# Patient Record
Sex: FEMALE | Race: BLACK OR AFRICAN AMERICAN | NOT HISPANIC OR LATINO
[De-identification: names, ages, dates, MRNs, and addresses within clinical notes are randomized per-mention and may not be internally consistent; named-entity substitution may affect disease eponyms.]

---

## 2018-11-08 PROBLEM — Z00.00 ENCOUNTER FOR PREVENTIVE HEALTH EXAMINATION: Status: ACTIVE | Noted: 2018-11-08

## 2018-11-13 ENCOUNTER — APPOINTMENT (OUTPATIENT)
Dept: VASCULAR SURGERY | Facility: CLINIC | Age: 68
End: 2018-11-13
Payer: COMMERCIAL

## 2018-11-13 ENCOUNTER — INPATIENT (INPATIENT)
Facility: HOSPITAL | Age: 68
LOS: 30 days | Discharge: NURSING, REHAB OR LONG-TERM CARE | End: 2018-12-14
Attending: INTERNAL MEDICINE | Admitting: INTERNAL MEDICINE
Payer: MEDICARE

## 2018-11-13 VITALS
DIASTOLIC BLOOD PRESSURE: 73 MMHG | HEART RATE: 127 BPM | SYSTOLIC BLOOD PRESSURE: 137 MMHG | RESPIRATION RATE: 16 BRPM | TEMPERATURE: 98 F | OXYGEN SATURATION: 100 %

## 2018-11-13 VITALS
WEIGHT: 170 LBS | BODY MASS INDEX: 26.68 KG/M2 | HEIGHT: 67 IN | HEART RATE: 114 BPM | SYSTOLIC BLOOD PRESSURE: 132 MMHG | DIASTOLIC BLOOD PRESSURE: 83 MMHG

## 2018-11-13 LAB
ALBUMIN SERPL ELPH-MCNC: 3.7 G/DL — SIGNIFICANT CHANGE UP (ref 3.3–5)
ALP SERPL-CCNC: 85 U/L — SIGNIFICANT CHANGE UP (ref 40–120)
ALT FLD-CCNC: 12 U/L — SIGNIFICANT CHANGE UP (ref 4–33)
APTT BLD: 32 SEC — SIGNIFICANT CHANGE UP (ref 27.5–36.3)
AST SERPL-CCNC: 13 U/L — SIGNIFICANT CHANGE UP (ref 4–32)
BASOPHILS # BLD AUTO: 0.09 K/UL — SIGNIFICANT CHANGE UP (ref 0–0.2)
BASOPHILS NFR BLD AUTO: 0.4 % — SIGNIFICANT CHANGE UP (ref 0–2)
BILIRUB SERPL-MCNC: 0.2 MG/DL — SIGNIFICANT CHANGE UP (ref 0.2–1.2)
BUN SERPL-MCNC: 23 MG/DL — SIGNIFICANT CHANGE UP (ref 7–23)
CALCIUM SERPL-MCNC: 10.9 MG/DL — HIGH (ref 8.4–10.5)
CHLORIDE SERPL-SCNC: 100 MMOL/L — SIGNIFICANT CHANGE UP (ref 98–107)
CO2 SERPL-SCNC: 28 MMOL/L — SIGNIFICANT CHANGE UP (ref 22–31)
CREAT SERPL-MCNC: 0.83 MG/DL — SIGNIFICANT CHANGE UP (ref 0.5–1.3)
EOSINOPHIL # BLD AUTO: 0.04 K/UL — SIGNIFICANT CHANGE UP (ref 0–0.5)
EOSINOPHIL NFR BLD AUTO: 0.2 % — SIGNIFICANT CHANGE UP (ref 0–6)
GLUCOSE SERPL-MCNC: 150 MG/DL — HIGH (ref 70–99)
HCT VFR BLD CALC: 33.9 % — LOW (ref 34.5–45)
HGB BLD-MCNC: 10.6 G/DL — LOW (ref 11.5–15.5)
IMM GRANULOCYTES # BLD AUTO: 0.15 # — SIGNIFICANT CHANGE UP
IMM GRANULOCYTES NFR BLD AUTO: 0.7 % — SIGNIFICANT CHANGE UP (ref 0–1.5)
INR BLD: 1.13 — SIGNIFICANT CHANGE UP (ref 0.88–1.17)
LYMPHOCYTES # BLD AUTO: 10.5 % — LOW (ref 13–44)
LYMPHOCYTES # BLD AUTO: 2.24 K/UL — SIGNIFICANT CHANGE UP (ref 1–3.3)
MCHC RBC-ENTMCNC: 24.2 PG — LOW (ref 27–34)
MCHC RBC-ENTMCNC: 31.3 % — LOW (ref 32–36)
MCV RBC AUTO: 77.4 FL — LOW (ref 80–100)
MONOCYTES # BLD AUTO: 1.96 K/UL — HIGH (ref 0–0.9)
MONOCYTES NFR BLD AUTO: 9.2 % — SIGNIFICANT CHANGE UP (ref 2–14)
NEUTROPHILS # BLD AUTO: 16.94 K/UL — HIGH (ref 1.8–7.4)
NEUTROPHILS NFR BLD AUTO: 79 % — HIGH (ref 43–77)
NRBC # FLD: 0 — SIGNIFICANT CHANGE UP
PLATELET # BLD AUTO: 454 K/UL — HIGH (ref 150–400)
PMV BLD: 10 FL — SIGNIFICANT CHANGE UP (ref 7–13)
POTASSIUM SERPL-MCNC: 3.8 MMOL/L — SIGNIFICANT CHANGE UP (ref 3.5–5.3)
POTASSIUM SERPL-SCNC: 3.8 MMOL/L — SIGNIFICANT CHANGE UP (ref 3.5–5.3)
PROT SERPL-MCNC: 7.9 G/DL — SIGNIFICANT CHANGE UP (ref 6–8.3)
PROTHROM AB SERPL-ACNC: 12.9 SEC — SIGNIFICANT CHANGE UP (ref 9.8–13.1)
RBC # BLD: 4.38 M/UL — SIGNIFICANT CHANGE UP (ref 3.8–5.2)
RBC # FLD: 17.6 % — HIGH (ref 10.3–14.5)
SODIUM SERPL-SCNC: 140 MMOL/L — SIGNIFICANT CHANGE UP (ref 135–145)
WBC # BLD: 21.42 K/UL — HIGH (ref 3.8–10.5)
WBC # FLD AUTO: 21.42 K/UL — HIGH (ref 3.8–10.5)

## 2018-11-13 PROCEDURE — 93923 UPR/LXTR ART STDY 3+ LVLS: CPT

## 2018-11-13 PROCEDURE — 99203 OFFICE O/P NEW LOW 30 MIN: CPT

## 2018-11-13 PROCEDURE — 93925 LOWER EXTREMITY STUDY: CPT

## 2018-11-13 RX ORDER — CEFAZOLIN SODIUM 1 G
1000 VIAL (EA) INJECTION ONCE
Qty: 0 | Refills: 0 | Status: COMPLETED | OUTPATIENT
Start: 2018-11-13 | End: 2018-11-13

## 2018-11-13 RX ORDER — SODIUM CHLORIDE 9 MG/ML
1000 INJECTION INTRAMUSCULAR; INTRAVENOUS; SUBCUTANEOUS ONCE
Qty: 0 | Refills: 0 | Status: COMPLETED | OUTPATIENT
Start: 2018-11-13 | End: 2018-11-13

## 2018-11-13 RX ORDER — ACETAMINOPHEN 500 MG
650 TABLET ORAL ONCE
Qty: 0 | Refills: 0 | Status: COMPLETED | OUTPATIENT
Start: 2018-11-13 | End: 2018-11-13

## 2018-11-13 RX ADMIN — Medication 650 MILLIGRAM(S): at 22:43

## 2018-11-13 RX ADMIN — Medication 100 MILLIGRAM(S): at 22:19

## 2018-11-13 RX ADMIN — SODIUM CHLORIDE 1000 MILLILITER(S): 9 INJECTION INTRAMUSCULAR; INTRAVENOUS; SUBCUTANEOUS at 22:43

## 2018-11-13 NOTE — ED PROVIDER NOTE - MEDICAL DECISION MAKING DETAILS
68F DM HTN sent in by Dr Hamilton for eval of left foot with swelling, erythema. Concern for cellulitis vs worsening arterial occlusion. Surgery consulted. Labs. Xrays. Rectal temp. Abx.

## 2018-11-13 NOTE — ED PROVIDER NOTE - OBJECTIVE STATEMENT
67yo female pmh HTN, DM, breast CA s/p chemo, lumpectomy p/w left leg pain sent in by Dr Hamilton. LLE pain x 1 month, worsening. Bx nodule right calf by dermatologist, told it was "vascular" and was sent to vascular surgeon. 67yo female pmh HTN, DM, breast CA s/p chemo, lumpectomy p/w left leg pain sent in by Dr Hamilton. LLE pain x 1 month, worsening. Bx nodule right calf by dermatologist, told it was "vascular" and was sent to vascular surgeon. Pt c/o pain in left foot worse with palpation, no improvement with Tylenol. Denies fevers, vomiting, diarrhea, abdominal pain, recent travel, urinary symptoms, chest pain, dyspnea.

## 2018-11-13 NOTE — ED ADULT NURSE NOTE - OBJECTIVE STATEMENT
Pt received A&Ox4 to ED Rm 1 with c/o LLE swelling x 1 mo. Seen by PMD & told to report to ED for further work up. LLE noted to be cold with +2 pitting edema & slight redness, no pedal pulse felt; MD at bedside with doppler. Affected extremity//painful to touch. States, "When I walk sometimes my leg feels sluggish." Pt denies SOB, CP, palp, or any other adverse symptom at this time. States hx of breast ca with R sided lumpectomy; precautions noted to RUE. RR equal & unlabored. Awaiting MD orders. Report to primary RN.

## 2018-11-13 NOTE — ED PROVIDER NOTE - ATTENDING CONTRIBUTION TO CARE
DR. BLOCH, ATTENDING MD-  I performed a face to face bedside interview with patient regarding history of present illness, review of symptoms and past medical history. I completed an independent physical exam.  I have discussed patient's plan of care with the resident.   Patient well appearing, febrile, tachy to 120, HEENT nml lungs clear, heart sounds nml abd soft extrem mild edema no palp pulses bilaterally but warm extrem 3 sec cap refill and dopplerable pt but not DP bilaterally. sensation intact. tender dorsum left foot with red streaks upmedial lower leg

## 2018-11-13 NOTE — ED PROVIDER NOTE - PHYSICAL EXAMINATION
Pulses: Unable to palpate. Doppler PT pulses bilateral ankles, left diminished compared to right. Unable to doppler bilateral DP pulses

## 2018-11-13 NOTE — ED PROVIDER NOTE - PROGRESS NOTE DETAILS
Rectal temp elevated. Will give tylenol, IVF. Already received cefazolin. Surgery aware. Zvi Dubin, MD ([id number]) note: accepted by Dr Lema.  Per request, added BCx, CTA aorta w/run off, broad ABX.

## 2018-11-13 NOTE — ED ADULT TRIAGE NOTE - CHIEF COMPLAINT QUOTE
Pt sent in by PMD to r/o Lt lower leg ischemia, pt states she has poor circulation to Lt leg c/o redness, warmth, and swelling to Lt leg, denies sob, breathing even and unlabored, denies cp/discomfort. Pt sent in for admission.

## 2018-11-14 DIAGNOSIS — L03.116 CELLULITIS OF LEFT LOWER LIMB: ICD-10-CM

## 2018-11-14 DIAGNOSIS — Z29.9 ENCOUNTER FOR PROPHYLACTIC MEASURES, UNSPECIFIED: ICD-10-CM

## 2018-11-14 DIAGNOSIS — D50.9 IRON DEFICIENCY ANEMIA, UNSPECIFIED: ICD-10-CM

## 2018-11-14 DIAGNOSIS — E11.9 TYPE 2 DIABETES MELLITUS WITHOUT COMPLICATIONS: ICD-10-CM

## 2018-11-14 DIAGNOSIS — I10 ESSENTIAL (PRIMARY) HYPERTENSION: ICD-10-CM

## 2018-11-14 LAB
APPEARANCE UR: SIGNIFICANT CHANGE UP
BACTERIA # UR AUTO: NEGATIVE — SIGNIFICANT CHANGE UP
BASE EXCESS BLDV CALC-SCNC: 4.1 MMOL/L — SIGNIFICANT CHANGE UP
BILIRUB UR-MCNC: NEGATIVE — SIGNIFICANT CHANGE UP
BLOOD GAS VENOUS - CREATININE: 0.64 MG/DL — SIGNIFICANT CHANGE UP (ref 0.5–1.3)
BLOOD UR QL VISUAL: SIGNIFICANT CHANGE UP
CHLORIDE BLDV-SCNC: 106 MMOL/L — SIGNIFICANT CHANGE UP (ref 96–108)
COLOR SPEC: YELLOW — SIGNIFICANT CHANGE UP
GAS PNL BLDV: 131 MMOL/L — LOW (ref 136–146)
GLUCOSE BLDV-MCNC: 146 — HIGH (ref 70–99)
GLUCOSE UR-MCNC: NEGATIVE — SIGNIFICANT CHANGE UP
HBA1C BLD-MCNC: 6.3 % — HIGH (ref 4–5.6)
HCO3 BLDV-SCNC: 27 MMOL/L — SIGNIFICANT CHANGE UP (ref 20–27)
HCT VFR BLDV CALC: 31.2 % — LOW (ref 34.5–45)
HGB BLDV-MCNC: 10.1 G/DL — LOW (ref 11.5–15.5)
HYALINE CASTS # UR AUTO: HIGH
KETONES UR-MCNC: SIGNIFICANT CHANGE UP
LACTATE BLDV-MCNC: 1.5 MMOL/L — SIGNIFICANT CHANGE UP (ref 0.5–2)
LEUKOCYTE ESTERASE UR-ACNC: SIGNIFICANT CHANGE UP
NITRITE UR-MCNC: NEGATIVE — SIGNIFICANT CHANGE UP
PCO2 BLDV: 50 MMHG — SIGNIFICANT CHANGE UP (ref 41–51)
PH BLDV: 7.38 PH — SIGNIFICANT CHANGE UP (ref 7.32–7.43)
PH UR: 5.5 — SIGNIFICANT CHANGE UP (ref 5–8)
PO2 BLDV: 29 MMHG — LOW (ref 35–40)
POTASSIUM BLDV-SCNC: 3.6 MMOL/L — SIGNIFICANT CHANGE UP (ref 3.4–4.5)
PROT UR-MCNC: 100 — HIGH
RBC CASTS # UR COMP ASSIST: SIGNIFICANT CHANGE UP (ref 0–?)
SAO2 % BLDV: 41.7 % — LOW (ref 60–85)
SP GR SPEC: 1.03 — SIGNIFICANT CHANGE UP (ref 1–1.04)
SQUAMOUS # UR AUTO: SIGNIFICANT CHANGE UP
UROBILINOGEN FLD QL: SIGNIFICANT CHANGE UP
WBC UR QL: HIGH (ref 0–?)

## 2018-11-14 PROCEDURE — 73600 X-RAY EXAM OF ANKLE: CPT | Mod: 26,LT

## 2018-11-14 PROCEDURE — 99232 SBSQ HOSP IP/OBS MODERATE 35: CPT | Mod: GC

## 2018-11-14 PROCEDURE — 99222 1ST HOSP IP/OBS MODERATE 55: CPT

## 2018-11-14 PROCEDURE — 73620 X-RAY EXAM OF FOOT: CPT | Mod: 26,LT

## 2018-11-14 PROCEDURE — 99233 SBSQ HOSP IP/OBS HIGH 50: CPT | Mod: GC

## 2018-11-14 PROCEDURE — 71046 X-RAY EXAM CHEST 2 VIEWS: CPT | Mod: 26

## 2018-11-14 PROCEDURE — 75635 CT ANGIO ABDOMINAL ARTERIES: CPT | Mod: 26

## 2018-11-14 PROCEDURE — 73590 X-RAY EXAM OF LOWER LEG: CPT | Mod: 26,LT

## 2018-11-14 RX ORDER — DEXTROSE 50 % IN WATER 50 %
15 SYRINGE (ML) INTRAVENOUS ONCE
Qty: 0 | Refills: 0 | Status: DISCONTINUED | OUTPATIENT
Start: 2018-11-14 | End: 2018-12-14

## 2018-11-14 RX ORDER — PIPERACILLIN AND TAZOBACTAM 4; .5 G/20ML; G/20ML
3.38 INJECTION, POWDER, LYOPHILIZED, FOR SOLUTION INTRAVENOUS EVERY 12 HOURS
Qty: 0 | Refills: 0 | Status: DISCONTINUED | OUTPATIENT
Start: 2018-11-14 | End: 2018-11-14

## 2018-11-14 RX ORDER — VANCOMYCIN HCL 1 G
1000 VIAL (EA) INTRAVENOUS EVERY 12 HOURS
Qty: 0 | Refills: 0 | Status: DISCONTINUED | OUTPATIENT
Start: 2018-11-14 | End: 2018-11-16

## 2018-11-14 RX ORDER — ENOXAPARIN SODIUM 100 MG/ML
40 INJECTION SUBCUTANEOUS DAILY
Qty: 0 | Refills: 0 | Status: DISCONTINUED | OUTPATIENT
Start: 2018-11-14 | End: 2018-11-14

## 2018-11-14 RX ORDER — ACETAMINOPHEN 500 MG
975 TABLET ORAL EVERY 6 HOURS
Qty: 0 | Refills: 0 | Status: DISCONTINUED | OUTPATIENT
Start: 2018-11-14 | End: 2018-12-14

## 2018-11-14 RX ORDER — PIPERACILLIN AND TAZOBACTAM 4; .5 G/20ML; G/20ML
3.38 INJECTION, POWDER, LYOPHILIZED, FOR SOLUTION INTRAVENOUS EVERY 8 HOURS
Qty: 0 | Refills: 0 | Status: DISCONTINUED | OUTPATIENT
Start: 2018-11-14 | End: 2018-11-14

## 2018-11-14 RX ORDER — PIPERACILLIN AND TAZOBACTAM 4; .5 G/20ML; G/20ML
3.38 INJECTION, POWDER, LYOPHILIZED, FOR SOLUTION INTRAVENOUS ONCE
Qty: 0 | Refills: 0 | Status: COMPLETED | OUTPATIENT
Start: 2018-11-14 | End: 2018-11-14

## 2018-11-14 RX ORDER — OXYCODONE HYDROCHLORIDE 5 MG/1
5 TABLET ORAL EVERY 6 HOURS
Qty: 0 | Refills: 0 | Status: DISCONTINUED | OUTPATIENT
Start: 2018-11-14 | End: 2018-11-19

## 2018-11-14 RX ORDER — VANCOMYCIN HCL 1 G
1000 VIAL (EA) INTRAVENOUS ONCE
Qty: 0 | Refills: 0 | Status: COMPLETED | OUTPATIENT
Start: 2018-11-14 | End: 2018-11-14

## 2018-11-14 RX ORDER — SODIUM CHLORIDE 9 MG/ML
1000 INJECTION, SOLUTION INTRAVENOUS
Qty: 0 | Refills: 0 | Status: DISCONTINUED | OUTPATIENT
Start: 2018-11-14 | End: 2018-12-14

## 2018-11-14 RX ORDER — LISINOPRIL 2.5 MG/1
10 TABLET ORAL DAILY
Qty: 0 | Refills: 0 | Status: DISCONTINUED | OUTPATIENT
Start: 2018-11-14 | End: 2018-11-14

## 2018-11-14 RX ORDER — DEXTROSE 50 % IN WATER 50 %
12.5 SYRINGE (ML) INTRAVENOUS ONCE
Qty: 0 | Refills: 0 | Status: DISCONTINUED | OUTPATIENT
Start: 2018-11-14 | End: 2018-12-14

## 2018-11-14 RX ORDER — SODIUM CHLORIDE 9 MG/ML
1000 INJECTION, SOLUTION INTRAVENOUS
Qty: 0 | Refills: 0 | Status: DISCONTINUED | OUTPATIENT
Start: 2018-11-14 | End: 2018-11-25

## 2018-11-14 RX ORDER — POTASSIUM CHLORIDE 20 MEQ
10 PACKET (EA) ORAL
Qty: 0 | Refills: 0 | Status: COMPLETED | OUTPATIENT
Start: 2018-11-14 | End: 2018-11-14

## 2018-11-14 RX ORDER — HEPARIN SODIUM 5000 [USP'U]/ML
900 INJECTION INTRAVENOUS; SUBCUTANEOUS
Qty: 25000 | Refills: 0 | Status: DISCONTINUED | OUTPATIENT
Start: 2018-11-14 | End: 2018-11-14

## 2018-11-14 RX ORDER — GLUCAGON INJECTION, SOLUTION 0.5 MG/.1ML
1 INJECTION, SOLUTION SUBCUTANEOUS ONCE
Qty: 0 | Refills: 0 | Status: DISCONTINUED | OUTPATIENT
Start: 2018-11-14 | End: 2018-12-14

## 2018-11-14 RX ORDER — METFORMIN HYDROCHLORIDE 850 MG/1
1 TABLET ORAL
Qty: 0 | Refills: 0 | COMMUNITY

## 2018-11-14 RX ORDER — HEPARIN SODIUM 5000 [USP'U]/ML
1200 INJECTION INTRAVENOUS; SUBCUTANEOUS
Qty: 25000 | Refills: 0 | Status: DISCONTINUED | OUTPATIENT
Start: 2018-11-14 | End: 2018-11-15

## 2018-11-14 RX ORDER — INSULIN LISPRO 100/ML
VIAL (ML) SUBCUTANEOUS
Qty: 0 | Refills: 0 | Status: DISCONTINUED | OUTPATIENT
Start: 2018-11-14 | End: 2018-12-14

## 2018-11-14 RX ORDER — LISINOPRIL 2.5 MG/1
10 TABLET ORAL DAILY
Qty: 0 | Refills: 0 | Status: DISCONTINUED | OUTPATIENT
Start: 2018-11-14 | End: 2018-12-14

## 2018-11-14 RX ADMIN — SODIUM CHLORIDE 75 MILLILITER(S): 9 INJECTION, SOLUTION INTRAVENOUS at 02:24

## 2018-11-14 RX ADMIN — OXYCODONE HYDROCHLORIDE 5 MILLIGRAM(S): 5 TABLET ORAL at 18:15

## 2018-11-14 RX ADMIN — HEPARIN SODIUM 12 UNIT(S)/HR: 5000 INJECTION INTRAVENOUS; SUBCUTANEOUS at 17:21

## 2018-11-14 RX ADMIN — Medication 100 MILLIEQUIVALENT(S): at 10:57

## 2018-11-14 RX ADMIN — LISINOPRIL 10 MILLIGRAM(S): 2.5 TABLET ORAL at 05:56

## 2018-11-14 RX ADMIN — Medication 250 MILLIGRAM(S): at 12:42

## 2018-11-14 RX ADMIN — Medication 100 MILLIEQUIVALENT(S): at 12:28

## 2018-11-14 RX ADMIN — Medication 1: at 11:52

## 2018-11-14 RX ADMIN — Medication 250 MILLIGRAM(S): at 02:39

## 2018-11-14 RX ADMIN — Medication 650 MILLIGRAM(S): at 01:58

## 2018-11-14 RX ADMIN — PIPERACILLIN AND TAZOBACTAM 200 GRAM(S): 4; .5 INJECTION, POWDER, LYOPHILIZED, FOR SOLUTION INTRAVENOUS at 01:59

## 2018-11-14 RX ADMIN — OXYCODONE HYDROCHLORIDE 5 MILLIGRAM(S): 5 TABLET ORAL at 18:45

## 2018-11-14 NOTE — CONSULT NOTE ADULT - ASSESSMENT
67 yo woman with a hx of DM, HTN and breast cancer s/p lumpectomy and chemoradiation admitted for foot pain found to have left adrenal incidentaloma 3 x 2.8cm without associated symptoms.    Plan:   Recommend urine metanephrine catecholamine and VMA   Recommend MR abdomen to evaluate for the adrenal lesion given it was indeterminant on the CTA and CT adrenal study is multiphase and would require contrast   Discussed with Dr. Rebolledo and will follow

## 2018-11-14 NOTE — PROGRESS NOTE ADULT - SUBJECTIVE AND OBJECTIVE BOX
General Surgery Progress Note    SUBJECTIVE:  The patient was seen and examined. No acute events overnight.   c/o pain in LLE    OBJECTIVE:     ** VITAL SIGNS / I&O's **    Vital Signs Last 24 Hrs  T(C): 36.8 (2018 05:40), Max: 38.5 (2018 22:22)  T(F): 98.3 (2018 05:40), Max: 101.3 (2018 22:22)  HR: 95 (2018 05:40) (95 - 127)  BP: 133/92 (2018 05:40) (133/92 - 145/87)  BP(mean): --  RR: 16 (2018 05:40) (16 - 16)  SpO2: 100% (2018 05:40) (100% - 100%)        ** PHYSICAL EXAM **    -- CONSTITUTIONAL: Alert, NAD  -- PULMONARY: non-labored respirations  -- EXTR: LLE cool compared to right, no ulcers or wounds; left foot edematous and TTP to palpation, no cyanosis. +fem pulses b/l. +PT signals b/l.    ** LABS **                          10.6   21.42 )-----------( 454      ( 2018 21:47 )             33.9     2018 21:47    140    |  100    |  23     ----------------------------<  150    3.8     |  28     |  0.83     Ca    10.9       2018 21:47    TPro  7.9    /  Alb  3.7    /  TBili  0.2    /  DBili  x      /  AST  13     /  ALT  12     /  AlkPhos  85     2018 21:47    PT/INR - ( 2018 22:25 )   PT: 12.9 SEC;   INR: 1.13          PTT - ( 2018 22:25 )  PTT:32.0 SEC  CAPILLARY BLOOD GLUCOSE      POCT Blood Glucose.: 108 mg/dL (2018 09:45)        LIVER FUNCTIONS - ( 2018 21:47 )  Alb: 3.7 g/dL / Pro: 7.9 g/dL / ALK PHOS: 85 u/L / ALT: 12 u/L / AST: 13 u/L / GGT: x             Urinalysis Basic - ( 2018 00:30 )    Color: YELLOW / Appearance: Lt TURBID / S.031 / pH: 5.5  Gluc: NEGATIVE / Ketone: TRACE  / Bili: NEGATIVE / Urobili: TRACE   Blood: SMALL / Protein: 100 / Nitrite: NEGATIVE   Leuk Esterase: MODERATE / RBC: 3-5 / WBC 26-50   Sq Epi: MODERATE / Non Sq Epi: x / Bacteria: NEGATIVE        MEDICATIONS  (STANDING):  dextrose 5% + sodium chloride 0.45%. 1000 milliLiter(s) (75 mL/Hr) IV Continuous <Continuous>  dextrose 5%. 1000 milliLiter(s) (50 mL/Hr) IV Continuous <Continuous>  dextrose 50% Injectable 12.5 Gram(s) IV Push once  enoxaparin Injectable 40 milliGRAM(s) SubCutaneous daily  insulin lispro (HumaLOG) corrective regimen sliding scale   SubCutaneous three times a day before meals  lisinopril 10 milliGRAM(s) Oral daily  piperacillin/tazobactam IVPB. 3.375 Gram(s) IV Intermittent every 12 hours  potassium chloride  10 mEq/100 mL IVPB 10 milliEquivalent(s) IV Intermittent every 1 hour  vancomycin  IVPB 1000 milliGRAM(s) IV Intermittent every 12 hours    MEDICATIONS  (PRN):  acetaminophen   Tablet .. 975 milliGRAM(s) Oral every 6 hours PRN Mild Pain (1 - 3)  dextrose 40% Gel 15 Gram(s) Oral once PRN Blood Glucose LESS THAN 70 milliGRAM(s)/deciliter  glucagon  Injectable 1 milliGRAM(s) IntraMuscular once PRN Glucose LESS THAN 70 milligrams/deciliter  oxyCODONE    IR 5 milliGRAM(s) Oral every 6 hours PRN Moderate Pain (4 - 6)

## 2018-11-14 NOTE — CONSULT NOTE ADULT - ASSESSMENT
67 y/o female with PMH DM, HTN, beast CA. Sent in by Dr. Lema concerning left LE cellulitis. Patient was septic in ED, febrile 101.3, tachy 120 in ED.     afebrile now, leukocytosis 21.4  LLE xray: negative OM  UCx and BCx pending   UA: positive hyaline cast, protein, WBC    Plan:  - pt was seen and evaluated   - left LE cellulitis with left foot cold and pain to touch  - no open wound or fluctuance to the left foot  - foot is not likely the source of infection  - no podiatry intervention is planned   - podiatry will sign off, please reconsult as needed   - d/w attending

## 2018-11-14 NOTE — CONSULT NOTE ADULT - PROBLEM SELECTOR RECOMMENDATION 9
- Pt meeting sepsis criteria on admission; sepsis now resolved  - C/w vancomycin  - f/u blood cultures, if negative, downgrade antibiotics to Keflex  - Appreciate podiatry consult  - tylenol PRN for fever - Pt meeting sepsis criteria on admission; sepsis now resolved  - C/w vancomycin. Can d/c zosyn   - f/u blood cultures, if negative, downgrade antibiotics to Keflex  - Appreciate podiatry consult  - tylenol PRN for fever  - Will transfer to medicine service TOMORROW (11/15/18)

## 2018-11-14 NOTE — H&P ADULT - ATTENDING COMMENTS
Seen ex'ed in ER in am  DW"ed team,    Pt seen in oofice yest and sent for admission    One month L foot pain/discoloration/coolness    Unable to WB  DM, Smoker    Tachycardic  febrile  WBC elev    LEs:   Livedo retic  L foot: cyanotic, delayed cap refill, cool.  Mildly tender  No fluctuance/tenderness  Mild erythema medial ankle  No ulcers  Palp equal fem, pop, PT pulses    LASHAUN/PVR's, art US in office yest: GHood perf to level of ankle.  Poor pedal flow.  DErmatopath report: R calf- arteritis obliterans.  Poss antiphospholipid.    CTA reviewed:   Patent vessels to ankles.  Incidental finsings incl Adrtenal nodule    A/P:   L foot ischemia.  Atypical presentaiton.  Good perf to ankle.  No immediate plans for vasc intervention    Admit  AC  foot care/protection  Will need multispecialty teague  Heme R/O hypercoag  Rheum R/O vasculidities  Cardio for tachy  ID if infection present  Surg onc for adrenal nodule  Podiatry for foot

## 2018-11-14 NOTE — CONSULT NOTE ADULT - SUBJECTIVE AND OBJECTIVE BOX
Maribel Tong MD   PGY 3   [id number]     CHIEF COMPLAINT: L foot pain and tenderness    HPI:  67 yo F w pmhx of T2DM, HTN presenting with complaint of L foot pain, swelling and tenderness for 1 month. Patient was seen by Dr. Lema (vascular) in clinic and referred to the hospital for concern of ischemic limb. Patient reports subjective fevers and chills at home. She denies any drainage or ulcerations in the foot. She is ambulatory with cane at home though debilitated by the current foot situation. Patient is refusing to provide detailed history of her current foot problem.     In ED, patient febrile to 101.3F, , /87 RR 16 100% on RA.   In hospital patient was treated with vancomycin + zosyn. CTA LE demonstrated patent iliac, femoral and popliteal arteries. Patient reports improvement over the past day. Medicine was consulted for concern of cellulitis.       PAST MEDICAL & SURGICAL HISTORY:  DM (diabetes mellitus)  HTN (hypertension)  Breast CA  No significant past surgical history      FAMILY HISTORY:  No pertinent family history in first degree relatives    Allergies  No Known Allergies    HOME MEDICATIONS:  Metformin   Lisinopril     HOSPITAL MEDICATIONS:  MEDICATIONS  (STANDING):  dextrose 5% + sodium chloride 0.45%. 1000 milliLiter(s) (75 mL/Hr) IV Continuous <Continuous>  dextrose 5%. 1000 milliLiter(s) (50 mL/Hr) IV Continuous <Continuous>  dextrose 50% Injectable 12.5 Gram(s) IV Push once  heparin  Infusion 1200 Unit(s)/Hr (12 mL/Hr) IV Continuous <Continuous>  insulin lispro (HumaLOG) corrective regimen sliding scale   SubCutaneous three times a day before meals  lisinopril 10 milliGRAM(s) Oral daily  piperacillin/tazobactam IVPB. 3.375 Gram(s) IV Intermittent every 8 hours  vancomycin  IVPB 1000 milliGRAM(s) IV Intermittent every 12 hours    MEDICATIONS  (PRN):  acetaminophen   Tablet .. 975 milliGRAM(s) Oral every 6 hours PRN Mild Pain (1 - 3)  dextrose 40% Gel 15 Gram(s) Oral once PRN Blood Glucose LESS THAN 70 milliGRAM(s)/deciliter  glucagon  Injectable 1 milliGRAM(s) IntraMuscular once PRN Glucose LESS THAN 70 milligrams/deciliter  oxyCODONE    IR 5 milliGRAM(s) Oral every 6 hours PRN Moderate Pain (4 - 6)      General: + fevers, + chills  HEENT: No headaches, dysphagia, changes in vision, hoarseness, nasal congestion  CVS: No chest pain, palpitations   Resp: No shortness of breath, wheezing   GI: No abdominal pain, nausea, vomiting, changes in bowel habits  Renal: No dysuria, hematuria   Ext: + LLE edema, + LLE claudication   Skin: No rashes or itching   Neuro: No confusion, weakness  Endcorine: No excessive heat or cold symptoms      OBJECTIVE:  Vital Signs Last 24 Hrs  T(C): 37.2 (2018 12:57), Max: 38.5 (2018 22:22)  T(F): 99 (2018 12:57), Max: 101.3 (2018 22:22)  HR: 97 (2018 12:57) (95 - 127)  BP: 130/77 (2018 12:57) (107/83 - 145/87)  RR: 17 (2018 12:57) (14 - 17)  SpO2: 99% (2018 12:57) (99% - 100%)    POCT Blood Glucose.: 160 mg/dL (2018 11:44)    General: NAD  HEAD: Atraumatic, Normocephalic  ENT: EOMI, PERRL, conjunctiva and sclera clear, neck supple, no JVD, no erythema or exudates in the oropharynx  Chest: lungs clear to auscultation bilaterally, no crackles, wheezing, rhonchi   Heart: Nl S1 S2, NSR, no murmurs, gallops  Abd: Nl BS, soft, NT, ND   Ext: 2+ peripheral pulse in RLE, LLE DP pulse not palpable. left foot grossly swollen, tender to light palpation, bluish discoloration, cool,   Neuro: AOx3 no focal deficits  Skin: Warm, dry, intact  Psych: Affect nl       LABS:  CBC Full  -  (  @ 21:47 )                        10.6 g/dL  33.9 % )-----------( 31.3 %              24.2 pg  Mean Cell Volume : 77.4 fL  Auto Neutrophil # : 17.6 %  Auto Lymphocyte # : 21.42 K/uL  Auto Monocyte # : x  Auto Eosinophil # : x  Auto Basophil # : x        140  |  100  |  23  ----------------------------<  150<H>  3.8   |  28  |  0.83    Ca    10.9<H>      2018 21:47    TPro  7.9  /  Alb  3.7  /  TBili  0.2  /  DBili  x   /  AST  13  /  ALT  12  /  AlkPhos  85  11-13    PT/INR - ( 2018 22:25 )   PT: 12.9 SEC;   INR: 1.13          PTT - ( 2018 22:25 )  PTT:32.0 SEC  Urinalysis Basic - ( 2018 00:30 )    Color: YELLOW / Appearance: Lt TURBID / S.031 / pH: 5.5  Gluc: NEGATIVE / Ketone: TRACE  / Bili: NEGATIVE / Urobili: TRACE   Blood: SMALL / Protein: 100 / Nitrite: NEGATIVE   Leuk Esterase: MODERATE / RBC: 3-5 / WBC 26-50   Sq Epi: MODERATE / Non Sq Epi: x / Bacteria: NEGATIVE      VB.38/50/29/27  Lactate = 1.5      < from: CT Angio Abd Aorta w/run-off w/ IV Cont (18 @ 09:03) >    IMPRESSION:     Normal caliber and patent abdominal aorta.    Three vessel run off to both lower extremities. Left lower extremity   edema without subcutaneous emphysema.    Indeterminate left adrenal gland lesion measuring 3.0 cm. This can be   further evaluated with dedicated MRI adrenal gland protocol.    < end of copied text > Maribel Tong MD   PGY 3   [id number]     CHIEF COMPLAINT: L foot pain and tenderness    HPI:  67 yo F w pmhx of T2DM, HTN presenting with complaint of L foot pain, swelling and tenderness for 1 month. Patient was seen by Dr. Lema (vascular) in clinic and referred to the hospital for concern of ischemic limb. Patient reports subjective fevers and chills at home. She denies any drainage or ulcerations in the foot. She is ambulatory with cane at home though debilitated by the current foot situation. Patient is refusing to provide detailed history of her current foot problem.     In ED, patient febrile to 101.3F, , /87 RR 16 100% on RA.   In hospital patient was treated with vancomycin + zosyn. CTA LE demonstrated patent iliac, femoral and popliteal arteries. Patient reports improvement over the past day. Medicine was consulted for concern of cellulitis.       PAST MEDICAL & SURGICAL HISTORY:  DM (diabetes mellitus)  HTN (hypertension)  Breast CA  No significant past surgical history      FAMILY HISTORY:  No pertinent family history in first degree relatives    Allergies  No Known Allergies    HOME MEDICATIONS:  Metformin   Lisinopril     HOSPITAL MEDICATIONS:  MEDICATIONS  (STANDING):  dextrose 5% + sodium chloride 0.45%. 1000 milliLiter(s) (75 mL/Hr) IV Continuous <Continuous>  dextrose 5%. 1000 milliLiter(s) (50 mL/Hr) IV Continuous <Continuous>  dextrose 50% Injectable 12.5 Gram(s) IV Push once  heparin  Infusion 1200 Unit(s)/Hr (12 mL/Hr) IV Continuous <Continuous>  insulin lispro (HumaLOG) corrective regimen sliding scale   SubCutaneous three times a day before meals  lisinopril 10 milliGRAM(s) Oral daily  piperacillin/tazobactam IVPB. 3.375 Gram(s) IV Intermittent every 8 hours  vancomycin  IVPB 1000 milliGRAM(s) IV Intermittent every 12 hours    MEDICATIONS  (PRN):  acetaminophen   Tablet .. 975 milliGRAM(s) Oral every 6 hours PRN Mild Pain (1 - 3)  dextrose 40% Gel 15 Gram(s) Oral once PRN Blood Glucose LESS THAN 70 milliGRAM(s)/deciliter  glucagon  Injectable 1 milliGRAM(s) IntraMuscular once PRN Glucose LESS THAN 70 milligrams/deciliter  oxyCODONE    IR 5 milliGRAM(s) Oral every 6 hours PRN Moderate Pain (4 - 6)      General: + fevers, + chills  HEENT: No headaches, dysphagia, changes in vision, hoarseness, nasal congestion  CVS: No chest pain, palpitations   Resp: No shortness of breath, wheezing   GI: No abdominal pain, nausea, vomiting, changes in bowel habits  Renal: No dysuria, hematuria   Ext: + LLE edema, + LLE claudication   Skin: No rashes or itching   Neuro: No confusion, weakness  Endcorine: No excessive heat or cold symptoms  All other 14 poitn ROS negative except for that above.     OBJECTIVE:  Vital Signs Last 24 Hrs  T(C): 37.2 (2018 12:57), Max: 38.5 (2018 22:22)  T(F): 99 (2018 12:57), Max: 101.3 (2018 22:22)  HR: 97 (2018 12:57) (95 - 127)  BP: 130/77 (2018 12:57) (107/83 - 145/87)  RR: 17 (2018 12:57) (14 - 17)  SpO2: 99% (2018 12:57) (99% - 100%)    POCT Blood Glucose.: 160 mg/dL (2018 11:44)    General: NAD  HEAD: Atraumatic, Normocephalic  ENT: EOMI, PERRL, conjunctiva and sclera clear, neck supple, no JVD, no erythema or exudates in the oropharynx  Chest: lungs clear to auscultation bilaterally, no crackles, wheezing, rhonchi   Heart: Nl S1 S2, NSR, no murmurs, gallops  Abd: Nl BS, soft, NT, ND   Ext: 2+ peripheral pulse in RLE, LLE DP pulse not palpable. left foot grossly swollen, tender to light palpation, bluish discoloration, cool,   Neuro: AOx3 no focal deficits  Skin: Warm, dry, intact  Psych: Affect nl       LABS:  CBC Full  -  (  @ 21:47 )                        10.6 g/dL  33.9 % )-----------( 31.3 %              24.2 pg  Mean Cell Volume : 77.4 fL  Auto Neutrophil # : 17.6 %  Auto Lymphocyte # : 21.42 K/uL  Auto Monocyte # : x  Auto Eosinophil # : x  Auto Basophil # : x        140  |  100  |  23  ----------------------------<  150<H>  3.8   |  28  |  0.83    Ca    10.9<H>      2018 21:47    TPro  7.9  /  Alb  3.7  /  TBili  0.2  /  DBili  x   /  AST  13  /  ALT  12  /  AlkPhos  85      PT/INR - ( 2018 22:25 )   PT: 12.9 SEC;   INR: 1.13          PTT - ( 2018 22:25 )  PTT:32.0 SEC  Urinalysis Basic - ( 2018 00:30 )    Color: YELLOW / Appearance: Lt TURBID / S.031 / pH: 5.5  Gluc: NEGATIVE / Ketone: TRACE  / Bili: NEGATIVE / Urobili: TRACE   Blood: SMALL / Protein: 100 / Nitrite: NEGATIVE   Leuk Esterase: MODERATE / RBC: 3-5 / WBC 26-50   Sq Epi: MODERATE / Non Sq Epi: x / Bacteria: NEGATIVE      VB.38/50/29/27  Lactate = 1.5      < from: CT Angio Abd Aorta w/run-off w/ IV Cont (18 @ 09:03) >    IMPRESSION:     Normal caliber and patent abdominal aorta.    Three vessel run off to both lower extremities. Left lower extremity   edema without subcutaneous emphysema.    Indeterminate left adrenal gland lesion measuring 3.0 cm. This can be   further evaluated with dedicated MRI adrenal gland protocol.    < end of copied text >    Consultants notes reviewed (on board already): Podiatry, Vascular, Cardiology

## 2018-11-14 NOTE — PROGRESS NOTE ADULT - ASSESSMENT
67 yo woman with a hx of DM, HTN and breast CA s/p lumpectomy presents for evaluation of left foot pain, swelling and redness x1 month. Patient febrile to 101.3, tachycardic to 120s, improved after IV fluid administration. ED noted streaking up left leg, begun on broad-spectrum abx and cultures sent.    PLAN:  -CT angio abdomen w/ runoff, f/u read  -oral pain medication  -cont home lisinopril 10 mg  -consistent carb diet  -daily labs in am  -follow up cultures  -cont IV abx: vanc, zosyn  -ISS  -DVT PPx: lovenox

## 2018-11-14 NOTE — H&P ADULT - HISTORY OF PRESENT ILLNESS
69 yo woman with a hx of DM and HTN presents for evaluation of left foot pain, swelling and redness x1 month. The patient saw Dr. Lema in clinic today and was advised to present to ED for evaluation. The pain is only minimally alleviated by OTC pain medications. She reports fevers at home, denies chills, N/V, or drainage from the left foot. The patient is minimally ambulatory w/ cane at home.    ED Course: Patient febrile to 101.3, tachycardic to 120s, improved after IV fluid administration. ED noted streaking up left leg, begun on broad-spectrum abx and cultures sent.

## 2018-11-14 NOTE — CONSULT NOTE ADULT - SUBJECTIVE AND OBJECTIVE BOX
Podiatry pager #: [phone]    Patient is a 68y old  Female who presents with a chief complaint of Left foot cellulitis (14 Nov 2018 14:06)      HPI:  69 yo woman with a hx of DM and HTN presents for evaluation of left foot pain, swelling and redness x1 month. The patient saw Dr. Lema in clinic today and was advised to present to ED for evaluation. The pain is only minimally alleviated by OTC pain medications. She reports fevers and chills at home, denies N/V, or drainage from the left foot. The patient is minimally ambulatory w/ cane at home.    ED Course: Patient febrile to 101.3, tachycardic to 120s, improved after IV fluid administration. ED noted streaking up left leg, begun on broad-spectrum abx and cultures sent. (14 Nov 2018 00:22)      PAST MEDICAL & SURGICAL HISTORY:  DM (diabetes mellitus)  HTN (hypertension)  Breast CA  No significant past surgical history      MEDICATIONS  (STANDING):  dextrose 5% + sodium chloride 0.45%. 1000 milliLiter(s) (75 mL/Hr) IV Continuous <Continuous>  dextrose 5%. 1000 milliLiter(s) (50 mL/Hr) IV Continuous <Continuous>  dextrose 50% Injectable 12.5 Gram(s) IV Push once  heparin  Infusion 1200 Unit(s)/Hr (12 mL/Hr) IV Continuous <Continuous>  insulin lispro (HumaLOG) corrective regimen sliding scale   SubCutaneous three times a day before meals  lisinopril 10 milliGRAM(s) Oral daily  vancomycin  IVPB 1000 milliGRAM(s) IV Intermittent every 12 hours    MEDICATIONS  (PRN):  acetaminophen   Tablet .. 975 milliGRAM(s) Oral every 6 hours PRN Mild Pain (1 - 3)  dextrose 40% Gel 15 Gram(s) Oral once PRN Blood Glucose LESS THAN 70 milliGRAM(s)/deciliter  glucagon  Injectable 1 milliGRAM(s) IntraMuscular once PRN Glucose LESS THAN 70 milligrams/deciliter  oxyCODONE    IR 5 milliGRAM(s) Oral every 6 hours PRN Moderate Pain (4 - 6)      Allergies    No Known Allergies    Intolerances        VITALS:    Vital Signs Last 24 Hrs  T(C): 37.2 (14 Nov 2018 12:57), Max: 38.5 (13 Nov 2018 22:22)  T(F): 99 (14 Nov 2018 12:57), Max: 101.3 (13 Nov 2018 22:22)  HR: 97 (14 Nov 2018 12:57) (95 - 127)  BP: 130/77 (14 Nov 2018 12:57) (107/83 - 145/87)  BP(mean): --  RR: 17 (14 Nov 2018 12:57) (14 - 17)  SpO2: 99% (14 Nov 2018 12:57) (99% - 100%)    LABS:                          10.6   21.42 )-----------( 454      ( 13 Nov 2018 21:47 )             33.9       11-13    140  |  100  |  23  ----------------------------<  150<H>  3.8   |  28  |  0.83    Ca    10.9<H>      13 Nov 2018 21:47    TPro  7.9  /  Alb  3.7  /  TBili  0.2  /  DBili  x   /  AST  13  /  ALT  12  /  AlkPhos  85  11-13      CAPILLARY BLOOD GLUCOSE      POCT Blood Glucose.: 160 mg/dL (14 Nov 2018 11:44)  POCT Blood Glucose.: 108 mg/dL (14 Nov 2018 09:45)      PT/INR - ( 13 Nov 2018 22:25 )   PT: 12.9 SEC;   INR: 1.13          PTT - ( 13 Nov 2018 22:25 )  PTT:32.0 SEC    LOWER EXTREMITY PHYSICAL EXAM:    Vasular: DP/PT 0/4, B/L, CFT sluggish B/L, Temperature gradient cold to touch to the left foot distal to talus.   Neuro: Epicritic sensation diminished to the level of digits, B/L.  Musculoskeletal/Ortho: no gross deformity to the b/l feet.   Skin: dusky appearance to the left foot, pain to touch to the left LE. Red streaking going proximally to the LLE. No open wound, no fluctuance to the left foot.       RADIOLOGY & ADDITIONAL STUDIES:    < from: Xray Foot AP + Lateral, Left (11.14.18 @ 01:24) >    EXAM:  RAD ANKLE 2 VIEWS LEFT      EXAM:  RAD FOOT 2 VIEWS LEFT      EXAM:  RAD TIB-FIB LT        PROCEDURE DATE:  Nov 14 2018         INTERPRETATION:  CLINICAL INFORMATION: Left foot pain. Concern for   osteomyelitis.    TECHNIQUE: 2 views of the left leg, 2 views of the left ankle, and 2   views of the left foot     COMPARISON: None    FINDINGS:    Left leg: There is no acute fracture or dislocation. The joint spaces are   preserved. Soft tissues are unremarkable. There is no radiographic   evidence of osteomyelitis.    Left ankle: There is no acute fracture or dislocation. The joint spaces   are preserved. Soft tissues are unremarkable. There is no radiographic   evidence of osteomyelitis.    Left foot:   There are no fractures. No subcutaneous air or bone   destruction to suggest osteomyelitis.    IMPRESSION:    There is no plain film radiographic evidence of osteomyelitis.    < end of copied text >

## 2018-11-14 NOTE — H&P ADULT - ASSESSMENT
67 yo woman with a hx of DM, HTN and breast CA s/p lumpectomy presents for evaluation of left foot pain, swelling and redness x1 month. Patient febrile to 101.3, tachycardic to 120s, improved after IV fluid administration. ED noted streaking up left leg, begun on broad-spectrum abx and cultures sent.    PLAN:  -admit to C team, Dr. Lema  -oral pain medication  -EKG now to evaluate tachycardia  -restart home lisinopril 10 mg  -consistent carb diet  -daily labs in am  -follow up cultures  -cont IV abx: vanc, zosyn  -ISS  -DVT PPx: lovenox    Patient discussed with Dr. Lema 69 yo woman with a hx of DM, HTN and breast CA s/p lumpectomy presents for evaluation of left foot pain, swelling and redness x1 month. Patient febrile to 101.3, tachycardic to 120s, improved after IV fluid administration. ED noted streaking up left leg, begun on broad-spectrum abx and cultures sent.    PLAN:  -admit to C team, Dr. Lema  -CT angio abdomen w/ runoff  -oral pain medication  -EKG now to evaluate tachycardia  -restart home lisinopril 10 mg  -consistent carb diet  -daily labs in am  -follow up cultures  -cont IV abx: vanc, zosyn  -ISS  -DVT PPx: lovenox    Patient discussed with Dr. Lema

## 2018-11-14 NOTE — CONSULT NOTE ADULT - SUBJECTIVE AND OBJECTIVE BOX
SURGICAL ONCOLOGY CONSULT NOTE    Patient is a 68y old  Female who presents with a chief complaint of Left foot cellulitis (14 Nov 2018 14:35), surgical oncology consulted for left adrenal incidentaloma on CT scan    HPI: 67 yo woman with a hx of DM and HTN diagnosed in 2016 and breast cancer diagnosed in 1993 s/p lumpectomy and chemoradiation admitted for evaluation of left foot pain, swelling and redness x1 month. Patient underwent CTA abdomen/pelvis with bilateral lower extremity run-off on 11/14 in the morning which showed left adrenal incidentaloma of 3cm x 2.8cm with nonspecific inguinal node 1.4x1.2cm. Due to the nature of CTA, it is difficult to characterize the the incidentaloma. Patient herself denies any headache, palpitation, diaphoresis, abdominal or flank pain.

## 2018-11-14 NOTE — CONSULT NOTE ADULT - PROBLEM SELECTOR RECOMMENDATION 5
Pt on heparin gtt, will d/c after discussion w vascular  Start heparin sq for dvt ppx if ok w vascular  DASH diet Pt on heparin gtt, will d/c after confirmation from vascular attending   Start lovenox for dvt ppx if ok w vascular  DASH diet Pt on heparin gtt, will d/c after confirmation from vascular attending   Start lovenox for dvt ppx if ok w vascular once off anticoagulation.   DASH diet

## 2018-11-14 NOTE — H&P ADULT - NSHPLABSRESULTS_GEN_ALL_CORE
CBC (11-13 @ 21:47)                              10.6<L>                         21.42<H>  )----------------(  454<H>     79.0<H>% Neutrophils, 10.5<L>% Lymphocytes, ANC: 16.94<H>                              33.9<L>                BMP (11-13 @ 21:47)             140     |  100     |  23    		Ca++ --      Ca 10.9<H>             ---------------------------------( 150<H>		Mg --                 3.8     |  28      |  0.83  			Ph --        LFTs (11-13 @ 21:47)      TPro 7.9 / Alb 3.7 / TBili 0.2 / DBili -- / AST 13 / ALT 12 / AlkPhos 85    Coags (11-13 @ 22:25)  aPTT 32.0 / INR 1.13 / PT 12.9        IMAGING: none

## 2018-11-14 NOTE — CONSULT NOTE ADULT - ASSESSMENT
*************************************INCOMPLETE*******************************************PENDING ATTENDING REVIEW***************  Patient is a 68 year-old woman with a past medical history of Type 2 DM, HTN, breast lumpectomy 1993, current smoker referred for evaluation of painful cold left foot and edema x 1 mo being evaluated for possible surgical intervention  -RCRI 0.9% for high risk surgery, ambulatory with a cane at baseline. No evidence of acute MI or congestive heart failure or major cardiac contraindications at this time  -Plan per primary team Patient is a 68 year-old woman with a past medical history of Type 2 DM, HTN, breast lumpectomy 1993, current smoker referred for evaluation of painful cold left foot and edema x 1 admitted with sepsis of unknown origin  -RCRI 0.9% for high risk surgery, ambulatory with a cane at baseline. No evidence of acute MI or congestive heart failure at this time.  -Patient not optimized at this time since as there is no specifically planned procedure.  -Plan per primary team Patient is a 68 year-old woman with a past medical history of Type 2 DM, HTN, breast lumpectomy 1993, current smoker referred for evaluation of painful cold left foot and edema x 1 admitted with sepsis of unknown origin  -RCRI 0.9% for high risk surgery, ambulatory with a cane at baseline. No evidence of acute MI or congestive heart failure or valvulopathy on exam  -Patient not optimized at this time as there is no specifically planned procedure.  -Plan per primary team

## 2018-11-14 NOTE — H&P ADULT - NSHPPHYSICALEXAM_GEN_ALL_CORE
GEN: NAD, resting quietly  PULM: symmetric chest rise bilaterally, no increased WOB  CV: tachycardic, irregular; RLE dopplerable DP and PT signals; LLE dopplerable PT, no DP signal; LLE cool compared to right, no ulcers or wounds  ABD: soft, NTND  EXTR: no cyanosis or edema, moving all extremities GEN: NAD, resting quietly  PULM: symmetric chest rise bilaterally, no increased WOB  CV: tachycardic, irregular; RLE dopplerable DP and PT signals; LLE dopplerable PT, no DP signal  ABD: soft, NTND  EXTR: LLE cool compared to right, no ulcers or wounds; left foot edematous and TTP to palpation, no cyanosis

## 2018-11-14 NOTE — CONSULT NOTE ADULT - SUBJECTIVE AND OBJECTIVE BOX
Date of Admission: 11/14/18    CHIEF COMPLAINT: L foot pain    HISTORY OF PRESENT ILLNESS: Patient is a 68 year-old woman with a past medical history of Type 2 DM, HTN, breast lumpectomy 1993, current smoker presents for evaluation of left foot pain, swelling and redness x1 month. Associated with pain on bearing weight although unrelated to activity. Endorses paresthesia/hypersensitivity and feeling cold of the left extremity. She was advised to present to ED for evaluation during outpatient office visit. Pain sharp, 8/10, does ont radiate, is mildly alleviated by OTC pain medications. ROS + chills and subjective fevers at home. Denies chest pain/angina, SOB, palpitations, dysuria, urgency, diarrhea, nausea, vomiting, melena, hematochezia, purulent drainage of foot, trauma, recent travel, or sick contacts. Upon arrival, had fever Tm 101.3F, tachycardic to 120s. EKG notable for sinus tachyacardia, no acute ST/T wave changes.        Allergies    No Known Allergies    Intolerances    	    MEDICATIONS:  heparin  Infusion 1200 Unit(s)/Hr IV Continuous <Continuous>  lisinopril 10 milliGRAM(s) Oral daily    piperacillin/tazobactam IVPB. 3.375 Gram(s) IV Intermittent every 8 hours  vancomycin  IVPB 1000 milliGRAM(s) IV Intermittent every 12 hours      acetaminophen   Tablet .. 975 milliGRAM(s) Oral every 6 hours PRN  oxyCODONE    IR 5 milliGRAM(s) Oral every 6 hours PRN      dextrose 40% Gel 15 Gram(s) Oral once PRN  dextrose 50% Injectable 12.5 Gram(s) IV Push once  glucagon  Injectable 1 milliGRAM(s) IntraMuscular once PRN  insulin lispro (HumaLOG) corrective regimen sliding scale   SubCutaneous three times a day before meals    dextrose 5% + sodium chloride 0.45%. 1000 milliLiter(s) IV Continuous <Continuous>  dextrose 5%. 1000 milliLiter(s) IV Continuous <Continuous>      PAST MEDICAL & SURGICAL HISTORY:  DM (diabetes mellitus)  HTN (hypertension)  Breast CA  No significant past surgical history      FAMILY HISTORY:  No pertinent family history in first degree relatives      SOCIAL HISTORY:    [ ] Non-smoker  [x ] Smoker: 0.5 pk daily x 19 yrs  [ ] Alcohol: denies      REVIEW OF SYSTEMS:  General: no fatigue/malaise, weight loss/gain.  Skin: no rashes.  Ophthalmologic: no blurred vision, no loss of vision. 	  ENT: no sore throat, rhinorrhea, sinus congestion.  Respiratory: no SOB, cough or wheeze.  Gastrointestinal:  no N/V/D, no melena/hematemesis/hematochezia.  Genitourinary: no dysuria/hesitancy or hematuria.  Musculoskeletal: no myalgias or arthralgias.  Neurological: no changes in vision or hearing, no lightheadedness/dizziness, no syncope/near syncope	  Psychiatric: no unusual stress/anxiety.   Hematology/Lymphatics: no unusual bleeding, bruising and no lymphadenopathy.  Endocrine: no unusual thirst.   All others negative except as stated above and in HPI.    PHYSICAL EXAM:  T(C): 37.2 (11-14-18 @ 12:57), Max: 38.5 (11-13-18 @ 22:22)  HR: 97 (11-14-18 @ 12:57) (95 - 127)  BP: 130/77 (11-14-18 @ 12:57) (107/83 - 145/87)  RR: 17 (11-14-18 @ 12:57) (14 - 17)  SpO2: 99% (11-14-18 @ 12:57) (99% - 100%)  Wt(kg): --  I&O's Summary      Appearance: lying comfortably in bed, no acute distress	  HEENT:   Normal oral mucosa, PERRL, EOMI	  Lymphatic: No cervical or clavicular lymphadenopathy  Cardiovascular: Normal S1 S2, No JVD, No murmurs, No edema  Respiratory: Lungs clear to auscultation	bilaterally  Psychiatry: A & O x 3, Mood & affect appropriate  Gastrointestinal:  Soft, Non-tender, + BS	  Skin: No rashes, No ecchymoses, No cyanosis	  Neurologic: Non-focal  Extremities: Normal range of motion, No clubbing, cyanosis or edema  Vascular: Peripheral pulses palpable 2+ bilaterally        LABS:	 	    CBC Full  -  ( 13 Nov 2018 21:47 )  WBC Count : 21.42 K/uL  Hemoglobin : 10.6 g/dL  Hematocrit : 33.9 %  Platelet Count - Automated : 454 K/uL  Mean Cell Volume : 77.4 fL  Mean Cell Hemoglobin : 24.2 pg  Mean Cell Hemoglobin Concentration : 31.3 %  Auto Neutrophil # : 16.94 K/uL  Auto Lymphocyte # : 2.24 K/uL  Auto Monocyte # : 1.96 K/uL  Auto Eosinophil # : 0.04 K/uL  Auto Basophil # : 0.09 K/uL  Auto Neutrophil % : 79.0 %  Auto Lymphocyte % : 10.5 %  Auto Monocyte % : 9.2 %  Auto Eosinophil % : 0.2 %  Auto Basophil % : 0.4 %    11-13    140  |  100  |  23  ----------------------------<  150<H>  3.8   |  28  |  0.83    Ca    10.9<H>      13 Nov 2018 21:47    TPro  7.9  /  Alb  3.7  /  TBili  0.2  /  DBili  x   /  AST  13  /  ALT  12  /  AlkPhos  85  11-13      proBNP:   Lipid Profile:   HgA1c: Hemoglobin A1C, Whole Blood: 6.3 % (11-14 @ 06:15)    TSH:       CARDIAC MARKERS:            TELEMETRY: 	    ECG:  Sinus tachycardia 125 bpm, no ischemic ST/T wave changes  RADIOLOGY:  OTHER: 	    PREVIOUS DIAGNOSTIC TESTING:    [ ] Echocardiogram:  [ ]  Catheterization:  [ ] Stress Test:  	  	  ASSESSMENT/PLAN:

## 2018-11-14 NOTE — CONSULT NOTE ADULT - ASSESSMENT
67 yo F w pmhx of T2DM, HTN presenting with 1 month of LLE swelling and discoloration admitted for cellulitis

## 2018-11-15 LAB
APTT BLD: 36.6 SEC — HIGH (ref 27.5–36.3)
APTT BLD: 52.2 SEC — HIGH (ref 27.5–36.3)
APTT BLD: 53.6 SEC — HIGH (ref 27.5–36.3)
FERRITIN SERPL-MCNC: 582.5 NG/ML — HIGH (ref 15–150)
GLUCOSE BLDC GLUCOMTR-MCNC: 121 MG/DL — HIGH (ref 70–99)
GLUCOSE BLDC GLUCOMTR-MCNC: 132 MG/DL — HIGH (ref 70–99)
GLUCOSE BLDC GLUCOMTR-MCNC: 133 MG/DL — HIGH (ref 70–99)
HCT VFR BLD CALC: 32.7 % — LOW (ref 34.5–45)
HGB BLD-MCNC: 10.2 G/DL — LOW (ref 11.5–15.5)
IRON SATN MFR SERPL: 21 UG/DL — LOW (ref 30–160)
IRON SATN MFR SERPL: 211 UG/DL — SIGNIFICANT CHANGE UP (ref 140–530)
MCHC RBC-ENTMCNC: 24.5 PG — LOW (ref 27–34)
MCHC RBC-ENTMCNC: 31.2 % — LOW (ref 32–36)
MCV RBC AUTO: 78.4 FL — LOW (ref 80–100)
NRBC # FLD: 0 — SIGNIFICANT CHANGE UP
PLATELET # BLD AUTO: 462 K/UL — HIGH (ref 150–400)
PMV BLD: 10.6 FL — SIGNIFICANT CHANGE UP (ref 7–13)
RBC # BLD: 4.17 M/UL — SIGNIFICANT CHANGE UP (ref 3.8–5.2)
RBC # FLD: 17.6 % — HIGH (ref 10.3–14.5)
SPECIMEN SOURCE: SIGNIFICANT CHANGE UP
SPECIMEN SOURCE: SIGNIFICANT CHANGE UP
UIBC SERPL-MCNC: 190 UG/DL — SIGNIFICANT CHANGE UP (ref 110–370)
VANCOMYCIN TROUGH SERPL-MCNC: 10.4 UG/ML — SIGNIFICANT CHANGE UP (ref 10–20)
WBC # BLD: 17.79 K/UL — HIGH (ref 3.8–10.5)
WBC # FLD AUTO: 17.79 K/UL — HIGH (ref 3.8–10.5)

## 2018-11-15 RX ORDER — ACETAMINOPHEN 500 MG
650 TABLET ORAL ONCE
Qty: 0 | Refills: 0 | Status: COMPLETED | OUTPATIENT
Start: 2018-11-15 | End: 2018-11-15

## 2018-11-15 RX ORDER — HEPARIN SODIUM 5000 [USP'U]/ML
1500 INJECTION INTRAVENOUS; SUBCUTANEOUS
Qty: 25000 | Refills: 0 | Status: DISCONTINUED | OUTPATIENT
Start: 2018-11-15 | End: 2018-11-26

## 2018-11-15 RX ADMIN — OXYCODONE HYDROCHLORIDE 5 MILLIGRAM(S): 5 TABLET ORAL at 04:20

## 2018-11-15 RX ADMIN — HEPARIN SODIUM 12 UNIT(S)/HR: 5000 INJECTION INTRAVENOUS; SUBCUTANEOUS at 02:00

## 2018-11-15 RX ADMIN — HEPARIN SODIUM 17 UNIT(S)/HR: 5000 INJECTION INTRAVENOUS; SUBCUTANEOUS at 19:20

## 2018-11-15 RX ADMIN — OXYCODONE HYDROCHLORIDE 5 MILLIGRAM(S): 5 TABLET ORAL at 03:25

## 2018-11-15 RX ADMIN — SODIUM CHLORIDE 75 MILLILITER(S): 9 INJECTION, SOLUTION INTRAVENOUS at 18:39

## 2018-11-15 RX ADMIN — OXYCODONE HYDROCHLORIDE 5 MILLIGRAM(S): 5 TABLET ORAL at 15:39

## 2018-11-15 RX ADMIN — Medication 250 MILLIGRAM(S): at 01:59

## 2018-11-15 RX ADMIN — LISINOPRIL 10 MILLIGRAM(S): 2.5 TABLET ORAL at 05:49

## 2018-11-15 RX ADMIN — Medication 250 MILLIGRAM(S): at 18:44

## 2018-11-15 RX ADMIN — Medication 650 MILLIGRAM(S): at 23:24

## 2018-11-15 RX ADMIN — OXYCODONE HYDROCHLORIDE 5 MILLIGRAM(S): 5 TABLET ORAL at 16:05

## 2018-11-15 RX ADMIN — Medication 975 MILLIGRAM(S): at 10:39

## 2018-11-15 RX ADMIN — HEPARIN SODIUM 15 UNIT(S)/HR: 5000 INJECTION INTRAVENOUS; SUBCUTANEOUS at 02:30

## 2018-11-15 RX ADMIN — Medication 975 MILLIGRAM(S): at 11:15

## 2018-11-15 RX ADMIN — HEPARIN SODIUM 16 UNIT(S)/HR: 5000 INJECTION INTRAVENOUS; SUBCUTANEOUS at 10:39

## 2018-11-15 NOTE — PROGRESS NOTE ADULT - ASSESSMENT
69 yo F w pmhx of T2DM, HTN presenting with 1 month of LLE swelling and discoloration admitted for cellulitis      Problem/Recommendation - 1:  Problem: Cellulitis of foot, left. Recommendation: - Pt meeting sepsis criteria on admission; sepsis now resolved  - C/w vancomycin. Can d/c zosyn   - f/u blood cultures, if negative, downgrade antibiotics to Keflex  - Appreciate podiatry consult  - tylenol PRN for fever     Problem/Recommendation - 2:  ·  Problem: HTN (hypertension).  Recommendation: BP well controlled  c/w home med of lisinopril.      Problem/Recommendation - 3:  ·  Problem: DM (diabetes mellitus).  Recommendation: Blood glucose well controlled  A1C 6.3  C/w sliding scale insulin.      Problem/Recommendation - 4:  ·  Problem: Microcytic anemia.  Recommendation: Will workup with iron studies in am  Confirm pt has had outpt screening colonoscopy.      Problem/Recommendation - 5:  ·  Problem: Ischemia of LLE - c/w Heparin gtt for now, vasc to comment on need to continue AC long term, ? switch to NOAC

## 2018-11-15 NOTE — PROGRESS NOTE ADULT - SUBJECTIVE AND OBJECTIVE BOX
CHIEF COMPLAINT:Patient is a 68y old  Female who presents with a chief complaint of Left foot cellulitis (14 Nov 2018 18:53)    	  Interval history:      Allergies:  No Known Allergies      PAST MEDICAL & SURGICAL HISTORY:  DM (diabetes mellitus)  HTN (hypertension)  Breast CA  No significant past surgical history      FAMILY HISTORY:  No pertinent family history in first degree relatives      REVIEW OF SYSTEMS:  CONSTITUTIONAL: No fever, weight loss, or fatigue  EYES: No eye pain, visual disturbances, or discharge  NECK: No pain or stiffness  RESPIRATORY: No cough or wheezing, no shortness of breath  CARDIOVASCULAR: No chest pain, palpitations, dizziness, or leg swelling  GASTROINTESTINAL: No abdominal or epigastric pain. No nausea, vomiting, diarrhea or constipation  GENITOURINARY: No dysuria, urinary frequency or urgency, no hematuria  NEUROLOGICAL: No headaches, memory loss, loss of strength, numbness, or tremors  SKIN: No itching, burning, rashes, or lesions   MUSCULOSKELETAL: No joint pain or swelling; No muscle, back, or extremity pain    Medications:  MEDICATIONS  (STANDING):  dextrose 5% + sodium chloride 0.45%. 1000 milliLiter(s) (75 mL/Hr) IV Continuous <Continuous>  dextrose 5%. 1000 milliLiter(s) (50 mL/Hr) IV Continuous <Continuous>  dextrose 50% Injectable 12.5 Gram(s) IV Push once  heparin  Infusion 1500 Unit(s)/Hr (16 mL/Hr) IV Continuous <Continuous>  insulin lispro (HumaLOG) corrective regimen sliding scale   SubCutaneous three times a day before meals  lisinopril 10 milliGRAM(s) Oral daily  vancomycin  IVPB 1000 milliGRAM(s) IV Intermittent every 12 hours    MEDICATIONS  (PRN):  acetaminophen   Tablet .. 975 milliGRAM(s) Oral every 6 hours PRN Mild Pain (1 - 3)  dextrose 40% Gel 15 Gram(s) Oral once PRN Blood Glucose LESS THAN 70 milliGRAM(s)/deciliter  glucagon  Injectable 1 milliGRAM(s) IntraMuscular once PRN Glucose LESS THAN 70 milligrams/deciliter  oxyCODONE    IR 5 milliGRAM(s) Oral every 6 hours PRN Moderate Pain (4 - 6)    	    PHYSICAL EXAM:  T(C): 36.9 (11-15-18 @ 15:00), Max: 37.4 (11-15-18 @ 05:43)  HR: 90 (11-15-18 @ 15:00) (90 - 105)  BP: 149/92 (11-15-18 @ 15:00) (129/77 - 149/92)  RR: 16 (11-15-18 @ 15:00) (16 - 17)  SpO2: 100% (11-15-18 @ 15:00) (95% - 100%)  Wt(kg): --  I&O's Summary    14 Nov 2018 07:01  -  15 Nov 2018 07:00  --------------------------------------------------------  IN: 348 mL / OUT: 150 mL / NET: 198 mL        Appearance: Normal	  HEENT:   NCAT, PERRL, EOMI	  Lymphatic: No lymphadenopathy  Cardiovascular: Normal S1 S2, RRR  Respiratory: Lungs clear to auscultation BL  Psychiatry: A & O x 3, Mood & affect appropriate  Gastrointestinal:  Soft, Non-tender, + BS  Skin: No rashes, No ecchymoses, No cyanosis	  Neurologic: Non-focal  Extremities: Normal range of motion, No clubbing, cyanosis or edema    	  LABS:	 	    CARDIAC MARKERS:                                10.2   17.79 )-----------( 462      ( 15 Nov 2018 05:36 )             32.7     11-13    140  |  100  |  23  ----------------------------<  150<H>  3.8   |  28  |  0.83    Ca    10.9<H>      13 Nov 2018 21:47    TPro  7.9  /  Alb  3.7  /  TBili  0.2  /  DBili  x   /  AST  13  /  ALT  12  /  AlkPhos  85  11-13    proBNP:   Lipid Profile:   HgA1c:   TSH:

## 2018-11-15 NOTE — CHART NOTE - NSCHARTNOTEFT_GEN_A_CORE
Pt transferred from vascular service to medicine team. Discussed plan of care with vascular team - awaiting vasc recs in regards to AC. Will follow up recs.

## 2018-11-16 LAB
APTT BLD: 69.7 SEC — HIGH (ref 27.5–36.3)
APTT BLD: 81 SEC — HIGH (ref 27.5–36.3)
APTT BLD: 90.2 SEC — HIGH (ref 27.5–36.3)
BUN SERPL-MCNC: 7 MG/DL — SIGNIFICANT CHANGE UP (ref 7–23)
CALCIUM SERPL-MCNC: 10 MG/DL — SIGNIFICANT CHANGE UP (ref 8.4–10.5)
CHLORIDE SERPL-SCNC: 98 MMOL/L — SIGNIFICANT CHANGE UP (ref 98–107)
CO2 SERPL-SCNC: 26 MMOL/L — SIGNIFICANT CHANGE UP (ref 22–31)
CREAT SERPL-MCNC: 0.7 MG/DL — SIGNIFICANT CHANGE UP (ref 0.5–1.3)
GLUCOSE BLDC GLUCOMTR-MCNC: 152 MG/DL — HIGH (ref 70–99)
GLUCOSE BLDC GLUCOMTR-MCNC: 172 MG/DL — HIGH (ref 70–99)
GLUCOSE BLDC GLUCOMTR-MCNC: 173 MG/DL — HIGH (ref 70–99)
GLUCOSE SERPL-MCNC: 128 MG/DL — HIGH (ref 70–99)
HCT VFR BLD CALC: 33.1 % — LOW (ref 34.5–45)
HGB BLD-MCNC: 10.5 G/DL — LOW (ref 11.5–15.5)
MAGNESIUM SERPL-MCNC: 1.6 MG/DL — SIGNIFICANT CHANGE UP (ref 1.6–2.6)
MCHC RBC-ENTMCNC: 24.8 PG — LOW (ref 27–34)
MCHC RBC-ENTMCNC: 31.7 % — LOW (ref 32–36)
MCV RBC AUTO: 78.1 FL — LOW (ref 80–100)
NRBC # FLD: 0 — SIGNIFICANT CHANGE UP
PHOSPHATE SERPL-MCNC: 2.7 MG/DL — SIGNIFICANT CHANGE UP (ref 2.5–4.5)
PLATELET # BLD AUTO: 407 K/UL — HIGH (ref 150–400)
PMV BLD: 11.4 FL — SIGNIFICANT CHANGE UP (ref 7–13)
POTASSIUM SERPL-MCNC: 3.6 MMOL/L — SIGNIFICANT CHANGE UP (ref 3.5–5.3)
POTASSIUM SERPL-SCNC: 3.6 MMOL/L — SIGNIFICANT CHANGE UP (ref 3.5–5.3)
RBC # BLD: 4.24 M/UL — SIGNIFICANT CHANGE UP (ref 3.8–5.2)
RBC # FLD: 17.4 % — HIGH (ref 10.3–14.5)
SODIUM SERPL-SCNC: 137 MMOL/L — SIGNIFICANT CHANGE UP (ref 135–145)
WBC # BLD: 19.23 K/UL — HIGH (ref 3.8–10.5)
WBC # FLD AUTO: 19.23 K/UL — HIGH (ref 3.8–10.5)

## 2018-11-16 PROCEDURE — 99223 1ST HOSP IP/OBS HIGH 75: CPT | Mod: GC

## 2018-11-16 PROCEDURE — 99223 1ST HOSP IP/OBS HIGH 75: CPT

## 2018-11-16 RX ORDER — MORPHINE SULFATE 50 MG/1
1 CAPSULE, EXTENDED RELEASE ORAL ONCE
Qty: 0 | Refills: 0 | Status: DISCONTINUED | OUTPATIENT
Start: 2018-11-16 | End: 2018-11-16

## 2018-11-16 RX ORDER — CEFAZOLIN SODIUM 1 G
1000 VIAL (EA) INJECTION EVERY 8 HOURS
Qty: 0 | Refills: 0 | Status: DISCONTINUED | OUTPATIENT
Start: 2018-11-16 | End: 2018-11-21

## 2018-11-16 RX ADMIN — MORPHINE SULFATE 1 MILLIGRAM(S): 50 CAPSULE, EXTENDED RELEASE ORAL at 22:34

## 2018-11-16 RX ADMIN — LISINOPRIL 10 MILLIGRAM(S): 2.5 TABLET ORAL at 06:08

## 2018-11-16 RX ADMIN — Medication 250 MILLIGRAM(S): at 06:08

## 2018-11-16 RX ADMIN — HEPARIN SODIUM 17 UNIT(S)/HR: 5000 INJECTION INTRAVENOUS; SUBCUTANEOUS at 01:56

## 2018-11-16 RX ADMIN — SODIUM CHLORIDE 75 MILLILITER(S): 9 INJECTION, SOLUTION INTRAVENOUS at 17:21

## 2018-11-16 RX ADMIN — Medication 100 MILLIGRAM(S): at 22:34

## 2018-11-16 RX ADMIN — Medication 1: at 08:35

## 2018-11-16 RX ADMIN — HEPARIN SODIUM 17 UNIT(S)/HR: 5000 INJECTION INTRAVENOUS; SUBCUTANEOUS at 08:16

## 2018-11-16 RX ADMIN — MORPHINE SULFATE 1 MILLIGRAM(S): 50 CAPSULE, EXTENDED RELEASE ORAL at 22:50

## 2018-11-16 RX ADMIN — Medication 250 MILLIGRAM(S): at 13:08

## 2018-11-16 RX ADMIN — SODIUM CHLORIDE 75 MILLILITER(S): 9 INJECTION, SOLUTION INTRAVENOUS at 22:35

## 2018-11-16 RX ADMIN — HEPARIN SODIUM 17 UNIT(S)/HR: 5000 INJECTION INTRAVENOUS; SUBCUTANEOUS at 19:13

## 2018-11-16 RX ADMIN — Medication 1: at 17:12

## 2018-11-16 NOTE — CONSULT NOTE ADULT - SUBJECTIVE AND OBJECTIVE BOX
RENATA DIXON  [id number]    HISTORY OF PRESENT ILLNESS:    PAST MEDICAL & SURGICAL HISTORY:  DM (diabetes mellitus)  HTN (hypertension)  Breast CA  No significant past surgical history      Review of Systems:  Gen:  fevers,   HEENT: No blurry vision, no difficulty swallowing  CVS: No chest pain/palpitations  Resp: No SOB/wheezing  GI: No N/V/C/D/abdominal pain  MSK: per HPI   Skin: per HPI   Neuro: No headaches, no focal weakness, sensory loss    MEDICATIONS  (STANDING):  ceFAZolin   IVPB 1000 milliGRAM(s) IV Intermittent every 8 hours  dextrose 5% + sodium chloride 0.45%. 1000 milliLiter(s) (75 mL/Hr) IV Continuous <Continuous>  dextrose 5%. 1000 milliLiter(s) (50 mL/Hr) IV Continuous <Continuous>  dextrose 50% Injectable 12.5 Gram(s) IV Push once  heparin  Infusion 1500 Unit(s)/Hr (17 mL/Hr) IV Continuous <Continuous>  insulin lispro (HumaLOG) corrective regimen sliding scale   SubCutaneous three times a day before meals  lisinopril 10 milliGRAM(s) Oral daily    MEDICATIONS  (PRN):  acetaminophen   Tablet .. 975 milliGRAM(s) Oral every 6 hours PRN Mild Pain (1 - 3)  dextrose 40% Gel 15 Gram(s) Oral once PRN Blood Glucose LESS THAN 70 milliGRAM(s)/deciliter  glucagon  Injectable 1 milliGRAM(s) IntraMuscular once PRN Glucose LESS THAN 70 milligrams/deciliter  oxyCODONE    IR 5 milliGRAM(s) Oral every 6 hours PRN Moderate Pain (4 - 6)      Allergies    No Known Allergies    Intolerances        PERTINENT MEDICATION HISTORY:    SOCIAL HISTORY:  Past smoker. No alcohol. No other drugs.     FAMILY HISTORY:  No family hx of RA, SLE      Vital Signs Last 24 Hrs  T(C): 37.6 (16 Nov 2018 15:12), Max: 38.2 (15 Nov 2018 22:36)  T(F): 99.7 (16 Nov 2018 15:12), Max: 100.7 (15 Nov 2018 22:36)  HR: 105 (16 Nov 2018 15:12) (74 - 113)  BP: 143/76 (16 Nov 2018 15:12) (119/75 - 151/97)  BP(mean): --  RR: 18 (16 Nov 2018 15:12) (16 - 18)  SpO2: 98% (16 Nov 2018 15:12) (96% - 100%)    < from: CT Angio Abd Aorta w/run-off w/ IV Cont (11.14.18 @ 09:03) >  EXAM:  CT ANGIO ABD AOR W RUN(W)AW IC        PROCEDURE DATE:  Nov 14 2018         INTERPRETATION:  CLINICAL INFORMATION: Foot pain and poor circulation.    CT ANGIOGRAM ABDOMEN, PELVIS, AND LOWER EXTREMITIES:    TECHNIQUE:   Initially, nonenhanced CT was obtained through the calves. Then,   following the rapid administration of intravenous contrast, CT   angiography was performed through the abdomen, pelvis, and lower   extremities down to the toes.  Delayed images through the calves were   also obtained. Sagittal and coronal reformats as well as 3D   reconstructions were performed.    110 mls of Omnipaque 350 was administered intravenously without   complication and 15 mls were discarded.    COMPARISON: None available.    FINDINGS:    CENTRAL ARTERIAL SYSTEM:     Normal caliber abdominal aorta without evidence of dissection or   aneurysm. The celiac artery, SMA, bilateral renal arteries and LAWRENCE are   patent and normal in caliber.    RIGHT LOWER EXTREMITY:    Mild calcified plaque of the right common iliac artery which is patent   and normal in caliber. Right internal and external iliac arteries are   patent. Patent right common femoral artery. Profunda femoral artery is   patent. The superficial femoral artery is patent. The popliteal artery is   patent. Patent anterior tibial artery and tibioperoneal trunk. Patent   peroneal artery to the ankle. Patent posterior tibial artery.      LEFT LOWER EXTREMITY:    Calcified plaque involving the left common iliac artery which is patent.   Patent internal and external iliac arteries. Common femoral artery and   profunda femoral artery are patent. The superficial femoral artery is   patent. The popliteal artery is patent. The anterior tibial artery and   tibioperoneal trunk are patent. Patent peroneal artery to the ankle.   Patent posterior tibial artery.    ADDITIONAL FINDINGS:     Multiple hepatic cysts, the largest in the right hepatic lobe measuring   7.0 cm (series 5 image 22). Right adrenal gland thickening. Left adrenal   gland lesion measures 3.0 x 2.8 cm (series 5 image 27) and is   indeterminate on this examination. Bilateral renal cysts and   hypoattenuating lesions that are too small to characterize. Nonspecific   left inguinal lymph nodes measuring up to 1.4 x 1.2 cm (series 5 image   119). Edema involving the left lower extremity with overlying skin   thickening, predominantly in the lateral compartment. No subcutaneous   emphysema.      IMPRESSION:     Normal caliber and patent abdominal aorta.    Three vessel run off to both lower extremities. Left lower extremity   edema without subcutaneous emphysema.    Indeterminate left adrenal gland lesion measuring 3.0 cm. This can be   further evaluated with dedicated MRI adrenal gland protocol.                  KRISTINA ARDON M.D., ATTENDING RADIOLOGIST  This document has been electronically signed. Nov 14 2018 10:58AM                  < end of copied text >  Daily     Daily     Physical Exam:  General: No apparent distress  HEENT: no oral ulcers, no cervical lymphadenopathy   CVS: +S1/S2, RRR  Resp: CTA b/l, no w, c  GI: Soft, NT/ND  MSK: L foot swollen up to ankle. Past ankle, foot is cool. There is dry skin.   Neuro: AAOx3  Skin: slight erythema of medial malleolus     LABS:                        10.5   19.23 )-----------( 407      ( 16 Nov 2018 05:36 )             33.1     11-16    137  |  98  |  7   ----------------------------<  128<H>  3.6   |  26  |  0.70    Ca    10.0      16 Nov 2018 05:35  Phos  2.7     11-16  Mg     1.6     11-16      PTT - ( 16 Nov 2018 05:35 )  PTT:81.0 SEC      RADIOLOGY & ADDITIONAL STUDIES: RENATA DESTINY  [id number]    HISTORY OF PRESENT ILLNESS:  Pt is a 67 yo woman, with 25-pack-year of smoking history, , presented to the ED 2 days ago sent by podiatrist for left food pain, swelling and discoloration for 2 months. Pt states that her symptoms started at least 2 months ago and worsened significantly over the past 2 weeks. She describes her left foot pain as constant and has episodes of 10/10 sharping shooting pain up her left knee. No aggravating factors, and medications received in hospital including heparin drip for cellulitis have made her symptoms improve slightly. Pt reports inability to walk due to pain and expresses frustration about process of care.   She has been diagnosed with bilateral arthritis in big toes as well as peripheral neuropathy by her podiatrist, however her pain now feels different from her arthritic pain. Also has had outside dermatopathology biopsy done in Oct 2018 showing patterns of endarteritis obliterans. On admission, vascular work-up showed no arterial stenosis, and she has been treated with broad-spectrum abx for cellulitis.    Fevers, 10 lb weight loss. No abdominal pain, n,v,d. Pt refused further ROSs      PAST MEDICAL & SURGICAL HISTORY:  DM (diabetes mellitus)  HTN (hypertension)  Breast CA  No significant past surgical history      Review of Systems:  as per HPI    MEDICATIONS  (STANDING):  ceFAZolin   IVPB 1000 milliGRAM(s) IV Intermittent every 8 hours  dextrose 5% + sodium chloride 0.45%. 1000 milliLiter(s) (75 mL/Hr) IV Continuous <Continuous>  dextrose 5%. 1000 milliLiter(s) (50 mL/Hr) IV Continuous <Continuous>  dextrose 50% Injectable 12.5 Gram(s) IV Push once  heparin  Infusion 1500 Unit(s)/Hr (17 mL/Hr) IV Continuous <Continuous>  insulin lispro (HumaLOG) corrective regimen sliding scale   SubCutaneous three times a day before meals  lisinopril 10 milliGRAM(s) Oral daily    MEDICATIONS  (PRN):  acetaminophen   Tablet .. 975 milliGRAM(s) Oral every 6 hours PRN Mild Pain (1 - 3)  dextrose 40% Gel 15 Gram(s) Oral once PRN Blood Glucose LESS THAN 70 milliGRAM(s)/deciliter  glucagon  Injectable 1 milliGRAM(s) IntraMuscular once PRN Glucose LESS THAN 70 milligrams/deciliter  oxyCODONE    IR 5 milliGRAM(s) Oral every 6 hours PRN Moderate Pain (4 - 6)      Allergies    No Known Allergies    Intolerances        PERTINENT MEDICATION HISTORY:    SOCIAL HISTORY:  Past smoker. No alcohol. No other drugs.     FAMILY HISTORY:  No family hx of RA, SLE      Vital Signs Last 24 Hrs  T(C): 37.6 (16 Nov 2018 15:12), Max: 38.2 (15 Nov 2018 22:36)  T(F): 99.7 (16 Nov 2018 15:12), Max: 100.7 (15 Nov 2018 22:36)  HR: 105 (16 Nov 2018 15:12) (74 - 113)  BP: 143/76 (16 Nov 2018 15:12) (119/75 - 151/97)  BP(mean): --  RR: 18 (16 Nov 2018 15:12) (16 - 18)  SpO2: 98% (16 Nov 2018 15:12) (96% - 100%)    < from: CT Angio Abd Aorta w/run-off w/ IV Cont (11.14.18 @ 09:03) >  EXAM:  CT ANGIO ABD AOR W RUN(W)AW IC        PROCEDURE DATE:  Nov 14 2018         INTERPRETATION:  CLINICAL INFORMATION: Foot pain and poor circulation.    CT ANGIOGRAM ABDOMEN, PELVIS, AND LOWER EXTREMITIES:    TECHNIQUE:   Initially, nonenhanced CT was obtained through the calves. Then,   following the rapid administration of intravenous contrast, CT   angiography was performed through the abdomen, pelvis, and lower   extremities down to the toes.  Delayed images through the calves were   also obtained. Sagittal and coronal reformats as well as 3D   reconstructions were performed.    110 mls of Omnipaque 350 was administered intravenously without   complication and 15 mls were discarded.    COMPARISON: None available.    FINDINGS:    CENTRAL ARTERIAL SYSTEM:     Normal caliber abdominal aorta without evidence of dissection or   aneurysm. The celiac artery, SMA, bilateral renal arteries and LAWRENCE are   patent and normal in caliber.    RIGHT LOWER EXTREMITY:    Mild calcified plaque of the right common iliac artery which is patent   and normal in caliber. Right internal and external iliac arteries are   patent. Patent right common femoral artery. Profunda femoral artery is   patent. The superficial femoral artery is patent. The popliteal artery is   patent. Patent anterior tibial artery and tibioperoneal trunk. Patent   peroneal artery to the ankle. Patent posterior tibial artery.      LEFT LOWER EXTREMITY:    Calcified plaque involving the left common iliac artery which is patent.   Patent internal and external iliac arteries. Common femoral artery and   profunda femoral artery are patent. The superficial femoral artery is   patent. The popliteal artery is patent. The anterior tibial artery and   tibioperoneal trunk are patent. Patent peroneal artery to the ankle.   Patent posterior tibial artery.    ADDITIONAL FINDINGS:     Multiple hepatic cysts, the largest in the right hepatic lobe measuring   7.0 cm (series 5 image 22). Right adrenal gland thickening. Left adrenal   gland lesion measures 3.0 x 2.8 cm (series 5 image 27) and is   indeterminate on this examination. Bilateral renal cysts and   hypoattenuating lesions that are too small to characterize. Nonspecific   left inguinal lymph nodes measuring up to 1.4 x 1.2 cm (series 5 image   119). Edema involving the left lower extremity with overlying skin   thickening, predominantly in the lateral compartment. No subcutaneous   emphysema.      IMPRESSION:     Normal caliber and patent abdominal aorta.    Three vessel run off to both lower extremities. Left lower extremity   edema without subcutaneous emphysema.    Indeterminate left adrenal gland lesion measuring 3.0 cm. This can be   further evaluated with dedicated MRI adrenal gland protocol.                  KRISTINA ARDON M.D., ATTENDING RADIOLOGIST  This document has been electronically signed. Nov 14 2018 10:58AM                  < end of copied text >  Daily     Daily     Physical Exam:  General: No apparent distress  HEENT: no oral ulcers, no cervical lymphadenopathy   CVS: +S1/S2, RRR  Resp: CTA b/l, no w, c  GI: Soft, NT/ND  MSK: L foot swollen up to ankle. Past ankle, foot is cool. There is dry skin.   Neuro: AAOx3  Skin: slight erythema of medial malleolus     LABS:                        10.5   19.23 )-----------( 407      ( 16 Nov 2018 05:36 )             33.1     11-16    137  |  98  |  7   ----------------------------<  128<H>  3.6   |  26  |  0.70    Ca    10.0      16 Nov 2018 05:35  Phos  2.7     11-16  Mg     1.6     11-16      PTT - ( 16 Nov 2018 05:35 )  PTT:81.0 SEC      RADIOLOGY & ADDITIONAL STUDIES:

## 2018-11-16 NOTE — CONSULT NOTE ADULT - ASSESSMENT
68F with DM, HTN, weight loss, admitted on 11/14/18 with left foot pain of several months duration. Evaluation did not reveal arterial insufficiency. Despite fever and leukocytosis, she appears markedly non toxic.    fever  leukocytosis  left adrenal nodule  monoarticular arthritis left ankle    Antibiotics  Cefazolin 11/13  Zosyn  11/14  Vanco 11/14 -->    Differential diagnosis included crystal arthropathy and septic arthritis.    Suggest:  Consider Rheumatology evaluation, ankle aspiration (patient currently on Heparin infusion.  Discontinue Vanco  resume Cefazolin  check ProCalcitonin, Uric acid level, monitor culture results    Discussed with PA  I will be away until 11/23 -- ID service to follow this weekend

## 2018-11-16 NOTE — PROGRESS NOTE ADULT - SUBJECTIVE AND OBJECTIVE BOX
CHIEF COMPLAINT:Patient is a 68y old  Female who presents with a chief complaint of Left foot cellulitis (14 Nov 2018 18:53)    	  Interval history: no acute events      Allergies:  No Known Allergies      PAST MEDICAL & SURGICAL HISTORY:  DM (diabetes mellitus)  HTN (hypertension)  Breast CA  No significant past surgical history      FAMILY HISTORY:  No pertinent family history in first degree relatives      REVIEW OF SYSTEMS:  CONSTITUTIONAL: No fever, weight loss, or fatigue  EYES: No eye pain, visual disturbances, or discharge  NECK: No pain or stiffness  RESPIRATORY: No cough or wheezing, no shortness of breath  CARDIOVASCULAR: No chest pain, palpitations, dizziness, or leg swelling  GASTROINTESTINAL: No abdominal or epigastric pain. No nausea, vomiting, diarrhea or constipation  GENITOURINARY: No dysuria, urinary frequency or urgency, no hematuria  NEUROLOGICAL: No headaches, memory loss, loss of strength, numbness, or tremors  SKIN: No itching, burning, rashes, or lesions   MUSCULOSKELETAL: No joint pain or swelling; No muscle, back, or extremity pain      Medications:  MEDICATIONS  (STANDING):  dextrose 5% + sodium chloride 0.45%. 1000 milliLiter(s) (75 mL/Hr) IV Continuous <Continuous>  dextrose 5%. 1000 milliLiter(s) (50 mL/Hr) IV Continuous <Continuous>  dextrose 50% Injectable 12.5 Gram(s) IV Push once  heparin  Infusion 1500 Unit(s)/Hr (17 mL/Hr) IV Continuous <Continuous>  insulin lispro (HumaLOG) corrective regimen sliding scale   SubCutaneous three times a day before meals  lisinopril 10 milliGRAM(s) Oral daily  vancomycin  IVPB 1000 milliGRAM(s) IV Intermittent every 12 hours    MEDICATIONS  (PRN):  acetaminophen   Tablet .. 975 milliGRAM(s) Oral every 6 hours PRN Mild Pain (1 - 3)  dextrose 40% Gel 15 Gram(s) Oral once PRN Blood Glucose LESS THAN 70 milliGRAM(s)/deciliter  glucagon  Injectable 1 milliGRAM(s) IntraMuscular once PRN Glucose LESS THAN 70 milligrams/deciliter  oxyCODONE    IR 5 milliGRAM(s) Oral every 6 hours PRN Moderate Pain (4 - 6)    	    PHYSICAL EXAM:  T(C): 37.4 (11-16-18 @ 05:44), Max: 38.2 (11-15-18 @ 22:36)  HR: 76 (11-16-18 @ 05:44) (76 - 113)  BP: 144/91 (11-16-18 @ 05:44) (119/75 - 151/97)  RR: 17 (11-16-18 @ 05:44) (16 - 18)  SpO2: 98% (11-16-18 @ 05:44) (96% - 100%)  Wt(kg): --  I&O's Summary    15 Nov 2018 07:01  -  16 Nov 2018 07:00  --------------------------------------------------------  IN: 1354 mL / OUT: 0 mL / NET: 1354 mL      Appearance: Normal	  HEENT:   NCAT, PERRL, EOMI	  Lymphatic: No lymphadenopathy  Cardiovascular: Normal S1 S2, RRR  Respiratory: Lungs clear to auscultation BL  Psychiatry: A & O x 3, Mood & affect appropriate  Gastrointestinal:  Soft, Non-tender, + BS  Skin: No rashes, No ecchymoses, No cyanosis	  Neurologic: Non-focal  Extremities: Normal range of motion, No clubbing, cyanosis or edema    LABS:	 	    CARDIAC MARKERS:                                10.5   19.23 )-----------( 407      ( 16 Nov 2018 05:36 )             33.1     11-16    137  |  98  |  7   ----------------------------<  128<H>  3.6   |  26  |  0.70    Ca    10.0      16 Nov 2018 05:35  Phos  2.7     11-16  Mg     1.6     11-16      proBNP:   Lipid Profile:   HgA1c:   TSH:

## 2018-11-16 NOTE — CONSULT NOTE ADULT - SUBJECTIVE AND OBJECTIVE BOX
Patient is a 68y old  Female who presents with a chief complaint of Left foot cellulitis (16 Nov 2018 11:02)    HPI:  67 yo woman with a hx of DM and HTN presents for evaluation of left foot pain, swelling and redness x1 month. The patient saw Dr. Lema in clinic today and was advised to present to ED for evaluation. The pain is only minimally alleviated by OTC pain medications. She reports fevers at home, denies chills, N/V, or drainage from the left foot. The patient is minimally ambulatory w/ cane at home.    ED Course: Patient febrile to 101.3, tachycardic to 120s, improved after IV fluid administration. ED noted streaking up left leg, begun on broad-spectrum abx and cultures sent. (14 Nov 2018 00:22)  Patient interjects medical history with her frustrations about process of care and continued symptoms. She describes onset of illness in July 2018. She is unclear about initial symptoms. She states her PCP referred her to cardiologist for ankle swelling and that an echocardiogram was reportedly normal. She has had weight loss of about 30# which was partially intentional. She had bilateral ankle pain - but her right foot pain appears unchanging - she states from diabetic neuropathy. She has had pain in left ankle and foot that appears unrelieved by different therapies. She has had difficulty walking on left foot - unclear for what duration.  No dental pain.  On admission, she had what was described as painful cold left foot. On 11/14/18, CTA of aortal and LE vessels demonstrated no arterial stenosis. A left adrenal nodule 3.0 x 2.6 cm was an incidental finding.  She has been treated with antibiotics. She has continued pain in left foot and ankle with marked tenderness to light palpation. Her active range of left ankle flexion is markedly restricted.      PAST MEDICAL & SURGICAL HISTORY:  DM (diabetes mellitus)  HTN (hypertension)  Breast CA - lumpectomy 1993      Social history: current smoker - no EtOH    FAMILY HISTORY:  Brother has gout and diabetes    REVIEW OF SYSTEMS:  CONSTITUTIONAL: No weakness, +fevers no chills  EYES/ENT: No visual changes;  No vertigo; scratchy throat   NECK: No pain or stiffness  RESPIRATORY: No cough, wheezing, hemoptysis; No shortness of breath  CARDIOVASCULAR: No chest pain or palpitations  GASTROINTESTINAL: No abdominal or epigastric pain. No nausea, vomiting, or hematemesis; No diarrhea or constipation. No melena or hematochezia.  GENITOURINARY: No dysuria, frequency or hematuria  NEUROLOGICAL: No numbness or weakness  SKIN: No itching, burning, rashes, or lesions   All other review of systems is negative unless indicated above    Allergies  No Known Allergies    Antimicrobials:  vancomycin  IVPB 1000 milliGRAM(s) IV Intermittent every 12 hours      Vital Signs Last 24 Hrs  T(C): 37.4 (16 Nov 2018 05:44), Max: 38.2 (15 Nov 2018 22:36)  T(F): 99.4 (16 Nov 2018 05:44), Max: 100.7 (15 Nov 2018 22:36)  HR: 76 (16 Nov 2018 05:44) (76 - 113)  BP: 144/91 (16 Nov 2018 05:44) (119/75 - 151/97)  BP(mean): --  RR: 17 (16 Nov 2018 05:44) (16 - 18)  SpO2: 98% (16 Nov 2018 05:44) (96% - 100%)    PHYSICAL EXAM:  General: WN/WD NAD, Non-toxic  teeth in poor repair  Neurology: A&Ox3, nonfocal  Respiratory: Clear to auscultation bilaterally  CV: RRR, S1S2, no murmurs, rubs or gallops  Abdominal: Soft, Non-tender, non-distended, normal bowel sounds  Extremities: left ankle edema, dark discoloration with superficial scaling left lat ankle and dorsum of foot  Line Sites: Clear  Skin: No rash                          10.5   19.23 )-----------( 407      ( 16 Nov 2018 05:36 )             33.1       11-16    137  |  98  |  7   ----------------------------<  128<H>  3.6   |  26  |  0.70    Ca    10.0      16 Nov 2018 05:35  Phos  2.7     11-16  Mg     1.6     11-16      MICROBIOLOGY:  BLOOD  11-14-18 --  --  --    Radiology: images viewed  < from: CT Angio Abd Aorta w/run-off w/ IV Cont (11.14.18 @ 09:03) >  Normal caliber and patent abdominal aorta.    Three vessel run off to both lower extremities. Left lower extremity   edema without subcutaneous emphysema.    Indeterminate left adrenal gland lesion measuring 3.0 cm. This can be   further evaluated with dedicated MRI adrenal gland protocol.    < end of copied text >  < from: Xray Ankle 2 Views, Left (11.14.18 @ 01:24) >  There is no plain film radiographic evidence of osteomyelitis.    < end of copied text >      Dayne Briseno MD; Division of Infectious Disease; Pager: [phone]; nights and weekends: [phone]

## 2018-11-16 NOTE — CONSULT NOTE ADULT - ASSESSMENT
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   APLS in differential given w complex deposition in intima; however no thrombi on bx   FMD in differential, given histopathology of FMD (10 of PMD) has shown collagen deposition in intima. There is also case reports of distal vasculature involvement.   Given endarteritis obliterans, syphilis should be in differential   Pt does have signficiant smoking hx, which raises the question if this is Burgers', however, not thrombi on bx  Challenging diagnosis    Recommend  Please obtain APLS serology ([id number], anticardiolipin); pt on heparin, so cannot obtain LA  Would consider traditional angiography or MRA w distal visualization of LLE  Pleas obtain syphilis serology   Please obtain CRP, ESR  Would continue heparin gtt until work up above is obtained  Would consider derm eval to assess if pt benefits from repeat skin bx with superficial and deep sections, with immunostaining    Pal Cardenas MD PGY4  [id number] 69 y/o F presenting w several months of L ankle/foot swelling, w recurrent low grade fevers  On outpat bx results, 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   APLS in differential given w complex deposition in intima; however no thrombi on bx   FMD in differential, given histopathology of FMD (10 of PMD) has shown collagen deposition in intima. There is also case reports of distal vasculature involvement.   Given endarteritis obliterans, syphilis should be in differential   Pt does have signficiant smoking hx, which raises the question if this is Burgers', however, not thrombi on bx  Of note, do not see a venous doppler that was made  Challenging diagnosis    Recommend  Please obtain APLS serology ([id number], anticardiolipin); pt on heparin, so cannot obtain LA  Would consider traditional angiography or MRA w distal visualization of LLE  Pleas obtain syphilis serology   Please obtain CRP, ESR  Would obtain LLE venous doppler  Would continue heparin gtt until work up above is obtained  Would consider derm eval to assess if pt benefits from repeat skin bx with superficial and deep sections, with immunostaining    Pal Cardenas MD PGY4  [id number] 69 y/o F presenting w several months of L ankle/foot swelling, w recurrent low grade fevers  On outpat bx results, 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   APLS in differential given w complex deposition in intima; however no thrombi on bx   FMD in differential, given histopathology of FMD (10% of FMD is seen in LE) has shown collagen deposition in intima. There is also case reports of distal vasculature involvement.   Given endarteritis obliterans, syphilis should be in differential   Pt does have significant smoking hx, which raises the question if this is Burgers', however, not thrombi on bx  Challenging diagnosis    Recommend  Please obtain APLS serology ([id number], anticardiolipin); pt on heparin, so cannot obtain LA  Would consider traditional angiography or MRA w distal visualization of LLE  Pleas obtain syphilis serology   Please obtain CRP, ESR  Would obtain LLE venous doppler  Would continue heparin gtt until work up above is obtained  Would consider derm eval to assess if pt benefits from repeat skin bx with superficial and deep sections, with immunostaining    Pal Cardenas MD PGY4  [id number]

## 2018-11-16 NOTE — PROGRESS NOTE ADULT - SUBJECTIVE AND OBJECTIVE BOX
SURGICAL ONCOLOGY NOTE    Surgical oncology following patient and awaiting MRI. Will continue to follow with you to assess left adrenal nodule.  Please page [phone] with any questions or when MRI is performed.

## 2018-11-16 NOTE — PROGRESS NOTE ADULT - SUBJECTIVE AND OBJECTIVE BOX
Pain decreased  L LE: foot/toes isch changes stable.  No gross infection  +PT pulse  Rec:   Cont multispec fu/mgmt.

## 2018-11-16 NOTE — CHART NOTE - NSCHARTNOTEFT_GEN_A_CORE
emailed heme consult as per vascular for hypercoagulable workup. pt currently on heparin. will f/u recs

## 2018-11-16 NOTE — CHART NOTE - NSCHARTNOTEFT_GEN_A_CORE
Patient is a 68y old  Female who presents with a chief complaint of Left foot cellulitis (16 Nov 2018 14:46)      ID consulted- Differential diagnosis included crystal arthropathy and septic arthritis. Consider Rheumatology evaluation, ankle aspiration.    Rheum consult emailed- f/u recs

## 2018-11-16 NOTE — PROGRESS NOTE ADULT - ASSESSMENT
69 yo F w pmhx of T2DM, HTN presenting with 1 month of LLE swelling and discoloration admitted for cellulitis      Problem/Recommendation - 1:  Problem: Cellulitis of foot, left. Recommendation: - Pt meeting sepsis criteria on admission; sepsis now resolved  - C/w vancomycin. Can d/c zosyn   - f/u blood cultures, if negative, downgrade antibiotics to Keflex  - Appreciate podiatry consult  - tylenol PRN for fever     Problem/Recommendation - 2:  ·  Problem: HTN (hypertension).  Recommendation: BP well controlled  c/w home med of lisinopril.      Problem/Recommendation - 3:  ·  Problem: DM (diabetes mellitus).  Recommendation: Blood glucose well controlled  A1C 6.3  C/w sliding scale insulin.      Problem/Recommendation - 4:  ·  Problem: Microcytic anemia.  Recommendation: Will workup with iron studies in am  Confirm pt has had outpt screening colonoscopy.      Problem/Recommendation - 5:  ·  Problem: Ischemia of LLE - c/w Heparin gtt for now, vasc to comment on need to continue AC long term, ? switch to NOAC    Adrenal incidenteloma - await MRI, surg appreciated

## 2018-11-17 LAB
APTT BLD: 72.1 SEC — HIGH (ref 27.5–36.3)
BUN SERPL-MCNC: 8 MG/DL — SIGNIFICANT CHANGE UP (ref 7–23)
CALCIUM SERPL-MCNC: 9.6 MG/DL — SIGNIFICANT CHANGE UP (ref 8.4–10.5)
CHLORIDE SERPL-SCNC: 97 MMOL/L — LOW (ref 98–107)
CO2 SERPL-SCNC: 26 MMOL/L — SIGNIFICANT CHANGE UP (ref 22–31)
CREAT SERPL-MCNC: 0.86 MG/DL — SIGNIFICANT CHANGE UP (ref 0.5–1.3)
CRP SERPL-MCNC: 197.7 MG/L — HIGH
ERYTHROCYTE [SEDIMENTATION RATE] IN BLOOD: 85 MM/HR — HIGH (ref 4–25)
GLUCOSE BLDC GLUCOMTR-MCNC: 123 MG/DL — HIGH (ref 70–99)
GLUCOSE BLDC GLUCOMTR-MCNC: 129 MG/DL — HIGH (ref 70–99)
GLUCOSE BLDC GLUCOMTR-MCNC: 141 MG/DL — HIGH (ref 70–99)
GLUCOSE BLDC GLUCOMTR-MCNC: 153 MG/DL — HIGH (ref 70–99)
GLUCOSE SERPL-MCNC: 131 MG/DL — HIGH (ref 70–99)
HAV IGM SER-ACNC: NONREACTIVE — SIGNIFICANT CHANGE UP
HBV CORE AB SER-ACNC: NONREACTIVE — SIGNIFICANT CHANGE UP
HBV CORE IGM SER-ACNC: NONREACTIVE — SIGNIFICANT CHANGE UP
HBV SURFACE AG SER-ACNC: NONREACTIVE — SIGNIFICANT CHANGE UP
HCT VFR BLD CALC: 30.5 % — LOW (ref 34.5–45)
HCV AB S/CO SERPL IA: 0.06 S/CO — SIGNIFICANT CHANGE UP
HCV AB SERPL-IMP: SIGNIFICANT CHANGE UP
HGB BLD-MCNC: 9.5 G/DL — LOW (ref 11.5–15.5)
INR BLD: 1.23 — HIGH (ref 0.88–1.17)
INR BLD: 1.23 — HIGH (ref 0.88–1.17)
MCHC RBC-ENTMCNC: 24.2 PG — LOW (ref 27–34)
MCHC RBC-ENTMCNC: 31.1 % — LOW (ref 32–36)
MCV RBC AUTO: 77.6 FL — LOW (ref 80–100)
NRBC # FLD: 0 — SIGNIFICANT CHANGE UP
PLATELET # BLD AUTO: 431 K/UL — HIGH (ref 150–400)
PMV BLD: 10.5 FL — SIGNIFICANT CHANGE UP (ref 7–13)
POTASSIUM SERPL-MCNC: 3.3 MMOL/L — LOW (ref 3.5–5.3)
POTASSIUM SERPL-SCNC: 3.3 MMOL/L — LOW (ref 3.5–5.3)
PROCALCITONIN SERPL-MCNC: 0.11 NG/ML — HIGH (ref 0.02–0.1)
PROTHROM AB SERPL-ACNC: 14.1 SEC — HIGH (ref 9.8–13.1)
PROTHROM AB SERPL-ACNC: 14.1 SEC — HIGH (ref 9.8–13.1)
RBC # BLD: 3.93 M/UL — SIGNIFICANT CHANGE UP (ref 3.8–5.2)
RBC # FLD: 17 % — HIGH (ref 10.3–14.5)
SODIUM SERPL-SCNC: 137 MMOL/L — SIGNIFICANT CHANGE UP (ref 135–145)
T PALLIDUM AB TITR SER: NEGATIVE — SIGNIFICANT CHANGE UP
THROMBIN TIME: 164.3 SEC — HIGH (ref 17–26)
URATE SERPL-MCNC: 4.9 MG/DL — SIGNIFICANT CHANGE UP (ref 2.5–7)
WBC # BLD: 20.41 K/UL — HIGH (ref 3.8–10.5)
WBC # FLD AUTO: 20.41 K/UL — HIGH (ref 3.8–10.5)

## 2018-11-17 PROCEDURE — 99223 1ST HOSP IP/OBS HIGH 75: CPT | Mod: GC

## 2018-11-17 RX ORDER — POTASSIUM CHLORIDE 20 MEQ
40 PACKET (EA) ORAL ONCE
Qty: 0 | Refills: 0 | Status: COMPLETED | OUTPATIENT
Start: 2018-11-17 | End: 2018-11-17

## 2018-11-17 RX ADMIN — SODIUM CHLORIDE 75 MILLILITER(S): 9 INJECTION, SOLUTION INTRAVENOUS at 21:51

## 2018-11-17 RX ADMIN — Medication 100 MILLIGRAM(S): at 05:49

## 2018-11-17 RX ADMIN — Medication 40 MILLIEQUIVALENT(S): at 12:02

## 2018-11-17 RX ADMIN — SODIUM CHLORIDE 75 MILLILITER(S): 9 INJECTION, SOLUTION INTRAVENOUS at 05:50

## 2018-11-17 RX ADMIN — HEPARIN SODIUM 17 UNIT(S)/HR: 5000 INJECTION INTRAVENOUS; SUBCUTANEOUS at 09:30

## 2018-11-17 RX ADMIN — Medication 100 MILLIGRAM(S): at 14:18

## 2018-11-17 RX ADMIN — LISINOPRIL 10 MILLIGRAM(S): 2.5 TABLET ORAL at 05:49

## 2018-11-17 RX ADMIN — Medication 100 MILLIGRAM(S): at 21:51

## 2018-11-17 NOTE — PROGRESS NOTE ADULT - ASSESSMENT
69 yo F w pmhx of T2DM, HTN presenting with 1 month of LLE swelling and discoloration admitted for cellulitis      Problem/Recommendation - 1:  Problem: Cellulitis of foot, left. Recommendation: - Pt meeting sepsis criteria on admission; sepsis now resolved  - C/w vancomycin. Can d/c zosyn   - f/u blood cultures, if negative, downgrade antibiotics to Keflex  - Appreciate podiatry consult  - tylenol PRN for fever     Problem/Recommendation - 2:  ·  Problem: HTN (hypertension).  Recommendation: BP well controlled  c/w home med of lisinopril.      Problem/Recommendation - 3:  ·  Problem: DM (diabetes mellitus).  Recommendation: Blood glucose well controlled  A1C 6.3  C/w sliding scale insulin.      Problem/Recommendation - 4:  ·  Problem: Microcytic anemia.  Recommendation: Will workup with iron studies in am  Confirm pt has had outpt screening colonoscopy.      Problem/Recommendation - 5:  ·  Problem: Ischemia of LLE - c/w Heparin gtt for now, vasc to comment on need to continue AC long term, ? switch to NOAC  Rheum and Heme appreciated, await vasculitis w/u    Adrenal incidenteloma - await MRI, surg appreciated

## 2018-11-17 NOTE — CHART NOTE - NSCHARTNOTEFT_GEN_A_CORE
Spoke to vascular surgery and will weigh in on AC (Continuation of heparin gtt or transition to other AC). Awaiting recs. MRA placed per rheum recs and will consult derm tomorrow for possible repeat biopsy on Monday.   -Hebert PHAN NP Service PGY4 [facility] Pager#[phone]

## 2018-11-17 NOTE — PROGRESS NOTE ADULT - SUBJECTIVE AND OBJECTIVE BOX
General Surgery Progress Note    SUBJECTIVE:  The patient was seen and examined. No acute events overnight.   pain well controlled.    OBJECTIVE:     ** VITAL SIGNS / I&O's **    Vital Signs Last 24 Hrs  T(C): 37.3 (17 Nov 2018 05:34), Max: 37.7 (16 Nov 2018 22:16)  T(F): 99.2 (17 Nov 2018 05:34), Max: 99.9 (16 Nov 2018 22:16)  HR: 70 (17 Nov 2018 05:34) (70 - 105)  BP: 134/89 (17 Nov 2018 05:34) (126/70 - 143/76)  BP(mean): --  RR: 18 (17 Nov 2018 05:34) (18 - 18)  SpO2: 98% (17 Nov 2018 05:34) (95% - 98%)        ** PHYSICAL EXAM **    -- CONSTITUTIONAL: Alert, NAD  -- PULMONARY: non-labored respirations  -- EXTR/VASC: L LE: foot/toes isch changes stable.  No gross infection. +PT pulse of LLE.    ** LABS **                          9.5    20.41 )-----------( 431      ( 17 Nov 2018 06:45 )             30.5     17 Nov 2018 06:45    137    |  97     |  8      ----------------------------<  131    3.3     |  26     |  0.86     Ca    9.6        17 Nov 2018 06:45  Phos  2.7       16 Nov 2018 05:35  Mg     1.6       16 Nov 2018 05:35      PT/INR - ( 17 Nov 2018 06:45 )   PT: 14.1 SEC;   INR: 1.23          PTT - ( 17 Nov 2018 06:45 )  PTT:72.1 SEC  CAPILLARY BLOOD GLUCOSE      POCT Blood Glucose.: 141 mg/dL (17 Nov 2018 08:15)  POCT Blood Glucose.: 173 mg/dL (16 Nov 2018 21:59)  POCT Blood Glucose.: 172 mg/dL (16 Nov 2018 16:37)  POCT Blood Glucose.: 127 mg/dL (16 Nov 2018 12:32)                MEDICATIONS  (STANDING):  ceFAZolin   IVPB 1000 milliGRAM(s) IV Intermittent every 8 hours  dextrose 5% + sodium chloride 0.45%. 1000 milliLiter(s) (75 mL/Hr) IV Continuous <Continuous>  dextrose 5%. 1000 milliLiter(s) (50 mL/Hr) IV Continuous <Continuous>  dextrose 50% Injectable 12.5 Gram(s) IV Push once  heparin  Infusion 1500 Unit(s)/Hr (17 mL/Hr) IV Continuous <Continuous>  insulin lispro (HumaLOG) corrective regimen sliding scale   SubCutaneous three times a day before meals  lisinopril 10 milliGRAM(s) Oral daily    MEDICATIONS  (PRN):  acetaminophen   Tablet .. 975 milliGRAM(s) Oral every 6 hours PRN Mild Pain (1 - 3)  dextrose 40% Gel 15 Gram(s) Oral once PRN Blood Glucose LESS THAN 70 milliGRAM(s)/deciliter  glucagon  Injectable 1 milliGRAM(s) IntraMuscular once PRN Glucose LESS THAN 70 milligrams/deciliter  oxyCODONE    IR 5 milliGRAM(s) Oral every 6 hours PRN Moderate Pain (4 - 6)

## 2018-11-17 NOTE — PROGRESS NOTE ADULT - ASSESSMENT
68 woman with a hx of DM, HTN and breast cancer s/p lumpectomy admitted for cellulitis. on medicine service    PLAN:  -cont care per primary team  -Cont multispecialty management.

## 2018-11-17 NOTE — CONSULT NOTE ADULT - ASSESSMENT
68 woman with a hx of DM, HTN and breast cancer s/p lumpectomy admitted for cellulitis. Heme consulted for ?hypercoagulable workup    # Hx breast cancer  - Not a [facility] patient       # Hypercoagulable workup   - Per vascular, concern for underlying PVD   - PT/INR/PTT all normal on admission 68 woman with a hx of DM, HTN and remote hx of breast cancer s/p lumpectomy and chemoradiation admitted for cellulitis. Heme consulted for hypercoagulable workup    # LE swelling and discoloration  - Per vascular, concern for underlying PVD   - Coags within normal limits on admission  - Seen by rheum who believes Ddx: vasculitis vs APLS  - APLS labs pending  - Given irregular rhythm and discoloration, concern for embolic clot in LE  - Follow vascular recs    Case seen and discussed with Dr. Singh

## 2018-11-17 NOTE — CONSULT NOTE ADULT - SUBJECTIVE AND OBJECTIVE BOX
HPI:  Patient is a 68 woman with a hx of DM, HTN and breast cancer s/p lumpectomy who initially presented for evaluation of left foot pain, swelling and redness x1 month. Patient was found to meet sepsis criteria upon arrival with fever and tachycardia for which she was started on broad spectrum abx for cellulitis.        Allergies    No Known Allergies    Intolerances        MEDICATIONS  (STANDING):  ceFAZolin   IVPB 1000 milliGRAM(s) IV Intermittent every 8 hours  dextrose 5% + sodium chloride 0.45%. 1000 milliLiter(s) (75 mL/Hr) IV Continuous <Continuous>  dextrose 5%. 1000 milliLiter(s) (50 mL/Hr) IV Continuous <Continuous>  dextrose 50% Injectable 12.5 Gram(s) IV Push once  heparin  Infusion 1500 Unit(s)/Hr (17 mL/Hr) IV Continuous <Continuous>  insulin lispro (HumaLOG) corrective regimen sliding scale   SubCutaneous three times a day before meals  lisinopril 10 milliGRAM(s) Oral daily    MEDICATIONS  (PRN):  acetaminophen   Tablet .. 975 milliGRAM(s) Oral every 6 hours PRN Mild Pain (1 - 3)  dextrose 40% Gel 15 Gram(s) Oral once PRN Blood Glucose LESS THAN 70 milliGRAM(s)/deciliter  glucagon  Injectable 1 milliGRAM(s) IntraMuscular once PRN Glucose LESS THAN 70 milligrams/deciliter  oxyCODONE    IR 5 milliGRAM(s) Oral every 6 hours PRN Moderate Pain (4 - 6)      PAST MEDICAL & SURGICAL HISTORY:  DM (diabetes mellitus)  HTN (hypertension)  Breast CA  No significant past surgical history      FAMILY HISTORY:  No pertinent family history in first degree relatives      SOCIAL HISTORY: No EtOH, no tobacco    REVIEW OF SYSTEMS:    CONSTITUTIONAL: No weakness, fevers or chills  EYES/ENT: No visual changes;  No vertigo or throat pain   NECK: No pain or stiffness  RESPIRATORY: No cough, wheezing, hemoptysis; No shortness of breath  CARDIOVASCULAR: No chest pain or palpitations  GASTROINTESTINAL: No abdominal or epigastric pain. No nausea, vomiting, or hematemesis; No diarrhea or constipation. No melena or hematochezia.  GENITOURINARY: No dysuria, frequency or hematuria  NEUROLOGICAL: No numbness or weakness  SKIN: No itching, burning, rashes, or lesions   All other review of systems is negative unless indicated above.        T(F): 99.2 (11-17-18 @ 05:34), Max: 99.9 (11-16-18 @ 22:16)  HR: 70 (11-17-18 @ 05:34)  BP: 134/89 (11-17-18 @ 05:34)  RR: 18 (11-17-18 @ 05:34)  SpO2: 98% (11-17-18 @ 05:34)  Wt(kg): --    GENERAL: NAD, well-developed  HEAD:  Atraumatic, Normocephalic  EYES: EOMI, PERRLA, conjunctiva and sclera clear  NECK: Supple, No JVD  CHEST/LUNG: Clear to auscultation bilaterally; No wheeze  HEART: Regular rate and rhythm; No murmurs, rubs, or gallops  ABDOMEN: Soft, Nontender, Nondistended; Bowel sounds present  EXTREMITIES:  2+ Peripheral Pulses, No clubbing, cyanosis, or edema  NEUROLOGY: non-focal  SKIN: No rashes or lesions                          9.5    20.41 )-----------( 431      ( 17 Nov 2018 06:45 )             30.5       11-17    137  |  97<L>  |  8   ----------------------------<  131<H>  3.3<L>   |  26  |  0.86    Ca    9.6      17 Nov 2018 06:45  Phos  2.7     11-16  Mg     1.6     11-16        Uric Acid, Serum: 4.9 mg/dL (11-17 @ 06:45) HPI:  Patient is a 68 woman with a hx of DM, HTN and breast cancer s/p lumpectomy who initially presented for evaluation of left foot pain, swelling and redness x1 month. Patient was found to meet sepsis criteria upon arrival with fever and tachycardia for which she was started on broad spectrum abx for cellulitis. Patient was evaluated by vascular surgery who recommended hypercoagulable workup due to concern for PVD. SHe was also seen by Rheum who believe she might have an underlying vasculitis    Allergies    No Known Allergies    Intolerances        MEDICATIONS  (STANDING):  ceFAZolin   IVPB 1000 milliGRAM(s) IV Intermittent every 8 hours  dextrose 5% + sodium chloride 0.45%. 1000 milliLiter(s) (75 mL/Hr) IV Continuous <Continuous>  dextrose 5%. 1000 milliLiter(s) (50 mL/Hr) IV Continuous <Continuous>  dextrose 50% Injectable 12.5 Gram(s) IV Push once  heparin  Infusion 1500 Unit(s)/Hr (17 mL/Hr) IV Continuous <Continuous>  insulin lispro (HumaLOG) corrective regimen sliding scale   SubCutaneous three times a day before meals  lisinopril 10 milliGRAM(s) Oral daily    MEDICATIONS  (PRN):  acetaminophen   Tablet .. 975 milliGRAM(s) Oral every 6 hours PRN Mild Pain (1 - 3)  dextrose 40% Gel 15 Gram(s) Oral once PRN Blood Glucose LESS THAN 70 milliGRAM(s)/deciliter  glucagon  Injectable 1 milliGRAM(s) IntraMuscular once PRN Glucose LESS THAN 70 milligrams/deciliter  oxyCODONE    IR 5 milliGRAM(s) Oral every 6 hours PRN Moderate Pain (4 - 6)      PAST MEDICAL & SURGICAL HISTORY:  DM (diabetes mellitus)  HTN (hypertension)  Breast CA  No significant past surgical history      FAMILY HISTORY:  No pertinent family history in first degree relatives      SOCIAL HISTORY: No EtOH, no tobacco    REVIEW OF SYSTEMS: per HPI     T(F): 99.2 (11-17-18 @ 05:34), Max: 99.9 (11-16-18 @ 22:16)  HR: 70 (11-17-18 @ 05:34)  BP: 134/89 (11-17-18 @ 05:34)  RR: 18 (11-17-18 @ 05:34)  SpO2: 98% (11-17-18 @ 05:34)  Wt(kg): --    GENERAL: NAD, well-developed  HEAD:  Atraumatic, Normocephalic  EYES: EOMI,  conjunctiva and sclera clear  NECK: Supple,   CHEST/LUNG: Clear to auscultation bilaterally; No wheeze  HEART: irregularly, irregular. No murmurs, rubs, or gallops  ABDOMEN: Soft, Nontender, Nondistended; Bowel sounds present  EXTREMITIES:  left LE with discoloration, + edema in ankle and foot   NEUROLOGY: non-focal  SKIN: No rashes or lesions                          9.5    20.41 )-----------( 431      ( 17 Nov 2018 06:45 )             30.5       11-17    137  |  97<L>  |  8   ----------------------------<  131<H>  3.3<L>   |  26  |  0.86    Ca    9.6      17 Nov 2018 06:45  Phos  2.7     11-16  Mg     1.6     11-16        Uric Acid, Serum: 4.9 mg/dL (11-17 @ 06:45)

## 2018-11-17 NOTE — CONSULT NOTE ADULT - ATTENDING COMMENTS
Pt seen, examined and d/w fellow. agree with above A/P. History as noted above of admission for presumed cellulitis / sepsis. The sepsis has resolved. Cx neg to date. Exam is remarkable for irreg irreg heart rhythm and residual patchy dusky  skin changes over LLE with associated coolness to touch .  Agree with fellow 's A/P. Cont anticoagg for poss thrombotic disorder pending further eval by vascular and await results of hypercoagg and vasculitis work up. Will follow with you.
Agree with abvoe. Patient admitted for concern for cold foot, CTA negative.  Continue treamtnet for cellulitis. if not improving, can consider mononeuritis multiplex and rheumatologic cause.  Rest as above.

## 2018-11-18 LAB
ALBUMIN SERPL ELPH-MCNC: 2.6 G/DL — LOW (ref 3.3–5)
ALP SERPL-CCNC: 74 U/L — SIGNIFICANT CHANGE UP (ref 40–120)
ALT FLD-CCNC: 12 U/L — SIGNIFICANT CHANGE UP (ref 4–33)
APTT BLD: 76 SEC — HIGH (ref 27.5–36.3)
AST SERPL-CCNC: 14 U/L — SIGNIFICANT CHANGE UP (ref 4–32)
BASOPHILS # BLD AUTO: 0.07 K/UL — SIGNIFICANT CHANGE UP (ref 0–0.2)
BASOPHILS NFR BLD AUTO: 0.3 % — SIGNIFICANT CHANGE UP (ref 0–2)
BILIRUB SERPL-MCNC: 0.2 MG/DL — SIGNIFICANT CHANGE UP (ref 0.2–1.2)
BUN SERPL-MCNC: 8 MG/DL — SIGNIFICANT CHANGE UP (ref 7–23)
BUN SERPL-MCNC: 8 MG/DL — SIGNIFICANT CHANGE UP (ref 7–23)
CALCIUM SERPL-MCNC: 9.9 MG/DL — SIGNIFICANT CHANGE UP (ref 8.4–10.5)
CALCIUM SERPL-MCNC: 9.9 MG/DL — SIGNIFICANT CHANGE UP (ref 8.4–10.5)
CHLORIDE SERPL-SCNC: 97 MMOL/L — LOW (ref 98–107)
CHLORIDE SERPL-SCNC: 97 MMOL/L — LOW (ref 98–107)
CO2 SERPL-SCNC: 26 MMOL/L — SIGNIFICANT CHANGE UP (ref 22–31)
CO2 SERPL-SCNC: 26 MMOL/L — SIGNIFICANT CHANGE UP (ref 22–31)
CREAT SERPL-MCNC: 0.84 MG/DL — SIGNIFICANT CHANGE UP (ref 0.5–1.3)
CREAT SERPL-MCNC: 0.84 MG/DL — SIGNIFICANT CHANGE UP (ref 0.5–1.3)
EOSINOPHIL # BLD AUTO: 0.15 K/UL — SIGNIFICANT CHANGE UP (ref 0–0.5)
EOSINOPHIL NFR BLD AUTO: 0.7 % — SIGNIFICANT CHANGE UP (ref 0–6)
GLUCOSE BLDC GLUCOMTR-MCNC: 139 MG/DL — HIGH (ref 70–99)
GLUCOSE BLDC GLUCOMTR-MCNC: 159 MG/DL — HIGH (ref 70–99)
GLUCOSE BLDC GLUCOMTR-MCNC: 165 MG/DL — HIGH (ref 70–99)
GLUCOSE BLDC GLUCOMTR-MCNC: 173 MG/DL — HIGH (ref 70–99)
GLUCOSE SERPL-MCNC: 140 MG/DL — HIGH (ref 70–99)
GLUCOSE SERPL-MCNC: 140 MG/DL — HIGH (ref 70–99)
HCT VFR BLD CALC: 28.6 % — LOW (ref 34.5–45)
HCT VFR BLD CALC: 28.6 % — LOW (ref 34.5–45)
HGB BLD-MCNC: 9.3 G/DL — LOW (ref 11.5–15.5)
HGB BLD-MCNC: 9.3 G/DL — LOW (ref 11.5–15.5)
IMM GRANULOCYTES # BLD AUTO: 0.21 # — SIGNIFICANT CHANGE UP
IMM GRANULOCYTES NFR BLD AUTO: 0.9 % — SIGNIFICANT CHANGE UP (ref 0–1.5)
INR BLD: 1.32 — HIGH (ref 0.88–1.17)
LYMPHOCYTES # BLD AUTO: 15.3 % — SIGNIFICANT CHANGE UP (ref 13–44)
LYMPHOCYTES # BLD AUTO: 3.39 K/UL — HIGH (ref 1–3.3)
MAGNESIUM SERPL-MCNC: 1.6 MG/DL — SIGNIFICANT CHANGE UP (ref 1.6–2.6)
MCHC RBC-ENTMCNC: 25.1 PG — LOW (ref 27–34)
MCHC RBC-ENTMCNC: 25.1 PG — LOW (ref 27–34)
MCHC RBC-ENTMCNC: 32.5 % — SIGNIFICANT CHANGE UP (ref 32–36)
MCHC RBC-ENTMCNC: 32.5 % — SIGNIFICANT CHANGE UP (ref 32–36)
MCV RBC AUTO: 77.1 FL — LOW (ref 80–100)
MCV RBC AUTO: 77.1 FL — LOW (ref 80–100)
MONOCYTES # BLD AUTO: 1.87 K/UL — HIGH (ref 0–0.9)
MONOCYTES NFR BLD AUTO: 8.5 % — SIGNIFICANT CHANGE UP (ref 2–14)
NEUTROPHILS # BLD AUTO: 16.43 K/UL — HIGH (ref 1.8–7.4)
NEUTROPHILS NFR BLD AUTO: 74.3 % — SIGNIFICANT CHANGE UP (ref 43–77)
NRBC # FLD: 0 — SIGNIFICANT CHANGE UP
NRBC # FLD: 0 — SIGNIFICANT CHANGE UP
PHOSPHATE SERPL-MCNC: 2.5 MG/DL — SIGNIFICANT CHANGE UP (ref 2.5–4.5)
PLATELET # BLD AUTO: 418 K/UL — HIGH (ref 150–400)
PLATELET # BLD AUTO: 418 K/UL — HIGH (ref 150–400)
PMV BLD: 10.5 FL — SIGNIFICANT CHANGE UP (ref 7–13)
PMV BLD: 10.5 FL — SIGNIFICANT CHANGE UP (ref 7–13)
POTASSIUM SERPL-MCNC: 3.4 MMOL/L — LOW (ref 3.5–5.3)
POTASSIUM SERPL-MCNC: 3.4 MMOL/L — LOW (ref 3.5–5.3)
POTASSIUM SERPL-SCNC: 3.4 MMOL/L — LOW (ref 3.5–5.3)
POTASSIUM SERPL-SCNC: 3.4 MMOL/L — LOW (ref 3.5–5.3)
PROT SERPL-MCNC: 6.5 G/DL — SIGNIFICANT CHANGE UP (ref 6–8.3)
PROTHROM AB SERPL-ACNC: 14.8 SEC — HIGH (ref 9.8–13.1)
RBC # BLD: 3.71 M/UL — LOW (ref 3.8–5.2)
RBC # BLD: 3.71 M/UL — LOW (ref 3.8–5.2)
RBC # FLD: 17.2 % — HIGH (ref 10.3–14.5)
RBC # FLD: 17.2 % — HIGH (ref 10.3–14.5)
SODIUM SERPL-SCNC: 134 MMOL/L — LOW (ref 135–145)
SODIUM SERPL-SCNC: 134 MMOL/L — LOW (ref 135–145)
WBC # BLD: 22.12 K/UL — HIGH (ref 3.8–10.5)
WBC # BLD: 22.12 K/UL — HIGH (ref 3.8–10.5)
WBC # FLD AUTO: 22.12 K/UL — HIGH (ref 3.8–10.5)
WBC # FLD AUTO: 22.12 K/UL — HIGH (ref 3.8–10.5)

## 2018-11-18 PROCEDURE — 99232 SBSQ HOSP IP/OBS MODERATE 35: CPT

## 2018-11-18 RX ORDER — POTASSIUM CHLORIDE 20 MEQ
40 PACKET (EA) ORAL ONCE
Qty: 0 | Refills: 0 | Status: COMPLETED | OUTPATIENT
Start: 2018-11-18 | End: 2018-11-18

## 2018-11-18 RX ORDER — IBUPROFEN 200 MG
400 TABLET ORAL ONCE
Qty: 0 | Refills: 0 | Status: COMPLETED | OUTPATIENT
Start: 2018-11-18 | End: 2018-11-18

## 2018-11-18 RX ADMIN — Medication 40 MILLIEQUIVALENT(S): at 13:10

## 2018-11-18 RX ADMIN — Medication 1: at 12:26

## 2018-11-18 RX ADMIN — HEPARIN SODIUM 17 UNIT(S)/HR: 5000 INJECTION INTRAVENOUS; SUBCUTANEOUS at 09:05

## 2018-11-18 RX ADMIN — Medication 100 MILLIGRAM(S): at 05:56

## 2018-11-18 RX ADMIN — Medication 100 MILLIGRAM(S): at 15:09

## 2018-11-18 RX ADMIN — OXYCODONE HYDROCHLORIDE 5 MILLIGRAM(S): 5 TABLET ORAL at 22:15

## 2018-11-18 RX ADMIN — Medication 1: at 08:56

## 2018-11-18 RX ADMIN — OXYCODONE HYDROCHLORIDE 5 MILLIGRAM(S): 5 TABLET ORAL at 21:26

## 2018-11-18 RX ADMIN — Medication 400 MILLIGRAM(S): at 07:22

## 2018-11-18 RX ADMIN — Medication 100 MILLIGRAM(S): at 21:29

## 2018-11-18 RX ADMIN — LISINOPRIL 10 MILLIGRAM(S): 2.5 TABLET ORAL at 05:56

## 2018-11-18 RX ADMIN — SODIUM CHLORIDE 75 MILLILITER(S): 9 INJECTION, SOLUTION INTRAVENOUS at 05:56

## 2018-11-18 RX ADMIN — Medication 400 MILLIGRAM(S): at 06:06

## 2018-11-18 NOTE — PROGRESS NOTE ADULT - ASSESSMENT
68F with DM, HTN, weight loss, admitted on 11/14/18 with left foot pain of several months duration. Evaluation did not reveal arterial insufficiency. Despite fever and leukocytosis, she appears markedly non toxic.    fever  leukocytosis  left adrenal nodule  monoarticular arthritis left ankle  Elevated ESR/ CRP  Procalcitonin 0.11    Antibiotics  Cefazolin 11/13; 11/16->  Zosyn  11/14  Vanco 11/14     Differential diagnosis included APLS vs vasculitis vs crystal arthropathy vs septic arthritis     Suggest:  Continue Cefazolin  Repeat blood cultures sent - f/u blood cultures  Vascular following - ischemic changes stable  Rheum following - APLS labs sent

## 2018-11-18 NOTE — PROGRESS NOTE ADULT - SUBJECTIVE AND OBJECTIVE BOX
CHIEF COMPLAINT:Patient is a 68y old  Female who presents with a chief complaint of Left foot cellulitis (14 Nov 2018 18:53)    	  Interval history: no acute events      Allergies:  No Known Allergies      PAST MEDICAL & SURGICAL HISTORY:  DM (diabetes mellitus)  HTN (hypertension)  Breast CA  No significant past surgical history      FAMILY HISTORY:  No pertinent family history in first degree relatives      REVIEW OF SYSTEMS:  CONSTITUTIONAL: No fever, weight loss, or fatigue  EYES: No eye pain, visual disturbances, or discharge  NECK: No pain or stiffness  RESPIRATORY: No cough or wheezing, no shortness of breath  CARDIOVASCULAR: No chest pain, palpitations, dizziness, or leg swelling  GASTROINTESTINAL: No abdominal or epigastric pain. No nausea, vomiting, diarrhea or constipation  GENITOURINARY: No dysuria, urinary frequency or urgency, no hematuria  NEUROLOGICAL: No headaches, memory loss, loss of strength, numbness, or tremors  SKIN: No itching, burning, rashes, or lesions   MUSCULOSKELETAL: No joint pain or swelling; No muscle, back, or extremity pain      Medications:  MEDICATIONS  (STANDING):  ceFAZolin   IVPB 1000 milliGRAM(s) IV Intermittent every 8 hours  dextrose 5% + sodium chloride 0.45%. 1000 milliLiter(s) (75 mL/Hr) IV Continuous <Continuous>  dextrose 5%. 1000 milliLiter(s) (50 mL/Hr) IV Continuous <Continuous>  dextrose 50% Injectable 12.5 Gram(s) IV Push once  heparin  Infusion 1500 Unit(s)/Hr (17 mL/Hr) IV Continuous <Continuous>  insulin lispro (HumaLOG) corrective regimen sliding scale   SubCutaneous three times a day before meals  lisinopril 10 milliGRAM(s) Oral daily    MEDICATIONS  (PRN):  acetaminophen   Tablet .. 975 milliGRAM(s) Oral every 6 hours PRN Mild Pain (1 - 3)  dextrose 40% Gel 15 Gram(s) Oral once PRN Blood Glucose LESS THAN 70 milliGRAM(s)/deciliter  glucagon  Injectable 1 milliGRAM(s) IntraMuscular once PRN Glucose LESS THAN 70 milligrams/deciliter  oxyCODONE    IR 5 milliGRAM(s) Oral every 6 hours PRN Moderate Pain (4 - 6)    	    PHYSICAL EXAM:  T(C): 38 (11-18-18 @ 05:53), Max: 38 (11-18-18 @ 05:53)  HR: 74 (11-18-18 @ 05:53) (74 - 108)  BP: 135/70 (11-18-18 @ 05:53) (134/71 - 146/87)  RR: 18 (11-18-18 @ 05:53) (18 - 18)  SpO2: 98% (11-18-18 @ 05:53) (95% - 100%)  Wt(kg): --  I&O's Summary    17 Nov 2018 07:01  -  18 Nov 2018 07:00  --------------------------------------------------------  IN: 0 mL / OUT: 1 mL / NET: -1 mL      Appearance: Normal	  HEENT:   NCAT, PERRL, EOMI	  Lymphatic: No lymphadenopathy  Cardiovascular: Normal S1 S2, RRR  Respiratory: Lungs clear to auscultation BL  Psychiatry: A & O x 3, Mood & affect appropriate  Gastrointestinal:  Soft, Non-tender, + BS  Skin: No rashes, No ecchymoses, No cyanosis	  Neurologic: Non-focal  Extremities: Normal range of motion, No clubbing, cyanosis or edema    LABS:	 	    CARDIAC MARKERS:                                9.3    22.12 )-----------( 418      ( 18 Nov 2018 06:19 )             28.6     11-18    134<L>  |  97<L>  |  8   ----------------------------<  140<H>  3.4<L>   |  26  |  0.84    Ca    9.9      18 Nov 2018 06:19  Phos  2.5     11-18  Mg     1.6     11-18    TPro  6.5  /  Alb  2.6<L>  /  TBili  0.2  /  DBili  x   /  AST  14  /  ALT  12  /  AlkPhos  74  11-18    proBNP:   Lipid Profile:   HgA1c:   TSH:

## 2018-11-18 NOTE — PROGRESS NOTE ADULT - SUBJECTIVE AND OBJECTIVE BOX
CC: Patient is a 68y old  Female who presents with a chief complaint of Left foot cellulitis (18 Nov 2018 09:07)    ID following for left foot pain    Interval History/ROS: Patient had fever this morning. Blood cultures sent. Remains with severe left leg pain.     Rest of ROS negative.    Allergies  No Known Allergies    ANTIMICROBIALS:  ceFAZolin   IVPB 1000 every 8 hours    PE:    Vital Signs Last 24 Hrs  T(C): 38 (18 Nov 2018 05:53), Max: 38 (18 Nov 2018 05:53)  T(F): 100.4 (18 Nov 2018 05:53), Max: 100.4 (18 Nov 2018 05:53)  HR: 74 (18 Nov 2018 05:53) (74 - 108)  BP: 135/70 (18 Nov 2018 05:53) (134/71 - 146/87)  BP(mean): --  RR: 18 (18 Nov 2018 05:53) (18 - 18)  SpO2: 98% (18 Nov 2018 05:53) (95% - 100%)    Gen: AOx3, NAD, non-toxic  CV: S1+S2 normal, no murmurs  Resp: Clear bilat, no resp distress  Abd: Soft, nontender, +BS  Ext: left leg swelling and pain  : No [place]  IV/Skin: No thrombophlebitis  Neuro: no focal deficits    LABS:                          9.3    22.12 )-----------( 418      ( 18 Nov 2018 06:19 )             28.6       11-18    134<L>  |  97<L>  |  8   ----------------------------<  140<H>  3.4<L>   |  26  |  0.84    Ca    9.9      18 Nov 2018 06:19  Phos  2.5     11-18  Mg     1.6     11-18    TPro  6.5  /  Alb  2.6<L>  /  TBili  0.2  /  DBili  x   /  AST  14  /  ALT  12  /  AlkPhos  74  11-18    MICROBIOLOGY:  v  BLOOD  11-14-18 --  --  --    RADIOLOGY:    < from: CT Angio Abd Aorta w/run-off w/ IV Cont (11.14.18 @ 09:03) >  IMPRESSION:     Normal caliber and patent abdominal aorta.    Three vessel run off to both lower extremities. Left lower extremity   edema without subcutaneous emphysema.    Indeterminate left adrenal gland lesion measuring 3.0 cm. This can be   further evaluated with dedicated MRI adrenal gland protocol.    < end of copied text >

## 2018-11-18 NOTE — PROGRESS NOTE ADULT - ASSESSMENT
69 yo F w pmhx of T2DM, HTN presenting with 1 month of LLE swelling and discoloration admitted for cellulitis      Problem/Recommendation - 1:  Problem: Cellulitis of foot, left. Recommendation: - Pt meeting sepsis criteria on admission; sepsis now resolved  - ID appreciated, c/w abx per ID  - Appreciate podiatry consult  - tylenol PRN for fever  - WBC uptrending, but no signs of worsening infection, ID f/u. monitor     Problem/Recommendation - 2:  ·  Problem: HTN (hypertension).  Recommendation: BP well controlled  c/w home med of lisinopril.      Problem/Recommendation - 3:  ·  Problem: DM (diabetes mellitus).  Recommendation: Blood glucose well controlled  A1C 6.3  C/w sliding scale insulin.      Problem/Recommendation - 4:  ·  Problem: Microcytic anemia.  Recommendation: likely KRIS, outpt f/u     Problem/Recommendation - 5:  ·  Problem: Ischemia of LLE - c/w Heparin gtt for now, vasc to comment on need to continue AC long term, ? switch to NOAC  Rheum and Heme appreciated, await vasculitis w/u    Adrenal incidenteloma - await MRI, surg appreciated 67 yo F w pmhx of T2DM, HTN presenting with 1 month of LLE swelling and discoloration admitted for cellulitis      Problem/Recommendation - 1:  Problem: Cellulitis of foot, left. Recommendation: - Pt meeting sepsis criteria on admission; sepsis now resolved  - ID appreciated, c/w abx per ID  - Appreciate podiatry consult  - tylenol PRN for fever  - WBC uptrending, fever this AM, ID f/u, might need to switch back to Vanco     Problem/Recommendation - 2:  ·  Problem: HTN (hypertension).  Recommendation: BP well controlled  c/w home med of lisinopril.      Problem/Recommendation - 3:  ·  Problem: DM (diabetes mellitus).  Recommendation: Blood glucose well controlled  A1C 6.3  C/w sliding scale insulin.      Problem/Recommendation - 4:  ·  Problem: Microcytic anemia.  Recommendation: likely KRIS, outpt f/u     Problem/Recommendation - 5:  ·  Problem: Ischemia of LLE - c/w Heparin gtt for now, vasc to comment on need to continue AC long term, ? switch to NOAC  Rheum and Heme appreciated, await vasculitis w/u    Adrenal incidenteloma - await MRI, surg appreciated

## 2018-11-19 LAB
APTT BLD: 59.4 SEC — HIGH (ref 27.5–36.3)
BACTERIA BLD CULT: SIGNIFICANT CHANGE UP
BACTERIA BLD CULT: SIGNIFICANT CHANGE UP
BUN SERPL-MCNC: 8 MG/DL — SIGNIFICANT CHANGE UP (ref 7–23)
CALCIUM SERPL-MCNC: 10 MG/DL — SIGNIFICANT CHANGE UP (ref 8.4–10.5)
CHLORIDE SERPL-SCNC: 98 MMOL/L — SIGNIFICANT CHANGE UP (ref 98–107)
CO2 SERPL-SCNC: 25 MMOL/L — SIGNIFICANT CHANGE UP (ref 22–31)
CREAT SERPL-MCNC: 0.85 MG/DL — SIGNIFICANT CHANGE UP (ref 0.5–1.3)
DRVVT SCREEN TO CONFIRM RATIO: 0.88 — SIGNIFICANT CHANGE UP (ref 0–1.2)
GLUCOSE BLDC GLUCOMTR-MCNC: 126 MG/DL — HIGH (ref 70–99)
GLUCOSE BLDC GLUCOMTR-MCNC: 137 MG/DL — HIGH (ref 70–99)
GLUCOSE BLDC GLUCOMTR-MCNC: 140 MG/DL — HIGH (ref 70–99)
GLUCOSE BLDC GLUCOMTR-MCNC: 144 MG/DL — HIGH (ref 70–99)
GLUCOSE SERPL-MCNC: 130 MG/DL — HIGH (ref 70–99)
HCT VFR BLD CALC: 29.1 % — LOW (ref 34.5–45)
HGB BLD-MCNC: 9.3 G/DL — LOW (ref 11.5–15.5)
INR BLD: 1.2 — HIGH (ref 0.88–1.17)
MCHC RBC-ENTMCNC: 24.8 PG — LOW (ref 27–34)
MCHC RBC-ENTMCNC: 32 % — SIGNIFICANT CHANGE UP (ref 32–36)
MCV RBC AUTO: 77.6 FL — LOW (ref 80–100)
NORMALIZED SCT PPP-RTO: 0.66 — LOW (ref 0.88–1.27)
NRBC # FLD: 0 — SIGNIFICANT CHANGE UP
PLATELET # BLD AUTO: 385 K/UL — SIGNIFICANT CHANGE UP (ref 150–400)
PMV BLD: 10 FL — SIGNIFICANT CHANGE UP (ref 7–13)
POTASSIUM SERPL-MCNC: 4 MMOL/L — SIGNIFICANT CHANGE UP (ref 3.5–5.3)
POTASSIUM SERPL-SCNC: 4 MMOL/L — SIGNIFICANT CHANGE UP (ref 3.5–5.3)
PROTHROM AB SERPL-ACNC: 13.7 SEC — HIGH (ref 9.8–13.1)
RBC # BLD: 3.75 M/UL — LOW (ref 3.8–5.2)
RBC # FLD: 17.3 % — HIGH (ref 10.3–14.5)
SODIUM SERPL-SCNC: 135 MMOL/L — SIGNIFICANT CHANGE UP (ref 135–145)
SPECIMEN SOURCE: SIGNIFICANT CHANGE UP
SPECIMEN SOURCE: SIGNIFICANT CHANGE UP
WBC # BLD: 19.17 K/UL — HIGH (ref 3.8–10.5)
WBC # FLD AUTO: 19.17 K/UL — HIGH (ref 3.8–10.5)

## 2018-11-19 PROCEDURE — 93971 EXTREMITY STUDY: CPT | Mod: 26,LT

## 2018-11-19 PROCEDURE — ZZZZZ: CPT

## 2018-11-19 PROCEDURE — 99232 SBSQ HOSP IP/OBS MODERATE 35: CPT

## 2018-11-19 PROCEDURE — 99232 SBSQ HOSP IP/OBS MODERATE 35: CPT | Mod: GC

## 2018-11-19 PROCEDURE — 99223 1ST HOSP IP/OBS HIGH 75: CPT | Mod: GC

## 2018-11-19 RX ORDER — OXYCODONE HYDROCHLORIDE 5 MG/1
10 TABLET ORAL EVERY 6 HOURS
Qty: 0 | Refills: 0 | Status: DISCONTINUED | OUTPATIENT
Start: 2018-11-19 | End: 2018-11-22

## 2018-11-19 RX ORDER — IBUPROFEN 200 MG
400 TABLET ORAL ONCE
Qty: 0 | Refills: 0 | Status: COMPLETED | OUTPATIENT
Start: 2018-11-19 | End: 2018-11-19

## 2018-11-19 RX ADMIN — Medication 100 MILLIGRAM(S): at 21:20

## 2018-11-19 RX ADMIN — SODIUM CHLORIDE 75 MILLILITER(S): 9 INJECTION, SOLUTION INTRAVENOUS at 21:20

## 2018-11-19 RX ADMIN — OXYCODONE HYDROCHLORIDE 10 MILLIGRAM(S): 5 TABLET ORAL at 10:05

## 2018-11-19 RX ADMIN — Medication 100 MILLIGRAM(S): at 06:05

## 2018-11-19 RX ADMIN — Medication 100 MILLIGRAM(S): at 14:46

## 2018-11-19 RX ADMIN — LISINOPRIL 10 MILLIGRAM(S): 2.5 TABLET ORAL at 06:05

## 2018-11-19 RX ADMIN — OXYCODONE HYDROCHLORIDE 10 MILLIGRAM(S): 5 TABLET ORAL at 21:18

## 2018-11-19 RX ADMIN — OXYCODONE HYDROCHLORIDE 10 MILLIGRAM(S): 5 TABLET ORAL at 22:30

## 2018-11-19 RX ADMIN — Medication 400 MILLIGRAM(S): at 20:51

## 2018-11-19 RX ADMIN — Medication 400 MILLIGRAM(S): at 22:15

## 2018-11-19 RX ADMIN — OXYCODONE HYDROCHLORIDE 10 MILLIGRAM(S): 5 TABLET ORAL at 09:35

## 2018-11-19 NOTE — PROGRESS NOTE ADULT - SUBJECTIVE AND OBJECTIVE BOX
68y old  Female who presents with a chief complaint of Left foot cellulitis (19 Nov 2018 18:24)      Interval history:  Low grade temp, some soreness of throat, pain in the lt leg relatively better today.     No Known Allergies    Antimicrobials:    ceFAZolin   IVPB 1000 milliGRAM(s) IV Intermittent every 8 hours    REVIEW OF SYSTEMS:  No chest pain  No cough, no SOB  No N/V/D, no abdominal pain  No dysuria  No rash.     Vital Signs Last 24 Hrs  T(C): 37.9 (11-19-18 @ 14:54), Max: 37.9 (11-19-18 @ 14:54)  T(F): 100.2 (11-19-18 @ 14:54), Max: 100.2 (11-19-18 @ 14:54)  HR: 94 (11-19-18 @ 14:54) (88 - 94)  BP: 135/77 (11-19-18 @ 14:54) (135/77 - 148/82)  RR: 17 (11-19-18 @ 14:54) (17 - 18)  SpO2: 97% (11-19-18 @ 14:54) (96% - 98%)    PHYSICAL EXAM:  Patient in no acute distress. AAOX3.  + PHARYNGEAL ERYTHEMA WITH ENLARGED TONSILS.  Cardiovascular: S1S2 normal, tachycardic.   Lungs: + air entry B/L lung fields.  Gastrointestinal: soft, nontender, nondistended.  Extremities: lt foot edema with cold toes with cyanosis, ankle swelling with ecchymosis with warmth.   IV sites not inflamed.                           9.3    19.17 )-----------( 385      ( 19 Nov 2018 05:10 )             29.1   11-19    135  |  98  |  8   ----------------------------<  130<H>  4.0   |  25  |  0.85    Ca    10.0      19 Nov 2018 05:10  Phos  2.5     11-18  Mg     1.6     11-18    TPro  6.5  /  Alb  2.6<L>  /  TBili  0.2  /  DBili  x   /  AST  14  /  ALT  12  /  AlkPhos  74  11-18      LIVER FUNCTIONS - ( 18 Nov 2018 06:19 )  Alb: 2.6 g/dL / Pro: 6.5 g/dL / ALK PHOS: 74 u/L / ALT: 12 u/L / AST: 14 u/L / GGT: x               Culture - Blood (collected 18 Nov 2018 06:45)  Source: BLOOD VENOUS  Preliminary Report (19 Nov 2018 06:44):    NO ORGANISMS ISOLATED    NO ORGANISMS ISOLATED AT 24 HOURS    Culture - Blood (collected 18 Nov 2018 06:45)  Source: BLOOD PERIPHERAL  Preliminary Report (19 Nov 2018 06:44):    NO ORGANISMS ISOLATED    NO ORGANISMS ISOLATED AT 24 HOURS      Radiology:  < from:  Duplex Venous Lower Ext Ltd, Left (11.19.18 @ 11:54) >  IMPRESSION:     No evidence of left lower extremity deep venous thrombosis.    Left Baker's cyst.

## 2018-11-19 NOTE — PROGRESS NOTE ADULT - ATTENDING COMMENTS
Miss Whipple was seen during hematology consultation rounds. She was admitted to the hospital over the weekend. She has a several month history of pain in the left leg. She's been followed by a podiatrist. A biopsy was apparently performed, revealing evidence of vasculitis. The sedimentation rate was found to be 85. She has been seen by rheumatology, and several laboratory tests are pending at this time.    On physical examination, the left foot is tender and certainly cool to the touch compared to the left foot. Pulses are easily determined on the right foot, but are not appreciated on the left foot. She is on heparin at this time, and we are asked if heparin needs to be continued. Until her diagnosis is established, I would not stop heparin at this particular time. All questions were answered to the best of my ability and to her apparent satisfaction. I agree with the assessment and plan as stated above in the note from Dr. Starks.

## 2018-11-19 NOTE — CONSULT NOTE ADULT - SUBJECTIVE AND OBJECTIVE BOX
HPI:  69 y/o F h/o T2DM, HTN, and breast CA s/p lumpectomy + chemo/XRT (1993) admitted 11/13 for LLE pain, swelling, and redness. Derm is consulted for the same. Sx started in July 2018 w/ bilateral LE swelling. Seen by Cards as outpt with TTE reportedly wnl. Since then, sx have worsened with onset of low-grade fevers, and progressive LLE pain and swelling. No chills/NS, abdominal pain, arthralgias, myalgias, oral/genital ulcerations, or weight loss. No numbness/tingling, but unclear whether her pain is c/w claudication. Of note, pt was evaluated at [facility] in 10/2018, with biopsy of R calf showing a pauci-inflammatory lesion of endarteritis obliterans and intimal C5b-9 deposition (findings most suggestive of APLS). H/o 2 uncomplicated live births and 2 elective abortions. Pt has ~50 pack-year h/o tobacco use.    Hospital course w/ labs notable for WBC 19-20s (PMN-predominant), ESR 85, acute hep panel/syphilis negative. CTA (11/14) w/o evidence of arterial stenosis. LLE U/S negative for DVT, but noted enlarged L inguinal LNs. Continues to spike low-grade fevers, now on heparin gtt + cefazolin. APLS serologies pending per Heme-Onc. Seen by Rheum with c/f vasculitis vs. APLS.       PAST MEDICAL & SURGICAL HISTORY:  DM (diabetes mellitus)  HTN (hypertension)  Breast CA  No significant past surgical history      REVIEW OF SYSTEMS    General: no fevers/chills, no lethargy	    Skin/Breast: see HPI  	  Ophthalmologic: no eye pain or change in vision  	  ENMT: no dysphagia or change in hearing    Respiratory and Thorax: no SOB or cough  	  Cardiovascular: no palpitations or chest pain    Gastrointestinal: no abdominal pain or blood in stool     Genitourinary: no dysuria or frequency    Musculoskeletal: no joint pains    Neurological: no weakness, numbness , or tingling    MEDICATIONS  (STANDING):  ceFAZolin   IVPB 1000 milliGRAM(s) IV Intermittent every 8 hours  dextrose 5% + sodium chloride 0.45%. 1000 milliLiter(s) (75 mL/Hr) IV Continuous <Continuous>  dextrose 5%. 1000 milliLiter(s) (50 mL/Hr) IV Continuous <Continuous>  dextrose 50% Injectable 12.5 Gram(s) IV Push once  heparin  Infusion 1500 Unit(s)/Hr (17 mL/Hr) IV Continuous <Continuous>  insulin lispro (HumaLOG) corrective regimen sliding scale   SubCutaneous three times a day before meals  lisinopril 10 milliGRAM(s) Oral daily    MEDICATIONS  (PRN):  acetaminophen   Tablet .. 975 milliGRAM(s) Oral every 6 hours PRN Mild Pain (1 - 3)  dextrose 40% Gel 15 Gram(s) Oral once PRN Blood Glucose LESS THAN 70 milliGRAM(s)/deciliter  glucagon  Injectable 1 milliGRAM(s) IntraMuscular once PRN Glucose LESS THAN 70 milligrams/deciliter  oxyCODONE    IR 10 milliGRAM(s) Oral every 6 hours PRN moderate and severe pain      Allergies    No Known Allergies    Intolerances        SOCIAL HISTORY:    FAMILY HISTORY:  No pertinent family history in first degree relatives      Vital Signs Last 24 Hrs  T(C): 37.9 (19 Nov 2018 14:54), Max: 37.9 (19 Nov 2018 14:54)  T(F): 100.2 (19 Nov 2018 14:54), Max: 100.2 (19 Nov 2018 14:54)  HR: 94 (19 Nov 2018 14:54) (88 - 94)  BP: 135/77 (19 Nov 2018 14:54) (135/77 - 148/82)  BP(mean): --  RR: 17 (19 Nov 2018 14:54) (17 - 18)  SpO2: 97% (19 Nov 2018 14:54) (96% - 98%)    PHYSICAL EXAM:     The patient was alert and oriented X 3, well nourished, and in no  apparent distress.  OP showed no ulcerations  There was no visible lymphadenopathy.  Conjunctiva were non injected  There was no clubbing or edema of extremities.  The scalp, hair, face, eyebrows, lips, OP, neck, chest, back,   extremities X 4, nails were examined.  There was no hyperhidrosis or bromhidrosis.    Of note on skin exam:   - tender, red-brown vascular cords over the L calf/shin w/o ulceration or necrosis. Cyanosis noted of all 5 LLE digits. Cool and tender to palpation. 2+ edema LLE > RLE. LLE palpable DP and TP pulses.   - no oral, genital, or other cutaneous lesions      LABS:                        9.3    19.17 )-----------( 385      ( 19 Nov 2018 05:10 )             29.1     11-19    135  |  98  |  8   ----------------------------<  130<H>  4.0   |  25  |  0.85    Ca    10.0      19 Nov 2018 05:10  Phos  2.5     11-18  Mg     1.6     11-18    TPro  6.5  /  Alb  2.6<L>  /  TBili  0.2  /  DBili  x   /  AST  14  /  ALT  12  /  AlkPhos  74  11-18    PT/INR - ( 19 Nov 2018 05:10 )   PT: 13.7 SEC;   INR: 1.20          PTT - ( 19 Nov 2018 05:10 )  PTT:59.4 SEC      RADIOLOGY & ADDITIONAL STUDIES: HPI:  69 y/o F h/o T2DM, HTN, and breast CA s/p lumpectomy + chemo/XRT (1993) admitted 11/13 for LLE pain, swelling, and redness. Derm is consulted for the same. Sx started in July 2018 w/ bilateral LE swelling. Seen by Cards as outpt with TTE reportedly wnl. Since then, sx have worsened with onset of low-grade fevers, and progressive LLE pain and swelling. No chills/NS, abdominal pain, arthralgias, myalgias, oral/genital ulcerations, or weight loss. No numbness/tingling, but unclear whether her pain is c/w claudication. Of note, pt was evaluated at [facility] in 10/2018, with biopsy of R calf showing a pauci-inflammatory lesion of endarteritis obliterans and intimal C5b-9 deposition (findings most suggestive of APLS). H/o 2 uncomplicated live births and 2 elective abortions. Pt has ~50 pack-year h/o tobacco use.    Hospital course w/ labs notable for WBC 19-20s (PMN-predominant), ESR 85, acute hep panel/syphilis negative. CTA (11/14) w/o evidence of arterial stenosis. LLE U/S negative for DVT, but noted enlarged L inguinal LNs. Continues to spike low-grade fevers, now on heparin gtt + cefazolin. Seen by Rheum with c/f vasculitis vs. APLS, serologies pending.       PAST MEDICAL & SURGICAL HISTORY:  DM (diabetes mellitus)  HTN (hypertension)  Breast CA  No significant past surgical history      REVIEW OF SYSTEMS    General: no fevers/chills, no lethargy	    Skin/Breast: see HPI  	  Ophthalmologic: no eye pain or change in vision  	  ENMT: no dysphagia or change in hearing    Respiratory and Thorax: no SOB or cough  	  Cardiovascular: no palpitations or chest pain    Gastrointestinal: no abdominal pain or blood in stool     Genitourinary: no dysuria or frequency    Musculoskeletal: no joint pains    Neurological: no weakness, numbness , or tingling    MEDICATIONS  (STANDING):  ceFAZolin   IVPB 1000 milliGRAM(s) IV Intermittent every 8 hours  dextrose 5% + sodium chloride 0.45%. 1000 milliLiter(s) (75 mL/Hr) IV Continuous <Continuous>  dextrose 5%. 1000 milliLiter(s) (50 mL/Hr) IV Continuous <Continuous>  dextrose 50% Injectable 12.5 Gram(s) IV Push once  heparin  Infusion 1500 Unit(s)/Hr (17 mL/Hr) IV Continuous <Continuous>  insulin lispro (HumaLOG) corrective regimen sliding scale   SubCutaneous three times a day before meals  lisinopril 10 milliGRAM(s) Oral daily    MEDICATIONS  (PRN):  acetaminophen   Tablet .. 975 milliGRAM(s) Oral every 6 hours PRN Mild Pain (1 - 3)  dextrose 40% Gel 15 Gram(s) Oral once PRN Blood Glucose LESS THAN 70 milliGRAM(s)/deciliter  glucagon  Injectable 1 milliGRAM(s) IntraMuscular once PRN Glucose LESS THAN 70 milligrams/deciliter  oxyCODONE    IR 10 milliGRAM(s) Oral every 6 hours PRN moderate and severe pain      Allergies    No Known Allergies    Intolerances        SOCIAL HISTORY:    FAMILY HISTORY:  No pertinent family history in first degree relatives      Vital Signs Last 24 Hrs  T(C): 37.9 (19 Nov 2018 14:54), Max: 37.9 (19 Nov 2018 14:54)  T(F): 100.2 (19 Nov 2018 14:54), Max: 100.2 (19 Nov 2018 14:54)  HR: 94 (19 Nov 2018 14:54) (88 - 94)  BP: 135/77 (19 Nov 2018 14:54) (135/77 - 148/82)  BP(mean): --  RR: 17 (19 Nov 2018 14:54) (17 - 18)  SpO2: 97% (19 Nov 2018 14:54) (96% - 98%)    PHYSICAL EXAM:     The patient was alert and oriented X 3, well nourished, and in no  apparent distress.  OP showed no ulcerations  There was no visible lymphadenopathy.  Conjunctiva were non injected  There was no clubbing or edema of extremities.  The scalp, hair, face, eyebrows, lips, OP, neck, chest, back,   extremities X 4, nails were examined.  There was no hyperhidrosis or bromhidrosis.    Of note on skin exam:   - tender, red-brown vascular cords over the L calf/shin w/o ulceration or necrosis. Toes are dusky and tender to palpation; L foot is cool. 2+ edema LLE > RLE. LLE palpable DP and TP pulses.   - no oral, genital, or other cutaneous lesions      LABS:                        9.3    19.17 )-----------( 385      ( 19 Nov 2018 05:10 )             29.1     11-19    135  |  98  |  8   ----------------------------<  130<H>  4.0   |  25  |  0.85    Ca    10.0      19 Nov 2018 05:10  Phos  2.5     11-18  Mg     1.6     11-18    TPro  6.5  /  Alb  2.6<L>  /  TBili  0.2  /  DBili  x   /  AST  14  /  ALT  12  /  AlkPhos  74  11-18    PT/INR - ( 19 Nov 2018 05:10 )   PT: 13.7 SEC;   INR: 1.20          PTT - ( 19 Nov 2018 05:10 )  PTT:59.4 SEC      RADIOLOGY & ADDITIONAL STUDIES: HPI:  67 y/o F h/o T2DM, HTN, and breast CA s/p lumpectomy + chemo/XRT (1993) admitted 11/13 for LLE pain, swelling, and redness. Derm is consulted for the same. Sx started in July 2018 w/ bilateral LE swelling. Seen by Cards as outpt with TTE reportedly wnl. Since then, sx have worsened with onset of low-grade fevers, and progressive LLE pain and swelling. No chills/NS, abdominal pain, arthralgias, myalgias, oral/genital ulcerations, or weight loss. No numbness/tingling, but unclear whether her pain is c/w claudication. Of note, pt was evaluated at [facility] in 10/2018, with biopsy of R calf showing a pauci-inflammatory lesion of endarteritis obliterans and intimal C5b-9 deposition (findings most suggestive of APLS). H/o 2 uncomplicated live births and 2 elective abortions. Pt has ~50 pack-year h/o tobacco use.    Hospital course w/ labs notable for WBC 19-20s (PMN-predominant), ESR 85, acute hep panel/syphilis negative. CTA (11/14) w/o evidence of arterial stenosis. LLE U/S negative for DVT, but noted enlarged L inguinal LNs. Continues to spike low-grade fevers, now on heparin gtt + cefazolin. Seen by Rheum with c/f vasculitis vs. APLS, serologies pending.       PAST MEDICAL & SURGICAL HISTORY:  DM (diabetes mellitus)  HTN (hypertension)  Breast CA  No significant past surgical history      REVIEW OF SYSTEMS    General: no fevers/chills, no lethargy	    Skin/Breast: see HPI  	  Ophthalmologic: no eye pain or change in vision  	  ENMT: no dysphagia or change in hearing    Respiratory and Thorax: no SOB or cough  	  Cardiovascular: no palpitations or chest pain    Gastrointestinal: no abdominal pain or blood in stool     Genitourinary: no dysuria or frequency    Musculoskeletal: no joint pains    Neurological: no weakness, numbness , or tingling    MEDICATIONS  (STANDING):  ceFAZolin   IVPB 1000 milliGRAM(s) IV Intermittent every 8 hours  dextrose 5% + sodium chloride 0.45%. 1000 milliLiter(s) (75 mL/Hr) IV Continuous <Continuous>  dextrose 5%. 1000 milliLiter(s) (50 mL/Hr) IV Continuous <Continuous>  dextrose 50% Injectable 12.5 Gram(s) IV Push once  heparin  Infusion 1500 Unit(s)/Hr (17 mL/Hr) IV Continuous <Continuous>  insulin lispro (HumaLOG) corrective regimen sliding scale   SubCutaneous three times a day before meals  lisinopril 10 milliGRAM(s) Oral daily    MEDICATIONS  (PRN):  acetaminophen   Tablet .. 975 milliGRAM(s) Oral every 6 hours PRN Mild Pain (1 - 3)  dextrose 40% Gel 15 Gram(s) Oral once PRN Blood Glucose LESS THAN 70 milliGRAM(s)/deciliter  glucagon  Injectable 1 milliGRAM(s) IntraMuscular once PRN Glucose LESS THAN 70 milligrams/deciliter  oxyCODONE    IR 10 milliGRAM(s) Oral every 6 hours PRN moderate and severe pain      Allergies    No Known Allergies    Intolerances        SOCIAL HISTORY: 20 pack-year smoking history. No significant alcohol or drug use.    FAMILY HISTORY:  No pertinent family history in first degree relatives      Vital Signs Last 24 Hrs  T(C): 37.9 (19 Nov 2018 14:54), Max: 37.9 (19 Nov 2018 14:54)  T(F): 100.2 (19 Nov 2018 14:54), Max: 100.2 (19 Nov 2018 14:54)  HR: 94 (19 Nov 2018 14:54) (88 - 94)  BP: 135/77 (19 Nov 2018 14:54) (135/77 - 148/82)  BP(mean): --  RR: 17 (19 Nov 2018 14:54) (17 - 18)  SpO2: 97% (19 Nov 2018 14:54) (96% - 98%)    PHYSICAL EXAM:     The patient was alert and oriented X 3, well nourished, and in no  apparent distress.  OP showed no ulcerations  There was no visible lymphadenopathy.  Conjunctiva were non injected  There was no clubbing or edema of extremities.  The scalp, hair, face, eyebrows, lips, OP, neck, chest, back,   extremities X 4, nails were examined.  There was no hyperhidrosis or bromhidrosis.    Of note on skin exam:   - tender, red-brown vascular cords over the L calf/shin w/o ulceration or necrosis. Toes are dusky and tender to palpation; L foot is cool. Occasional petechiae noted. 2+ edema LLE > RLE. LLE palpable DP and TP pulses.   - no oral, genital, or other cutaneous lesions      LABS:                        9.3    19.17 )-----------( 385      ( 19 Nov 2018 05:10 )             29.1     11-19    135  |  98  |  8   ----------------------------<  130<H>  4.0   |  25  |  0.85    Ca    10.0      19 Nov 2018 05:10  Phos  2.5     11-18  Mg     1.6     11-18    TPro  6.5  /  Alb  2.6<L>  /  TBili  0.2  /  DBili  x   /  AST  14  /  ALT  12  /  AlkPhos  74  11-18    PT/INR - ( 19 Nov 2018 05:10 )   PT: 13.7 SEC;   INR: 1.20          PTT - ( 19 Nov 2018 05:10 )  PTT:59.4 SEC      RADIOLOGY & ADDITIONAL STUDIES: HPI:  67 y/o F h/o T2DM, HTN, and breast CA s/p lumpectomy + chemo/XRT (1993) admitted 11/13 for LLE pain, swelling, and redness. Derm is consulted for the same. Sx started in July 2018 w/ bilateral LE swelling. Seen by Cards as outpt with TTE reportedly wnl. Since then, sx have worsened with onset of low-grade fevers, and progressive LLE pain and swelling. No chills/NS, abdominal pain, arthralgias, myalgias, oral/genital ulcerations, or weight loss. No numbness/tingling, but unclear whether her pain is c/w claudication. Of note, pt was evaluated at [facility] in 10/2018, with biopsy of R calf showing a pauci-inflammatory arterial intimal thickening and intimal C5b-9 deposition c/w vasculopathy v. endarteritis obliterans. H/o 2 uncomplicated live births and 2 elective abortions. Pt has ~50 pack-year h/o tobacco use.    Hospital course w/ labs notable for WBC 19-20s (PMN-predominant), ESR 85, acute hep panel/syphilis negative. CTA (11/14) w/o evidence of arterial stenosis. LLE U/S negative for DVT, but noted enlarged L inguinal LNs. Continues to spike low-grade fevers, now on heparin gtt + cefazolin. Seen by Rheum with c/f vasculitis vs. APLS, serologies pending.       PAST MEDICAL & SURGICAL HISTORY:  DM (diabetes mellitus)  HTN (hypertension)  Breast CA  No significant past surgical history      REVIEW OF SYSTEMS    General: no fevers/chills, no lethargy	    Skin/Breast: see HPI  	  Ophthalmologic: no eye pain or change in vision  	  ENMT: no dysphagia or change in hearing    Respiratory and Thorax: no SOB or cough  	  Cardiovascular: no palpitations or chest pain    Gastrointestinal: no abdominal pain or blood in stool     Genitourinary: no dysuria or frequency    Musculoskeletal: no joint pains    Neurological: no weakness, numbness , or tingling    MEDICATIONS  (STANDING):  ceFAZolin   IVPB 1000 milliGRAM(s) IV Intermittent every 8 hours  dextrose 5% + sodium chloride 0.45%. 1000 milliLiter(s) (75 mL/Hr) IV Continuous <Continuous>  dextrose 5%. 1000 milliLiter(s) (50 mL/Hr) IV Continuous <Continuous>  dextrose 50% Injectable 12.5 Gram(s) IV Push once  heparin  Infusion 1500 Unit(s)/Hr (17 mL/Hr) IV Continuous <Continuous>  insulin lispro (HumaLOG) corrective regimen sliding scale   SubCutaneous three times a day before meals  lisinopril 10 milliGRAM(s) Oral daily    MEDICATIONS  (PRN):  acetaminophen   Tablet .. 975 milliGRAM(s) Oral every 6 hours PRN Mild Pain (1 - 3)  dextrose 40% Gel 15 Gram(s) Oral once PRN Blood Glucose LESS THAN 70 milliGRAM(s)/deciliter  glucagon  Injectable 1 milliGRAM(s) IntraMuscular once PRN Glucose LESS THAN 70 milligrams/deciliter  oxyCODONE    IR 10 milliGRAM(s) Oral every 6 hours PRN moderate and severe pain      Allergies    No Known Allergies    Intolerances        SOCIAL HISTORY: 20 pack-year smoking history. No significant alcohol or drug use.    FAMILY HISTORY:  No pertinent family history in first degree relatives      Vital Signs Last 24 Hrs  T(C): 37.9 (19 Nov 2018 14:54), Max: 37.9 (19 Nov 2018 14:54)  T(F): 100.2 (19 Nov 2018 14:54), Max: 100.2 (19 Nov 2018 14:54)  HR: 94 (19 Nov 2018 14:54) (88 - 94)  BP: 135/77 (19 Nov 2018 14:54) (135/77 - 148/82)  BP(mean): --  RR: 17 (19 Nov 2018 14:54) (17 - 18)  SpO2: 97% (19 Nov 2018 14:54) (96% - 98%)    PHYSICAL EXAM:     The patient was alert and oriented X 3, well nourished, and in no  apparent distress.  OP showed no ulcerations  There was no visible lymphadenopathy.  Conjunctiva were non injected  There was no clubbing or edema of extremities.  The scalp, hair, face, eyebrows, lips, OP, neck, chest, back,   extremities X 4, nails were examined.  There was no hyperhidrosis or bromhidrosis.    Of note on skin exam:   - Toes over the L foot are dusky and tender to palpation; L foot is cool. 2+ edema LLE > RLE. Unable to palpate DP and PT pulses   - no oral, genital, or other cutaneous lesions      LABS:                        9.3    19.17 )-----------( 385      ( 19 Nov 2018 05:10 )             29.1     11-19    135  |  98  |  8   ----------------------------<  130<H>  4.0   |  25  |  0.85    Ca    10.0      19 Nov 2018 05:10  Phos  2.5     11-18  Mg     1.6     11-18    TPro  6.5  /  Alb  2.6<L>  /  TBili  0.2  /  DBili  x   /  AST  14  /  ALT  12  /  AlkPhos  74  11-18    PT/INR - ( 19 Nov 2018 05:10 )   PT: 13.7 SEC;   INR: 1.20          PTT - ( 19 Nov 2018 05:10 )  PTT:59.4 SEC      RADIOLOGY & ADDITIONAL STUDIES:

## 2018-11-19 NOTE — CONSULT NOTE ADULT - SUBJECTIVE AND OBJECTIVE BOX
Patient is a 68y old  Female who presents with a chief complaint of Left foot cellulitis (19 Nov 2018 08:42)      HPI:  69 yo woman with a hx of DM and HTN presents for evaluation of left foot pain, swelling and redness x1 month. The patient saw Dr. Lema in clinic today and was advised to present to ED for evaluation. The pain is only minimally alleviated by OTC pain medications. She reports fevers at home, denies chills, N/V, or drainage from the left foot. The patient is minimally ambulatory w/ cane at home.    ED Course: Patient febrile to 101.3, tachycardic to 120s, improved after IV fluid administration. ED noted streaking up left leg, begun on broad-spectrum abx and cultures sent. (14 Nov 2018 00:22)      PAST MEDICAL & SURGICAL HISTORY:  DM (diabetes mellitus)  HTN (hypertension)  Breast CA  No significant past surgical history      MEDICATIONS  (STANDING):  ceFAZolin   IVPB 1000 milliGRAM(s) IV Intermittent every 8 hours  dextrose 5% + sodium chloride 0.45%. 1000 milliLiter(s) (75 mL/Hr) IV Continuous <Continuous>  dextrose 5%. 1000 milliLiter(s) (50 mL/Hr) IV Continuous <Continuous>  dextrose 50% Injectable 12.5 Gram(s) IV Push once  heparin  Infusion 1500 Unit(s)/Hr (17 mL/Hr) IV Continuous <Continuous>  insulin lispro (HumaLOG) corrective regimen sliding scale   SubCutaneous three times a day before meals  lisinopril 10 milliGRAM(s) Oral daily    MEDICATIONS  (PRN):  acetaminophen   Tablet .. 975 milliGRAM(s) Oral every 6 hours PRN Mild Pain (1 - 3)  dextrose 40% Gel 15 Gram(s) Oral once PRN Blood Glucose LESS THAN 70 milliGRAM(s)/deciliter  glucagon  Injectable 1 milliGRAM(s) IntraMuscular once PRN Glucose LESS THAN 70 milligrams/deciliter  oxyCODONE    IR 10 milliGRAM(s) Oral every 6 hours PRN moderate and severe pain      ICU Vital Signs Last 24 Hrs  T(C): 37.3 (19 Nov 2018 06:03), Max: 37.4 (19 Nov 2018 01:36)  T(F): 99.2 (19 Nov 2018 06:03), Max: 99.4 (19 Nov 2018 01:36)  HR: 93 (19 Nov 2018 06:03) (88 - 94)  BP: 144/79 (19 Nov 2018 06:03) (137/83 - 148/82)  BP(mean): --  ABP: --  ABP(mean): --  RR: 18 (19 Nov 2018 06:03) (18 - 20)  SpO2: 96% (19 Nov 2018 06:03) (96% - 99%)      Vital Signs Last 24 Hrs  T(C): 37.3 (19 Nov 2018 06:03), Max: 37.4 (19 Nov 2018 01:36)  T(F): 99.2 (19 Nov 2018 06:03), Max: 99.4 (19 Nov 2018 01:36)  HR: 93 (19 Nov 2018 06:03) (88 - 94)  BP: 144/79 (19 Nov 2018 06:03) (137/83 - 148/82)  BP(mean): --  RR: 18 (19 Nov 2018 06:03) (18 - 20)  SpO2: 96% (19 Nov 2018 06:03) (96% - 99%)                          9.3    19.17 )-----------( 385      ( 19 Nov 2018 05:10 )             29.1       LIVER FUNCTIONS - ( 18 Nov 2018 06:19 )  Alb: 2.6 g/dL / Pro: 6.5 g/dL / ALK PHOS: 74 u/L / ALT: 12 u/L / AST: 14 u/L / GGT: x             PT/INR - ( 19 Nov 2018 05:10 )   PT: 13.7 SEC;   INR: 1.20          PTT - ( 19 Nov 2018 05:10 )  PTT:59.4 SEC      COMMENTS/PLAN:   Pt. complaining of pain 10/10 on left foot. Pt. describes pain as sharp shooting burning pain that comes and goes. Nothing has seemed to relieve pain and pt. is unable to bear weight on left foot.     Plan: Add gabapentin 200mg TID, give first dose stat. Consider adding Tizanidine 1mg Q6hrs PRN for muscle spasm/pain. Consider adding a lidoderm patch to left foot. Consider a vascular consult if not already ordered.

## 2018-11-19 NOTE — PROGRESS NOTE ADULT - ASSESSMENT
69 yo F w pmhx of T2DM, HTN presenting with 1 month of LLE swelling and discoloration admitted for cellulitis      Problem/Recommendation - 1:  Problem: Cellulitis of foot, left. Recommendation: - Pt meeting sepsis criteria on admission; sepsis now resolved  - ID appreciated, c/w abx per ID  - Appreciate podiatry consult  - tylenol PRN for fever     Problem/Recommendation - 2:  ·  Problem: HTN (hypertension).  Recommendation: BP well controlled  c/w home med of lisinopril.      Problem/Recommendation - 3:  ·  Problem: DM (diabetes mellitus).  Recommendation: Blood glucose well controlled  A1C 6.3  C/w sliding scale insulin.      Problem/Recommendation - 4:  ·  Problem: Microcytic anemia.  Recommendation: likely KRIS, outpt f/u     Problem/Recommendation - 5:  ·  Problem: Ischemia of LLE - c/w Heparin gtt for now, vasc to comment on need to continue AC long term, ? switch to NOAC  Rheum and Heme appreciated, await vasculitis w/u    Adrenal incidenteloma - await MRI, surg appreciated

## 2018-11-19 NOTE — PROGRESS NOTE ADULT - SUBJECTIVE AND OBJECTIVE BOX
CHIEF COMPLAINT:Patient is a 68y old  Female who presents with a chief complaint of Left foot cellulitis (14 Nov 2018 18:53)    	  Interval history: no acute events      Allergies:  No Known Allergies      PAST MEDICAL & SURGICAL HISTORY:  DM (diabetes mellitus)  HTN (hypertension)  Breast CA  No significant past surgical history      FAMILY HISTORY:  No pertinent family history in first degree relatives      REVIEW OF SYSTEMS:  CONSTITUTIONAL: No fever, weight loss, or fatigue  EYES: No eye pain, visual disturbances, or discharge  NECK: No pain or stiffness  RESPIRATORY: No cough or wheezing, no shortness of breath  CARDIOVASCULAR: No chest pain, palpitations, dizziness, or leg swelling  GASTROINTESTINAL: No abdominal or epigastric pain. No nausea, vomiting, diarrhea or constipation  GENITOURINARY: No dysuria, urinary frequency or urgency, no hematuria  NEUROLOGICAL: No headaches, memory loss, loss of strength, numbness, or tremors  SKIN: No itching, burning, rashes, or lesions   MUSCULOSKELETAL: No joint pain or swelling; No muscle, back, or extremity pain      Medications:  MEDICATIONS  (STANDING):  ceFAZolin   IVPB 1000 milliGRAM(s) IV Intermittent every 8 hours  dextrose 5% + sodium chloride 0.45%. 1000 milliLiter(s) (75 mL/Hr) IV Continuous <Continuous>  dextrose 5%. 1000 milliLiter(s) (50 mL/Hr) IV Continuous <Continuous>  dextrose 50% Injectable 12.5 Gram(s) IV Push once  heparin  Infusion 1500 Unit(s)/Hr (17 mL/Hr) IV Continuous <Continuous>  insulin lispro (HumaLOG) corrective regimen sliding scale   SubCutaneous three times a day before meals  lisinopril 10 milliGRAM(s) Oral daily    MEDICATIONS  (PRN):  acetaminophen   Tablet .. 975 milliGRAM(s) Oral every 6 hours PRN Mild Pain (1 - 3)  dextrose 40% Gel 15 Gram(s) Oral once PRN Blood Glucose LESS THAN 70 milliGRAM(s)/deciliter  glucagon  Injectable 1 milliGRAM(s) IntraMuscular once PRN Glucose LESS THAN 70 milligrams/deciliter  oxyCODONE    IR 10 milliGRAM(s) Oral every 6 hours PRN moderate and severe pain    	    PHYSICAL EXAM:  T(C): 37.9 (11-19-18 @ 14:54), Max: 37.9 (11-19-18 @ 14:54)  HR: 94 (11-19-18 @ 14:54) (88 - 94)  BP: 135/77 (11-19-18 @ 14:54) (135/77 - 148/82)  RR: 17 (11-19-18 @ 14:54) (17 - 18)  SpO2: 97% (11-19-18 @ 14:54) (96% - 98%)  Wt(kg): --  I&O's Summary    18 Nov 2018 07:01  -  19 Nov 2018 07:00  --------------------------------------------------------  IN: 1170 mL / OUT: 0 mL / NET: 1170 mL      Appearance: Normal	  HEENT:   NCAT, PERRL, EOMI	  LABS:	 	    CARDIAC MARKERS:                                9.3    19.17 )-----------( 385      ( 19 Nov 2018 05:10 )             29.1     11-19    135  |  98  |  8   ----------------------------<  130<H>  4.0   |  25  |  0.85    Ca    10.0      19 Nov 2018 05:10  Phos  2.5     11-18  Mg     1.6     11-18    TPro  6.5  /  Alb  2.6<L>  /  TBili  0.2  /  DBili  x   /  AST  14  /  ALT  12  /  AlkPhos  74  11-18    proBNP:   Lipid Profile:   HgA1c:   TSH:     	        Lymphatic: No lymphadenopathy  Cardiovascular: Normal S1 S2, RRR  Respiratory: Lungs clear to auscultation BL  Psychiatry: A & O x 3, Mood & affect appropriate  Gastrointestinal:  Soft, Non-tender, + BS  Skin: No rashes, No ecchymoses, No cyanosis	  Neurologic: Non-focal  Extremities: Normal range of motion, No clubbing, cyanosis or edema

## 2018-11-19 NOTE — PROGRESS NOTE ADULT - ASSESSMENT
68F with DM, HTN, weight loss, admitted on 11/14/18 with left foot pain of several months duration. Evaluation did not reveal arterial insufficiency. Despite fever and leukocytosis, she appears markedly non toxic.    fever  leukocytosis, persistent.   left adrenal nodule  monoarticular arthritis left ankle  Elevated ESR/ CRP  Procalcitonin 0.11    Antibiotics  Cefazolin 11/13; 11/16->  Zosyn  11/14  Vanco 11/14     Differential diagnosis included APLS vs vasculitis vs crystal arthropathy vs septic arthritis   Given chronicity of symptoms and not much improvement with antibiotics, suggestive of non infectious etiology. Pt non toxic appearing.       Suggest:  Continue Cefazolin  Throat cx sent.   Repeat blood cultures NTD.   Vascular following - ischemic changes stable  Heme and Rheum following - APLS labs sent

## 2018-11-19 NOTE — PROGRESS NOTE ADULT - SUBJECTIVE AND OBJECTIVE BOX
INTERVAL HPI/OVERNIGHT EVENTS:  Patient S&E at bedside.  Continues to have pain in her left foot. Left foot continues to have discoloration.   Patient denies fever, chills, dizziness, weakness, CP, palpitations, SOB, cough, N/V/D/C, dysuria, changes in bowel movements, LE edema.    VITAL SIGNS:  T(F): 100.2 (11-19-18 @ 14:54)  HR: 94 (11-19-18 @ 14:54)  BP: 135/77 (11-19-18 @ 14:54)  RR: 17 (11-19-18 @ 14:54)  SpO2: 97% (11-19-18 @ 14:54)  Wt(kg): --    PHYSICAL EXAM:    Constitutional: WDWN, NAD,   Eyes: PERRL, EOMI, sclera non-icteric  Neck: supple, no masses, no JVD  Respiratory: CTA b/l, good air entry b/l, BS normal  Cardiovascular: RRR, normal S1S2, no M/R/G  Gastrointestinal: soft, NTND,  no HSM  Extremities: WWP, no c/c/e  Neurological: AAOx3  Skin: Normal temperature    MEDICATIONS  (STANDING):  ceFAZolin   IVPB 1000 milliGRAM(s) IV Intermittent every 8 hours  dextrose 5% + sodium chloride 0.45%. 1000 milliLiter(s) (75 mL/Hr) IV Continuous <Continuous>  dextrose 5%. 1000 milliLiter(s) (50 mL/Hr) IV Continuous <Continuous>  dextrose 50% Injectable 12.5 Gram(s) IV Push once  heparin  Infusion 1500 Unit(s)/Hr (17 mL/Hr) IV Continuous <Continuous>  insulin lispro (HumaLOG) corrective regimen sliding scale   SubCutaneous three times a day before meals  lisinopril 10 milliGRAM(s) Oral daily    MEDICATIONS  (PRN):  acetaminophen   Tablet .. 975 milliGRAM(s) Oral every 6 hours PRN Mild Pain (1 - 3)  dextrose 40% Gel 15 Gram(s) Oral once PRN Blood Glucose LESS THAN 70 milliGRAM(s)/deciliter  glucagon  Injectable 1 milliGRAM(s) IntraMuscular once PRN Glucose LESS THAN 70 milligrams/deciliter  oxyCODONE    IR 10 milliGRAM(s) Oral every 6 hours PRN moderate and severe pain      Allergies    No Known Allergies    Intolerances        LABS:                        9.3    19.17 )-----------( 385      ( 19 Nov 2018 05:10 )             29.1     11-19    135  |  98  |  8   ----------------------------<  130<H>  4.0   |  25  |  0.85    Ca    10.0      19 Nov 2018 05:10  Phos  2.5     11-18  Mg     1.6     11-18    TPro  6.5  /  Alb  2.6<L>  /  TBili  0.2  /  DBili  x   /  AST  14  /  ALT  12  /  AlkPhos  74  11-18    PT/INR - ( 19 Nov 2018 05:10 )   PT: 13.7 SEC;   INR: 1.20          PTT - ( 19 Nov 2018 05:10 )  PTT:59.4 SEC      RADIOLOGY & ADDITIONAL TESTS:  Studies reviewed.

## 2018-11-19 NOTE — PROGRESS NOTE ADULT - ASSESSMENT
67 yo woman with a hx of DM, HTN and breast CA s/p lumpectomy presented with left foot pain, swelling and redness. CTA obtained which showed patent vessels to ankles. Currently on medicine service undergoing work up for hypercoaguability/vasculitis    - Patient has patent vessels to ankle and palpable PT - is currently on anticoagulation per Heme/onc for possible hypercoagulability. No contraindication from vascular surgery perspective re: Heparin gtt, however would defer to Hematology regarding continuation of hepatin gtt vs. transition to other anticoagulation.   - Discussed with primary team    INO Torres PGY2  Vascular surgery  [id number]

## 2018-11-19 NOTE — PROGRESS NOTE ADULT - SUBJECTIVE AND OBJECTIVE BOX
VASCULAR SURGERY PROGRESS NOTE      Subjective:  No acute events overnight. Therapeutic on Hep gtt        Objective:    PE:  GEN: NAD, resting quietly  RESP: symmetric chest rise bilaterally, no increased WOB  CV: tachycardic, irregular; RLE dopplerable DP and PT signals; LLE Palpable PT  ABD: soft, NTND  EXTR: LLE cool compared to right, no ulcers or wounds; left foot edematous and TTP to palpation, no cyanosis    Vital Signs Last 24 Hrs  T(C): 37.3 (19 Nov 2018 06:03), Max: 37.4 (19 Nov 2018 01:36)  T(F): 99.2 (19 Nov 2018 06:03), Max: 99.4 (19 Nov 2018 01:36)  HR: 93 (19 Nov 2018 06:03) (88 - 94)  BP: 144/79 (19 Nov 2018 06:03) (137/83 - 148/82)  BP(mean): --  RR: 18 (19 Nov 2018 06:03) (18 - 20)  SpO2: 96% (19 Nov 2018 06:03) (96% - 99%)    I&O's Detail    18 Nov 2018 07:01  -  19 Nov 2018 07:00  --------------------------------------------------------  IN:    dextrose 5% + sodium chloride 0.45%.: 750 mL    heparin Infusion: 170 mL    IV PiggyBack: 50 mL    Oral Fluid: 200 mL  Total IN: 1170 mL    OUT:  Total OUT: 0 mL    Total NET: 1170 mL          Daily     Daily     MEDICATIONS  (STANDING):  ceFAZolin   IVPB 1000 milliGRAM(s) IV Intermittent every 8 hours  dextrose 5% + sodium chloride 0.45%. 1000 milliLiter(s) (75 mL/Hr) IV Continuous <Continuous>  dextrose 5%. 1000 milliLiter(s) (50 mL/Hr) IV Continuous <Continuous>  dextrose 50% Injectable 12.5 Gram(s) IV Push once  heparin  Infusion 1500 Unit(s)/Hr (17 mL/Hr) IV Continuous <Continuous>  insulin lispro (HumaLOG) corrective regimen sliding scale   SubCutaneous three times a day before meals  lisinopril 10 milliGRAM(s) Oral daily    MEDICATIONS  (PRN):  acetaminophen   Tablet .. 975 milliGRAM(s) Oral every 6 hours PRN Mild Pain (1 - 3)  dextrose 40% Gel 15 Gram(s) Oral once PRN Blood Glucose LESS THAN 70 milliGRAM(s)/deciliter  glucagon  Injectable 1 milliGRAM(s) IntraMuscular once PRN Glucose LESS THAN 70 milligrams/deciliter  oxyCODONE    IR 5 milliGRAM(s) Oral every 6 hours PRN Moderate Pain (4 - 6)      LABS:                        9.3    19.17 )-----------( 385      ( 19 Nov 2018 05:10 )             29.1     11-19    135  |  98  |  8   ----------------------------<  130<H>  4.0   |  25  |  0.85    Ca    10.0      19 Nov 2018 05:10  Phos  2.5     11-18  Mg     1.6     11-18    TPro  6.5  /  Alb  2.6<L>  /  TBili  0.2  /  DBili  x   /  AST  14  /  ALT  12  /  AlkPhos  74  11-18    PT/INR - ( 19 Nov 2018 05:10 )   PT: 13.7 SEC;   INR: 1.20          PTT - ( 19 Nov 2018 05:10 )  PTT:59.4 SEC      RADIOLOGY & ADDITIONAL STUDIES:  CT Angio Abd Aorta w/run-off w/ IV Cont (11.14.18 @ 09:03)  Normal caliber and patent abdominal aorta.    Three vessel run off to both lower extremities. Left lower extremity   edema without subcutaneous emphysema.    Indeterminate left adrenal gland lesion measuring 3.0 cm. This can be   further evaluated with dedicated MRI adrenal gland protocol.

## 2018-11-19 NOTE — CONSULT NOTE ADULT - ASSESSMENT
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 Repeat skin biopsy unlikely to yield additional information.  - Unclear etiology, though favor endothelial injury 2/2 chronic tobacco use  - Will f/u APLS work-up, please also send: cryoglobulins, C3/C4, GINA  - Please consider more sensitive imaging with angiography or MRA of LLE, given pathology lies in the small arterioles (recommendation similarly suggested in Rheum initial consult note)    Derm will continue to follow. Please call [phone] with questions. 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 Repeat skin biopsy unlikely to yield additional information.  - Unclear etiology at this time, though favor endothelial injury 2/2 chronic tobacco use  - Will f/u APLS work-up, please also send: cryoglobulins, C3/C4, GINA  - Please consider more sensitive imaging with angiography or MRA of LLE, given pathology lies in the small arterioles (recommendation similarly suggested in Rheum initial consult note)    Derm will continue to follow. Please call [phone] with questions.

## 2018-11-19 NOTE — PROGRESS NOTE ADULT - ASSESSMENT
68 woman with a hx of DM, HTN and remote hx of breast cancer s/p lumpectomy and chemoradiation admitted for cellulitis. Heme consulted for hypercoagulable workup and anticoagulation.    # LE swelling, pain and discoloration:  -CT angio reviewed, no evidence of acute clot. Venous duplex is negative for thrombosis.   -Unclear cause of pain/swelling and discoloration, but likely related to underlying vasculitis. Rheumatology on board, w/u pending.   -Given the presentation, change in color of the foot/pain and swelling, will recommend continuing heparin gtt until Rheum w/u is complete and diagnosis is established.     #Anemia:  -With low MCV. Anemia w/u c/w Anemia of inflammation, however patient with low MCV. Please send for Hb electrophoresis.    D/W team and patient.

## 2018-11-19 NOTE — CHART NOTE - NSCHARTNOTEFT_GEN_A_CORE
rheum recs- Would consider derm eval to assess if pt benefits from repeat skin bx with superficial and deep sections, with immunostaining    therefore derm consult called- will f/u recs

## 2018-11-20 ENCOUNTER — APPOINTMENT (OUTPATIENT)
Dept: VASCULAR SURGERY | Facility: CLINIC | Age: 68
End: 2018-11-20

## 2018-11-20 LAB
APTT BLD: 52.7 SEC — HIGH (ref 27.5–36.3)
BUN SERPL-MCNC: 8 MG/DL — SIGNIFICANT CHANGE UP (ref 7–23)
CALCIUM SERPL-MCNC: 9.9 MG/DL — SIGNIFICANT CHANGE UP (ref 8.4–10.5)
CHLORIDE SERPL-SCNC: 98 MMOL/L — SIGNIFICANT CHANGE UP (ref 98–107)
CO2 SERPL-SCNC: 29 MMOL/L — SIGNIFICANT CHANGE UP (ref 22–31)
CREAT SERPL-MCNC: 0.85 MG/DL — SIGNIFICANT CHANGE UP (ref 0.5–1.3)
GLUCOSE BLDC GLUCOMTR-MCNC: 110 MG/DL — HIGH (ref 70–99)
GLUCOSE BLDC GLUCOMTR-MCNC: 134 MG/DL — HIGH (ref 70–99)
GLUCOSE BLDC GLUCOMTR-MCNC: 149 MG/DL — HIGH (ref 70–99)
GLUCOSE BLDC GLUCOMTR-MCNC: 154 MG/DL — HIGH (ref 70–99)
GLUCOSE SERPL-MCNC: 105 MG/DL — HIGH (ref 70–99)
HCT VFR BLD CALC: 28.8 % — LOW (ref 34.5–45)
HGB BLD-MCNC: 8.8 G/DL — LOW (ref 11.5–15.5)
HVA UR-MCNC: 3.6 — SIGNIFICANT CHANGE UP
MCHC RBC-ENTMCNC: 23.8 PG — LOW (ref 27–34)
MCHC RBC-ENTMCNC: 30.6 % — LOW (ref 32–36)
MCV RBC AUTO: 77.8 FL — LOW (ref 80–100)
METANEPH 24H UR-MRATE: 131 — SIGNIFICANT CHANGE UP
METANEPH UR-MCNC: 1200 ML — SIGNIFICANT CHANGE UP
METANEPH UR-MCNC: 131 — SIGNIFICANT CHANGE UP
METANEPH UR-MCNC: SIGNIFICANT CHANGE UP
METANEPHS 24H UR-MRATE: 1200 ML — SIGNIFICANT CHANGE UP
METANEPHS 24H UR-MRATE: 497 — SIGNIFICANT CHANGE UP
METANEPHS UR-MCNC: 497 — SIGNIFICANT CHANGE UP
NORMETANEPHRINE 24H UR-MRATE: 366 — SIGNIFICANT CHANGE UP
NORMETANEPHRINE UR-SCNC: 366 — SIGNIFICANT CHANGE UP
NRBC # FLD: 0 — SIGNIFICANT CHANGE UP
PLATELET # BLD AUTO: 381 K/UL — SIGNIFICANT CHANGE UP (ref 150–400)
PMV BLD: 10.6 FL — SIGNIFICANT CHANGE UP (ref 7–13)
POTASSIUM SERPL-MCNC: 3.6 MMOL/L — SIGNIFICANT CHANGE UP (ref 3.5–5.3)
POTASSIUM SERPL-SCNC: 3.6 MMOL/L — SIGNIFICANT CHANGE UP (ref 3.5–5.3)
RBC # BLD: 3.7 M/UL — LOW (ref 3.8–5.2)
RBC # FLD: 17.2 % — HIGH (ref 10.3–14.5)
SODIUM SERPL-SCNC: 137 MMOL/L — SIGNIFICANT CHANGE UP (ref 135–145)
SPECIMEN VOL 24H UR: 1200 ML — SIGNIFICANT CHANGE UP
SPECIMEN VOL 24H UR: 497 — SIGNIFICANT CHANGE UP
VMA/CREAT UR: 5.6 — SIGNIFICANT CHANGE UP
WBC # BLD: 16.85 K/UL — HIGH (ref 3.8–10.5)
WBC # FLD AUTO: 16.85 K/UL — HIGH (ref 3.8–10.5)

## 2018-11-20 PROCEDURE — 99232 SBSQ HOSP IP/OBS MODERATE 35: CPT

## 2018-11-20 PROCEDURE — 99232 SBSQ HOSP IP/OBS MODERATE 35: CPT | Mod: GC

## 2018-11-20 RX ORDER — LIDOCAINE 4 G/100G
1 CREAM TOPICAL DAILY
Qty: 0 | Refills: 0 | Status: DISCONTINUED | OUTPATIENT
Start: 2018-11-20 | End: 2018-12-14

## 2018-11-20 RX ORDER — GABAPENTIN 400 MG/1
200 CAPSULE ORAL THREE TIMES A DAY
Qty: 0 | Refills: 0 | Status: DISCONTINUED | OUTPATIENT
Start: 2018-11-20 | End: 2018-12-14

## 2018-11-20 RX ADMIN — Medication 100 MILLIGRAM(S): at 20:56

## 2018-11-20 RX ADMIN — HEPARIN SODIUM 17 UNIT(S)/HR: 5000 INJECTION INTRAVENOUS; SUBCUTANEOUS at 21:12

## 2018-11-20 RX ADMIN — SODIUM CHLORIDE 75 MILLILITER(S): 9 INJECTION, SOLUTION INTRAVENOUS at 10:11

## 2018-11-20 RX ADMIN — GABAPENTIN 200 MILLIGRAM(S): 400 CAPSULE ORAL at 10:10

## 2018-11-20 RX ADMIN — GABAPENTIN 200 MILLIGRAM(S): 400 CAPSULE ORAL at 17:23

## 2018-11-20 RX ADMIN — Medication 100 MILLIGRAM(S): at 12:41

## 2018-11-20 RX ADMIN — LISINOPRIL 10 MILLIGRAM(S): 2.5 TABLET ORAL at 06:04

## 2018-11-20 RX ADMIN — Medication 100 MILLIGRAM(S): at 06:04

## 2018-11-20 RX ADMIN — LIDOCAINE 1 PATCH: 4 CREAM TOPICAL at 12:41

## 2018-11-20 NOTE — PROGRESS NOTE ADULT - SUBJECTIVE AND OBJECTIVE BOX
CHIEF COMPLAINT:Patient is a 68y old  Female who presents with a chief complaint of Left foot cellulitis (14 Nov 2018 18:53)    	  Interval history: no acute events      Allergies:  No Known Allergies      PAST MEDICAL & SURGICAL HISTORY:  DM (diabetes mellitus)  HTN (hypertension)  Breast CA  No significant past surgical history      FAMILY HISTORY:  No pertinent family history in first degree relatives      REVIEW OF SYSTEMS:  CONSTITUTIONAL: No fever, weight loss, or fatigue  EYES: No eye pain, visual disturbances, or discharge  NECK: No pain or stiffness  RESPIRATORY: No cough or wheezing, no shortness of breath  CARDIOVASCULAR: No chest pain, palpitations, dizziness, or leg swelling  GASTROINTESTINAL: No abdominal or epigastric pain. No nausea, vomiting, diarrhea or constipation  GENITOURINARY: No dysuria, urinary frequency or urgency, no hematuria  NEUROLOGICAL: No headaches, memory loss, loss of strength, numbness, or tremors  SKIN: No itching, burning, rashes, or lesions   MUSCULOSKELETAL: No joint pain or swelling; No muscle, back, or extremity pain      Medications:  MEDICATIONS  (STANDING):  ceFAZolin   IVPB 1000 milliGRAM(s) IV Intermittent every 8 hours  dextrose 5% + sodium chloride 0.45%. 1000 milliLiter(s) (75 mL/Hr) IV Continuous <Continuous>  dextrose 5%. 1000 milliLiter(s) (50 mL/Hr) IV Continuous <Continuous>  dextrose 50% Injectable 12.5 Gram(s) IV Push once  gabapentin 200 milliGRAM(s) Oral three times a day  heparin  Infusion 1500 Unit(s)/Hr (17 mL/Hr) IV Continuous <Continuous>  insulin lispro (HumaLOG) corrective regimen sliding scale   SubCutaneous three times a day before meals  lidocaine   Patch 1 Patch Transdermal daily  lisinopril 10 milliGRAM(s) Oral daily    MEDICATIONS  (PRN):  acetaminophen   Tablet .. 975 milliGRAM(s) Oral every 6 hours PRN Mild Pain (1 - 3)  dextrose 40% Gel 15 Gram(s) Oral once PRN Blood Glucose LESS THAN 70 milliGRAM(s)/deciliter  glucagon  Injectable 1 milliGRAM(s) IntraMuscular once PRN Glucose LESS THAN 70 milligrams/deciliter  oxyCODONE    IR 10 milliGRAM(s) Oral every 6 hours PRN moderate and severe pain    	    PHYSICAL EXAM:  T(C): 37.5 (11-20-18 @ 09:43), Max: 37.9 (11-19-18 @ 14:54)  HR: 100 (11-20-18 @ 09:43) (85 - 105)  BP: 137/78 (11-20-18 @ 09:43) (127/95 - 149/94)  RR: 19 (11-20-18 @ 09:43) (17 - 19)  SpO2: 98% (11-20-18 @ 09:43) (96% - 98%)  Wt(kg): --  I&O's Summary    19 Nov 2018 07:01  -  20 Nov 2018 07:00  --------------------------------------------------------  IN: 0 mL / OUT: 50 mL / NET: -50 mL    20 Nov 2018 07:01  -  20 Nov 2018 10:41  --------------------------------------------------------  IN: 0 mL / OUT: 200 mL / NET: -200 mL    Lymphatic: No lymphadenopathy  Cardiovascular: Normal S1 S2, RRR  Respiratory: Lungs clear to auscultation BL  Psychiatry: A & O x 3, Mood & affect appropriate  Gastrointestinal:  Soft, Non-tender, + BS  Skin: No rashes, No ecchymoses, No cyanosis	  Neurologic: Non-focal  Extremities: L ankle edema and tenderness unchanged    LABS:	 	    CARDIAC MARKERS:                                8.8    16.85 )-----------( 381      ( 20 Nov 2018 05:20 )             28.8     11-20    137  |  98  |  8   ----------------------------<  105<H>  3.6   |  29  |  0.85    Ca    9.9      20 Nov 2018 06:55      proBNP:   Lipid Profile:   HgA1c:   TSH:

## 2018-11-20 NOTE — PROGRESS NOTE ADULT - ASSESSMENT
67 yo woman with a hx of DM, HTN and breast CA s/p lumpectomy presented with left foot pain, swelling and redness. CTA obtained which showed patent vessels to ankles. Currently on medicine service undergoing work up for hypercoaguability/vasculitis    - Patient has patent vessels to ankle and palpable PT - is currently on anticoagulation per Heme/onc for possible hypercoagulability. No contraindication from vascular surgery perspective re: Heparin gtt, however would defer to Hematology regarding continuation of hepatin gtt vs. transition to other anticoagulation.     Vascular surgery  [id number]

## 2018-11-20 NOTE — PROGRESS NOTE ADULT - ASSESSMENT
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   APLS in differential given w complex deposition in intima; however no thrombi on bx   FMD in differential, given histopathology of FMD (10% of FMD is seen in LE) has shown collagen deposition in intima. There is also case reports of distal vasculature involvement.   Given endarteritis obliterans, syphilis should be in differential   Pt does have significant smoking hx, which raises the question if this is Burgers', however, not thrombi on bx  Challenging diagnosis    Recommend  Please ensure collection of APLS serologies  follow up cryo, complement levels  Please obtain MRI and MRA of LLE to assess for any vascular etiologies or things like CRPS  Would continue anticoagulation until these studies have been obtained    Cristian Lagos  Rheumatology Fellow PGY IV 67 y/o F presenting w several months of L ankle/foot swelling, w recurrent low grade fevers  On outpat bx results, 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   APLS in differential given w complex deposition in intima; however no thrombi on bx   FMD in differential, given histopathology of FMD (10% of FMD is seen in LE) has shown collagen deposition in intima. There is also case reports of distal vasculature involvement.   Another possible diagnosis would be CRPS and that could be seen on 3 phase bone scan.  If the MRI/MRA is negative could consider a 3 phase bone scan.   Pt does have significant smoking hx, which raises the question if this is Burgers', however, not thrombi on bx  Challenging diagnosis    Recommend  Please ensure collection of APLS serologies  follow up cryo, complement levels  Please obtain MRI and MRA of LLE to assess for any vascular etiologies.  If MRI/MRA is negative consider 3 phase bone scan to look for CRPS  Would continue anticoagulation until these studies have been obtained    Cristian Lagos  Rheumatology Fellow PGY IV

## 2018-11-20 NOTE — PROGRESS NOTE ADULT - SUBJECTIVE AND OBJECTIVE BOX
Surgery C-Team Daily Progress Note     RENATA DIXON | [id number]    SUBJECTIVE / 24H EVENTS  Patient seen and examined. No acute events overnight.     OBJECTIVE:    VITAL SIGNS:  T(C): 36.9 (11-20-18 @ 01:16), Max: 37.9 (11-19-18 @ 14:54)  HR: 85 (11-20-18 @ 01:16) (85 - 105)  BP: 127/95 (11-20-18 @ 01:16) (127/95 - 149/94)  RR: 18 (11-20-18 @ 01:16) (17 - 18)  SpO2: 97% (11-20-18 @ 01:16) (96% - 98%)    POCT Blood Glucose.: 144 mg/dL (11-19-18 @ 22:13)  POCT Blood Glucose.: 126 mg/dL (11-19-18 @ 17:33)  POCT Blood Glucose.: 140 mg/dL (11-19-18 @ 12:43)      PHYSICAL EXAM:  GEN: NAD, resting quietly  RESP: symmetric chest rise bilaterally, no increased WOB  CV: tachycardic, irregular; RLE dopplerable DP and PT signals; LLE Palpable PT  ABD: soft, NTND  EXTR: LLE cool compared to right, no ulcers or wounds; left foot edematous and TTP to palpation, no cyanosis    11-18-18 @ 07:01  -  11-19-18 @ 07:00  --------------------------------------------------------  IN:    dextrose 5% + sodium chloride 0.45%.: 750 mL    heparin Infusion: 170 mL    IV PiggyBack: 50 mL    Oral Fluid: 200 mL  Total IN: 1170 mL    OUT:  Total OUT: 0 mL    Total NET: 1170 mL      11-19-18 @ 07:01  -  11-20-18 @ 01:53  --------------------------------------------------------  IN:  Total IN: 0 mL    OUT:    Voided: 50 mL  Total OUT: 50 mL    Total NET: -50 mL      LAB VALUES:  11-19    135  |  98  |  8   ----------------------------<  130<H>  4.0   |  25  |  0.85    Ca    10.0      19 Nov 2018 05:10  Phos  2.5     11-18  Mg     1.6     11-18    TPro  6.5  /  Alb  2.6<L>  /  TBili  0.2  /  DBili  x   /  AST  14  /  ALT  12  /  AlkPhos  74  11-18                               9.3    19.17 )-----------( 385      ( 19 Nov 2018 05:10 )             29.1     LIVER FUNCTIONS - ( 18 Nov 2018 06:19 )  Alb: 2.6 g/dL / Pro: 6.5 g/dL / ALK PHOS: 74 u/L / ALT: 12 u/L / AST: 14 u/L / GGT: x           PT/INR - ( 19 Nov 2018 05:10 )   PT: 13.7 SEC;   INR: 1.20     PTT - ( 19 Nov 2018 05:10 )  PTT:59.4 SEC      MICROBIOLOGY:    Culture - Blood (collected 18 Nov 2018 06:45)  Source: BLOOD VENOUS  Preliminary Report (19 Nov 2018 06:44):    NO ORGANISMS ISOLATED    NO ORGANISMS ISOLATED AT 24 HOURS    Culture - Blood (collected 18 Nov 2018 06:45)  Source: BLOOD PERIPHERAL  Preliminary Report (19 Nov 2018 06:44):    NO ORGANISMS ISOLATED    NO ORGANISMS ISOLATED AT 24 HOURS      RADIOLOGY:    < from: US Duplex Venous Lower Ext Ltd, Left (11.19.18 @ 11:54) >  IMPRESSION:     No evidence of left lower extremity deep venous thrombosis.    Left Baker's cyst    < end of copied text >      MEDICATIONS  (STANDING):  ceFAZolin   IVPB 1000 milliGRAM(s) IV Intermittent every 8 hours  dextrose 5% + sodium chloride 0.45%. 1000 milliLiter(s) (75 mL/Hr) IV Continuous <Continuous>  dextrose 5%. 1000 milliLiter(s) (50 mL/Hr) IV Continuous <Continuous>  dextrose 50% Injectable 12.5 Gram(s) IV Push once  heparin  Infusion 1500 Unit(s)/Hr (17 mL/Hr) IV Continuous <Continuous>  insulin lispro (HumaLOG) corrective regimen sliding scale   SubCutaneous three times a day before meals  lisinopril 10 milliGRAM(s) Oral daily    MEDICATIONS  (PRN):  acetaminophen   Tablet .. 975 milliGRAM(s) Oral every 6 hours PRN Mild Pain (1 - 3)  dextrose 40% Gel 15 Gram(s) Oral once PRN Blood Glucose LESS THAN 70 milliGRAM(s)/deciliter  glucagon  Injectable 1 milliGRAM(s) IntraMuscular once PRN Glucose LESS THAN 70 milligrams/deciliter  oxyCODONE    IR 10 milliGRAM(s) Oral every 6 hours PRN moderate and severe pain

## 2018-11-20 NOTE — PROGRESS NOTE ADULT - SUBJECTIVE AND OBJECTIVE BOX
68y old  Female who presents with a chief complaint of Left foot cellulitis (20 Nov 2018 10:40)      Interval history:  Intermittent low grade temps, throat pain better, pain in the lt foot better controlled.      No Known Allergies    Antimicrobials:    ceFAZolin   IVPB 1000 milliGRAM(s) IV Intermittent every 8 hours    REVIEW OF SYSTEMS:  No chest pain   No cough, no SOB  No N/V/D, no abdominal pain  No dysuria   No rash.     Vital Signs Last 24 Hrs  T(C): 37.9 (11-20-18 @ 14:00), Max: 37.9 (11-19-18 @ 20:18)  T(F): 100.2 (11-20-18 @ 14:00), Max: 100.3 (11-19-18 @ 20:18)  HR: 101 (11-20-18 @ 14:00) (85 - 105)  BP: 147/87 (11-20-18 @ 14:00) (127/95 - 149/94)  RR: 16 (11-20-18 @ 14:00) (16 - 19)  SpO2: 99% (11-20-18 @ 14:00) (96% - 99%)      PHYSICAL EXAM:  Patient in no acute distress. AAOX3.  + pharyngeal erythema.   Cardiovascular: S1S2 normal, tachycardic.   Lungs: + air entry B/L lung fields.  Gastrointestinal: soft, nontender, nondistended.  Extremities: lt foot edema with cold toes with cyanosis, ankle swelling with ecchymosis and warmth.   IV sites not inflamed.                             8.8    16.85 )-----------( 381      ( 20 Nov 2018 05:20 )             28.8   11-20    137  |  98  |  8   ----------------------------<  105<H>  3.6   |  29  |  0.85    Ca    9.9      20 Nov 2018 06:55          Culture - Blood (collected 18 Nov 2018 06:45)  Source: BLOOD VENOUS  Preliminary Report (20 Nov 2018 06:44):    NO ORGANISMS ISOLATED    NO ORGANISMS ISOLATED AT 48 HRS.    Culture - Blood (collected 18 Nov 2018 06:45)  Source: BLOOD PERIPHERAL  Preliminary Report (20 Nov 2018 06:44):    NO ORGANISMS ISOLATED    NO ORGANISMS ISOLATED AT 48 HRS.

## 2018-11-20 NOTE — PROGRESS NOTE ADULT - ATTENDING COMMENTS
Seen ex'ed   Pain a little better  Low gr fever, wbc still elvted  HR tachy better but still up to low 100's  No other central/periph isch complication  L LE: foot resid ischemic changes as before (coolness, cyanotic). +PT pulse.  No gross infection.  Skin intact    A/P:  L foot ischemia.  Atypical presentation.  Good perf to ankle.  No role for vasc intervention at this time.    Rec:  Cont multispecialty teague  On AC  foot care/protection/podiatry fu  Heme R/O hypercoag  Rheum R/O vasculidities  Cardio for tachy  ID if infection present  Surg onc for adrenal nodule    DW'ed pt/daughter at length incl potential threat to limb/need for major amp if isch does not improve/progresses.

## 2018-11-20 NOTE — PROGRESS NOTE ADULT - ASSESSMENT
69 yo F w pmhx of T2DM, HTN presenting with 1 month of LLE swelling and discoloration admitted for cellulitis      Problem/Recommendation - 1:  Problem: Cellulitis of foot, left. Recommendation: - Pt meeting sepsis criteria on admission; sepsis now resolved  - ID appreciated, c/w abx per ID  - Appreciate podiatry consult  - tylenol PRN for fever     Problem/Recommendation - 2:  ·  Problem: HTN (hypertension).  Recommendation: BP well controlled  c/w home med of lisinopril.      Problem/Recommendation - 3:  ·  Problem: DM (diabetes mellitus).  Recommendation: Blood glucose well controlled  A1C 6.3  C/w sliding scale insulin.      Problem/Recommendation - 4:  ·  Problem: Microcytic anemia.  Recommendation: likely KRIS, outpt f/u     Problem/Recommendation - 5:  ·  Problem: Ischemia of LLE - c/w Heparin gtt for now, await results of vasculitis/APLS w/u  Rheum and Heme appreciated, await vasculitis w/u results  Derm appreciated, no role of repeat skin bx at this time  MRA LLE    Adrenal incidenteloma - await MRI, surg appreciated

## 2018-11-20 NOTE — PROGRESS NOTE ADULT - SUBJECTIVE AND OBJECTIVE BOX
RENATA DIXON  [id number]    INTERVAL HPI/OVERNIGHT EVENTS:    Patient with no acute events overnight, derm saw patient, will not be needing another biopsy. Still on heparin gtt.    MEDICATIONS  (STANDING):  ceFAZolin   IVPB 1000 milliGRAM(s) IV Intermittent every 8 hours  dextrose 5% + sodium chloride 0.45%. 1000 milliLiter(s) (75 mL/Hr) IV Continuous <Continuous>  dextrose 5%. 1000 milliLiter(s) (50 mL/Hr) IV Continuous <Continuous>  dextrose 50% Injectable 12.5 Gram(s) IV Push once  gabapentin 200 milliGRAM(s) Oral three times a day  heparin  Infusion 1500 Unit(s)/Hr (17 mL/Hr) IV Continuous <Continuous>  insulin lispro (HumaLOG) corrective regimen sliding scale   SubCutaneous three times a day before meals  lidocaine   Patch 1 Patch Transdermal daily  lisinopril 10 milliGRAM(s) Oral daily    MEDICATIONS  (PRN):  acetaminophen   Tablet .. 975 milliGRAM(s) Oral every 6 hours PRN Mild Pain (1 - 3)  dextrose 40% Gel 15 Gram(s) Oral once PRN Blood Glucose LESS THAN 70 milliGRAM(s)/deciliter  glucagon  Injectable 1 milliGRAM(s) IntraMuscular once PRN Glucose LESS THAN 70 milligrams/deciliter  oxyCODONE    IR 10 milliGRAM(s) Oral every 6 hours PRN moderate and severe pain      Allergies    No Known Allergies    Intolerances        Review of Systems:   General: No fevers/chills, no fatigue  HEENT: No blurry vision, dysphagia, or odynophagia  CVS: No CP/palpitations  Resp: No SOB/wheezing  GI: No N/V/C/D/abdominal pain  MSK: L ankle swelling and pain, redness and cool  Skin: No new rashes  Neuro: No headaches      Vital Signs Last 24 Hrs  T(C): 37.9 (20 Nov 2018 14:00), Max: 37.9 (19 Nov 2018 20:18)  T(F): 100.2 (20 Nov 2018 14:00), Max: 100.3 (19 Nov 2018 20:18)  HR: 101 (20 Nov 2018 14:00) (85 - 105)  BP: 147/87 (20 Nov 2018 14:00) (127/95 - 149/94)  BP(mean): --  RR: 16 (20 Nov 2018 14:00) (16 - 19)  SpO2: 99% (20 Nov 2018 14:00) (96% - 99%)    Physical Exam:  General: NAD  HEENT: EOMI, MMM  Cardio: +S1/S2, RRR  Resp: CTA b/l  GI: +BS, soft, NT/ND  MSK: L foot swollen up to ankle, cold to touch, dry skin  Neuro: AAOx3  Psych: wnl    LABS:                        8.8    16.85 )-----------( 381      ( 20 Nov 2018 05:20 )             28.8     11-20    137  |  98  |  8   ----------------------------<  105<H>  3.6   |  29  |  0.85    Ca    9.9      20 Nov 2018 06:55      PT/INR - ( 19 Nov 2018 05:10 )   PT: 13.7 SEC;   INR: 1.20          PTT - ( 19 Nov 2018 05:10 )  PTT:59.4 SEC        RADIOLOGY & ADDITIONAL TESTS:

## 2018-11-20 NOTE — CHART NOTE - NSCHARTNOTEFT_GEN_A_CORE
Emailed rheumatology this morning about following up patient, last time rheum saw pt was when they consulted on 11/16/18. Heme deferred to rheum workup to see if hep gtt can be stopped. d/w attending Dr. Ochoa. Still waiting for rheum to follow up and their recs.

## 2018-11-20 NOTE — PROGRESS NOTE ADULT - ASSESSMENT
68F with DM, HTN, weight loss, admitted on 11/14/18 with left foot pain of several months duration. Evaluation did not reveal arterial insufficiency. Despite fever and leukocytosis, she appears markedly non toxic.    fever  leukocytosis, persistent, trending down.   left adrenal nodule  monoarticular arthritis left ankle  Elevated ESR/ CRP  Procalcitonin 0.11    Antibiotics  Cefazolin 11/13; 11/16->  Zosyn  11/14  Vanco 11/14     Differential diagnosis included APLS vs vasculitis   Given chronicity of symptoms and not much improvement with antibiotics, suggestive of non infectious etiology. Pt non toxic appearing.       Suggest:  Continue Cefazolin  Throat cx, if negative, stop abx and observe off.   Repeat blood cultures NTD.   Vascular following - ischemic changes stable  Heme and Rheum following - APLS labs sent  S/p Derm eval, recommend MRI or angiography.

## 2018-11-21 LAB
APTT BLD: 83 SEC — HIGH (ref 27.5–36.3)
BUN SERPL-MCNC: 7 MG/DL — SIGNIFICANT CHANGE UP (ref 7–23)
C3 SERPL-MCNC: 177.6 MG/DL — SIGNIFICANT CHANGE UP (ref 90–180)
C4 SERPL-MCNC: 47.1 MG/DL — HIGH (ref 10–40)
CALCIUM SERPL-MCNC: 9.7 MG/DL — SIGNIFICANT CHANGE UP (ref 8.4–10.5)
CHLORIDE SERPL-SCNC: 98 MMOL/L — SIGNIFICANT CHANGE UP (ref 98–107)
CO2 SERPL-SCNC: 22 MMOL/L — SIGNIFICANT CHANGE UP (ref 22–31)
CREAT SERPL-MCNC: 0.75 MG/DL — SIGNIFICANT CHANGE UP (ref 0.5–1.3)
DOPAMINE - URINE 24 HOUR: 365 UG/24 HR — SIGNIFICANT CHANGE UP (ref 0–510)
DOPAMINE UR-MCNC: 304 UG/L — SIGNIFICANT CHANGE UP
EPINEPH UR-MCNC: 4 UG/L — SIGNIFICANT CHANGE UP
EPINEPHRINE - URINE, 24 HOUR: 5 UG/24 HR — SIGNIFICANT CHANGE UP (ref 0–20)
GLUCOSE BLDC GLUCOMTR-MCNC: 117 MG/DL — HIGH (ref 70–99)
GLUCOSE BLDC GLUCOMTR-MCNC: 120 MG/DL — HIGH (ref 70–99)
GLUCOSE BLDC GLUCOMTR-MCNC: 176 MG/DL — HIGH (ref 70–99)
GLUCOSE BLDC GLUCOMTR-MCNC: 190 MG/DL — HIGH (ref 70–99)
GLUCOSE SERPL-MCNC: 110 MG/DL — HIGH (ref 70–99)
HCT VFR BLD CALC: 29.4 % — LOW (ref 34.5–45)
HGB BLD-MCNC: 9.1 G/DL — LOW (ref 11.5–15.5)
MCHC RBC-ENTMCNC: 24.7 PG — LOW (ref 27–34)
MCHC RBC-ENTMCNC: 31 % — LOW (ref 32–36)
MCV RBC AUTO: 79.7 FL — LOW (ref 80–100)
NOREPINEPH UR-MCNC: 25 UG/L — SIGNIFICANT CHANGE UP
NOREPINEPHRINE - URINE, 24 HOUR: 30 UG/24 HR — SIGNIFICANT CHANGE UP (ref 0–135)
NRBC # FLD: 0 — SIGNIFICANT CHANGE UP
PLATELET # BLD AUTO: 456 K/UL — HIGH (ref 150–400)
PMV BLD: 9.9 FL — SIGNIFICANT CHANGE UP (ref 7–13)
POTASSIUM SERPL-MCNC: 4.9 MMOL/L — SIGNIFICANT CHANGE UP (ref 3.5–5.3)
POTASSIUM SERPL-SCNC: 4.9 MMOL/L — SIGNIFICANT CHANGE UP (ref 3.5–5.3)
RBC # BLD: 3.69 M/UL — LOW (ref 3.8–5.2)
RBC # FLD: 17 % — HIGH (ref 10.3–14.5)
SODIUM SERPL-SCNC: 135 MMOL/L — SIGNIFICANT CHANGE UP (ref 135–145)
SPECIMEN SOURCE: SIGNIFICANT CHANGE UP
WBC # BLD: 16.02 K/UL — HIGH (ref 3.8–10.5)
WBC # FLD AUTO: 16.02 K/UL — HIGH (ref 3.8–10.5)

## 2018-11-21 PROCEDURE — 99232 SBSQ HOSP IP/OBS MODERATE 35: CPT

## 2018-11-21 RX ADMIN — GABAPENTIN 200 MILLIGRAM(S): 400 CAPSULE ORAL at 01:09

## 2018-11-21 RX ADMIN — HEPARIN SODIUM 17 UNIT(S)/HR: 5000 INJECTION INTRAVENOUS; SUBCUTANEOUS at 08:55

## 2018-11-21 RX ADMIN — LISINOPRIL 10 MILLIGRAM(S): 2.5 TABLET ORAL at 05:41

## 2018-11-21 RX ADMIN — LIDOCAINE 1 PATCH: 4 CREAM TOPICAL at 00:41

## 2018-11-21 RX ADMIN — GABAPENTIN 200 MILLIGRAM(S): 400 CAPSULE ORAL at 17:27

## 2018-11-21 RX ADMIN — Medication 1: at 17:26

## 2018-11-21 RX ADMIN — GABAPENTIN 200 MILLIGRAM(S): 400 CAPSULE ORAL at 11:41

## 2018-11-21 RX ADMIN — Medication 100 MILLIGRAM(S): at 03:51

## 2018-11-21 NOTE — PROGRESS NOTE ADULT - SUBJECTIVE AND OBJECTIVE BOX
68y old  Female who presents with a chief complaint of Left foot cellulitis (21 Nov 2018 14:28)      Interval history:  Intermittently febrile, no cough, no SOB, no N/V/D, no abdominal pain, denies dysuria. Pain in the foot controlled, just a little scratchy feeling in throat, no pain.     No Known Allergies    Antimicrobials:      REVIEW OF SYSTEMS:  No chest pain  No rash.     Vital Signs Last 24 Hrs  T(C): 38.1 (11-21-18 @ 15:02), Max: 38.1 (11-21-18 @ 15:02)  T(F): 100.5 (11-21-18 @ 15:02), Max: 100.5 (11-21-18 @ 15:02)  HR: 103 (11-21-18 @ 15:02) (77 - 115)  BP: 134/82 (11-21-18 @ 15:02) (134/82 - 140/92)  RR: 17 (11-21-18 @ 15:02) (17 - 18)  SpO2: 96% (11-21-18 @ 15:02) (94% - 97%)      PHYSICAL EXAM:  Patient in no acute distress. AAOX3.  Cardiovascular: S1S2 normal, tachycardic.   Lungs: + air entry B/L lung fields.  Gastrointestinal: soft, nontender, nondistended.  Extremities: lt foot edema with cold toes with cyanosis, ankle swelling with ecchymosis and warmth.   IV sites not inflamed.                             9.1    16.02 )-----------( 456      ( 21 Nov 2018 10:42 )             29.4   11-21    135  |  98  |  7   ----------------------------<  110<H>  4.9   |  22  |  0.75    Ca    9.7      21 Nov 2018 07:00      Culture - Group A Streptococcus (collected 19 Nov 2018 16:56)  Source: THROAT  Preliminary Report (21 Nov 2018 08:10):    NO BETA STREP. GROUP A  AFTER 24HRS.

## 2018-11-21 NOTE — PROGRESS NOTE ADULT - SUBJECTIVE AND OBJECTIVE BOX
VASCULAR SURGERY DAILY PROGRESS NOTE:       Subjective:  Pt seen and examined at bedside. No acute events overnight. Pain controlled.         Objective:    PE:  GEN: NAD, resting quietly  RESP: symmetric chest rise bilaterally, no increased WOB  CV: tachycardic, irregular; RLE dopplerable DP and PT signals; LLE Palpable PT  ABD: soft, NTND  EXTR: LLE cool compared to right, no ulcers or wounds; left foot edematous and TTP to palpation, no cyanosis    Vital Signs Last 24 Hrs  T(C): 37.1 (21 Nov 2018 04:39), Max: 37.9 (20 Nov 2018 14:00)  T(F): 98.7 (21 Nov 2018 04:39), Max: 100.2 (20 Nov 2018 14:00)  HR: 77 (21 Nov 2018 04:39) (77 - 115)  BP: 140/92 (21 Nov 2018 04:39) (140/86 - 147/87)  BP(mean): --  RR: 18 (21 Nov 2018 04:39) (16 - 18)  SpO2: 97% (21 Nov 2018 04:39) (94% - 99%)    I&O's Detail    20 Nov 2018 07:01  -  21 Nov 2018 07:00  --------------------------------------------------------  IN:  Total IN: 0 mL    OUT:    Voided: 200 mL  Total OUT: 200 mL    Total NET: -200 mL          Daily     Daily     MEDICATIONS  (STANDING):  dextrose 5% + sodium chloride 0.45%. 1000 milliLiter(s) (75 mL/Hr) IV Continuous <Continuous>  dextrose 5%. 1000 milliLiter(s) (50 mL/Hr) IV Continuous <Continuous>  dextrose 50% Injectable 12.5 Gram(s) IV Push once  gabapentin 200 milliGRAM(s) Oral three times a day  heparin  Infusion 1500 Unit(s)/Hr (17 mL/Hr) IV Continuous <Continuous>  insulin lispro (HumaLOG) corrective regimen sliding scale   SubCutaneous three times a day before meals  lidocaine   Patch 1 Patch Transdermal daily  lisinopril 10 milliGRAM(s) Oral daily    MEDICATIONS  (PRN):  acetaminophen   Tablet .. 975 milliGRAM(s) Oral every 6 hours PRN Mild Pain (1 - 3)  dextrose 40% Gel 15 Gram(s) Oral once PRN Blood Glucose LESS THAN 70 milliGRAM(s)/deciliter  glucagon  Injectable 1 milliGRAM(s) IntraMuscular once PRN Glucose LESS THAN 70 milligrams/deciliter  oxyCODONE    IR 10 milliGRAM(s) Oral every 6 hours PRN moderate and severe pain      LABS:                        9.1    16.02 )-----------( 456      ( 21 Nov 2018 10:42 )             29.4     11-21    135  |  98  |  7   ----------------------------<  110<H>  4.9   |  22  |  0.75    Ca    9.7      21 Nov 2018 07:00      PTT - ( 21 Nov 2018 07:00 )  PTT:83.0 SEC      RADIOLOGY & ADDITIONAL STUDIES:

## 2018-11-21 NOTE — PROGRESS NOTE ADULT - ASSESSMENT
68F with DM, HTN, weight loss, admitted on 11/14/18 with left foot pain of several months duration. Evaluation did not reveal arterial insufficiency. Despite fever and leukocytosis, she appears markedly non toxic.    fever, intermittent, persistent.   leukocytosis, persistent.   left adrenal nodule  monoarticular arthritis left ankle  Elevated ESR/ CRP  Procalcitonin 0.11    Antibiotics  Cefazolin 11/13; 11/16->  Zosyn  11/14  Vanco 11/14     Differential diagnosis included APLS vs vasculitis   Given chronicity of symptoms and not much improvement with antibiotics, suggestive of non infectious etiology. Pt non toxic appearing.       Suggest:  Stopped Cefazolin  Throat cx, negative.   Repeat blood cultures NTD.   Vascular following - ischemic changes stable  Heme and Rheum following - APLS labs sent  S/p Derm eval, recommend MRI or angiography.

## 2018-11-21 NOTE — PROGRESS NOTE ADULT - ASSESSMENT
69 yo woman with a hx of DM, HTN and breast CA s/p lumpectomy presented with left foot pain, swelling and redness. CTA obtained which showed patent vessels to ankles. Currently on medicine service undergoing work up for hypercoaguability/vasculitis    - Patient has patent vessels to ankle and palpable PT - is currently on anticoagulation per Heme/onc for possible hypercoagulability. No contraindication from vascular surgery perspective re: Heparin gtt, however would defer to Hematology regarding continuation of hepatin gtt vs. transition to other anticoagulation.     Vascular surgery  [id number] 69 yo woman with a hx of DM, HTN and breast CA s/p lumpectomy presented with left foot pain, swelling and redness. CTA obtained which showed patent vessels to ankles. Currently on medicine service undergoing work up for hypercoaguability/vasculitis    - Patient has patent vessels to ankle and palpable PT - is currently on anticoagulation per Heme/onc for possible hypercoagulability. No contraindication from vascular surgery perspective re: Heparin gtt, however would defer to Hematology regarding continuation of hepatin gtt vs. transition to other anticoagulation.   - Patient with palpable LLE PT - all vascular disease is likely microvascular. No indication for vascular surgery intervention at this time  - Care per primary team, multispecialty work up  - Will continue to follow to monitor ischemia  - Discussed with attending Dr. Lema    Vascular surgery  [id number] 69 yo woman with a hx of DM, HTN and breast CA s/p lumpectomy presented with left foot pain, swelling and redness. CTA obtained which showed patent vessels to ankles. Currently on medicine service undergoing work up for hypercoaguability/vasculitis    - Patient has patent vessels to ankle and palpable PT - is currently on anticoagulation per Heme/onc for possible hypercoagulability. No contraindication from vascular surgery perspective re: Heparin gtt, however would defer to Hematology regarding continuation of hepatin gtt vs. transition to other anticoagulation.   - Patient with palpable LLE PT - all vascular disease is likely microvascular. No indication for vascular surgery intervention at this time  - Care per primary team, multispecialty work up. Rheum recommending MRI of LLE - will follow up results  - Will continue to follow to monitor ischemia  - Discussed with attending Dr. Lema    Vascular surgery  [id number]

## 2018-11-21 NOTE — PROGRESS NOTE ADULT - ASSESSMENT
69 yo F w pmhx of T2DM, HTN presenting with 1 month of LLE swelling and discoloration admitted for cellulitis      Problem/Recommendation - 1:  Problem: Cellulitis of foot, left. Recommendation: - Pt meeting sepsis criteria on admission; sepsis now resolved  - ID appreciated, abx stopped, monitor off  - Appreciate podiatry consult  - tylenol PRN for fever     Problem/Recommendation - 2:  ·  Problem: HTN (hypertension).  Recommendation: BP well controlled  c/w home med of lisinopril.      Problem/Recommendation - 3:  ·  Problem: DM (diabetes mellitus).  Recommendation: Blood glucose well controlled  A1C 6.3  C/w sliding scale insulin.      Problem/Recommendation - 4:  ·  Problem: Microcytic anemia.  Recommendation: likely KRIS, outpt f/u     Problem/Recommendation - 5:  ·  Problem: Ischemia of LLE - c/w Heparin gtt for now, await results of vasculitis/APLS w/u  Rheum and Heme appreciated, await vasculitis w/u results  Derm appreciated, no role of repeat skin bx at this time  MRA LLE    Adrenal incidenteloma - await MRI, surg appreciated    above plan discussed w/pt at bedside and w/daughter Carina over the phone in detail

## 2018-11-21 NOTE — PROGRESS NOTE ADULT - ATTENDING COMMENTS
Seen ex'ed dw'ed res, agree w above  L LE stable isch.  No tissue loss  No gross infectrion  +PT pulse    Cont med teague  Podiatry fu  No plans for vasc intervention at this time  See previous note for detailed recs

## 2018-11-21 NOTE — CHART NOTE - NSCHARTNOTEFT_GEN_A_CORE
Reviewed patient's chart. It appears that patient's MRI/MRA has been cancelled per the EMR. Please ensure that the MRI/MRI of the LLE has been ordered, as this test is necessary for workup.    Several attempts made to ADS covering team to relay this message.    Cristian Lagos  Rheumatology Fellow PGY IV

## 2018-11-21 NOTE — PROGRESS NOTE ADULT - ATTENDING COMMENTS
Rosaline Suarez  Pager: [phone]. If no response or past 5 pm call [phone].     Will sign off, please call with questions.

## 2018-11-21 NOTE — CHART NOTE - NSCHARTNOTEFT_GEN_A_CORE
Spoke with Rheum resident Dr. Lagos regarding MRI off hep gtt. Advised by rheum every attempt to keep hep gtt infusion needs to occur. Will speak with nursing regarding this matter and follow up.

## 2018-11-21 NOTE — PROGRESS NOTE ADULT - SUBJECTIVE AND OBJECTIVE BOX
CHIEF COMPLAINT:Patient is a 68y old  Female who presents with a chief complaint of Left foot cellulitis (14 Nov 2018 18:53)    	  Interval history: no acute events      Allergies:  No Known Allergies      PAST MEDICAL & SURGICAL HISTORY:  DM (diabetes mellitus)  HTN (hypertension)  Breast CA  No significant past surgical history      FAMILY HISTORY:  No pertinent family history in first degree relatives      REVIEW OF SYSTEMS:  CONSTITUTIONAL: No fever, weight loss, or fatigue  EYES: No eye pain, visual disturbances, or discharge  NECK: No pain or stiffness  RESPIRATORY: No cough or wheezing, no shortness of breath  CARDIOVASCULAR: No chest pain, palpitations, dizziness, or leg swelling  GASTROINTESTINAL: No abdominal or epigastric pain. No nausea, vomiting, diarrhea or constipation  GENITOURINARY: No dysuria, urinary frequency or urgency, no hematuria  NEUROLOGICAL: No headaches, memory loss, loss of strength, numbness, or tremors  SKIN: No itching, burning, rashes, or lesions   MUSCULOSKELETAL: No joint pain or swelling; No muscle, back, or extremity pain      Medications:  MEDICATIONS  (STANDING):  dextrose 5% + sodium chloride 0.45%. 1000 milliLiter(s) (75 mL/Hr) IV Continuous <Continuous>  dextrose 5%. 1000 milliLiter(s) (50 mL/Hr) IV Continuous <Continuous>  dextrose 50% Injectable 12.5 Gram(s) IV Push once  gabapentin 200 milliGRAM(s) Oral three times a day  heparin  Infusion 1500 Unit(s)/Hr (17 mL/Hr) IV Continuous <Continuous>  insulin lispro (HumaLOG) corrective regimen sliding scale   SubCutaneous three times a day before meals  lidocaine   Patch 1 Patch Transdermal daily  lisinopril 10 milliGRAM(s) Oral daily    MEDICATIONS  (PRN):  acetaminophen   Tablet .. 975 milliGRAM(s) Oral every 6 hours PRN Mild Pain (1 - 3)  dextrose 40% Gel 15 Gram(s) Oral once PRN Blood Glucose LESS THAN 70 milliGRAM(s)/deciliter  glucagon  Injectable 1 milliGRAM(s) IntraMuscular once PRN Glucose LESS THAN 70 milligrams/deciliter  oxyCODONE    IR 10 milliGRAM(s) Oral every 6 hours PRN moderate and severe pain    	    PHYSICAL EXAM:  T(C): 37.1 (11-21-18 @ 04:39), Max: 37.9 (11-20-18 @ 18:26)  HR: 77 (11-21-18 @ 04:39) (77 - 115)  BP: 140/92 (11-21-18 @ 04:39) (140/86 - 140/92)  RR: 18 (11-21-18 @ 04:39) (18 - 18)  SpO2: 97% (11-21-18 @ 04:39) (94% - 97%)  Wt(kg): --  I&O's Summary    20 Nov 2018 07:01  -  21 Nov 2018 07:00  --------------------------------------------------------  IN: 0 mL / OUT: 200 mL / NET: -200 mL        Lymphatic: No lymphadenopathy  Cardiovascular: Normal S1 S2, RRR  Respiratory: Lungs clear to auscultation BL  Psychiatry: A & O x 3, Mood & affect appropriate  Gastrointestinal:  Soft, Non-tender, + BS  Skin: No rashes, No ecchymoses, No cyanosis	  Neurologic: Non-focal  Extremities: L ankle edema and tenderness unchanged    LABS:	 	    CARDIAC MARKERS:                                9.1    16.02 )-----------( 456      ( 21 Nov 2018 10:42 )             29.4     11-21    135  |  98  |  7   ----------------------------<  110<H>  4.9   |  22  |  0.75    Ca    9.7      21 Nov 2018 07:00      proBNP:   Lipid Profile:   HgA1c:   TSH:

## 2018-11-22 LAB
APTT BLD: 50.3 SEC — HIGH (ref 27.5–36.3)
APTT BLD: 64.9 SEC — HIGH (ref 27.5–36.3)
APTT BLD: 73.6 SEC — HIGH (ref 27.5–36.3)
BUN SERPL-MCNC: 9 MG/DL — SIGNIFICANT CHANGE UP (ref 7–23)
CALCIUM SERPL-MCNC: 10 MG/DL — SIGNIFICANT CHANGE UP (ref 8.4–10.5)
CHLORIDE SERPL-SCNC: 97 MMOL/L — LOW (ref 98–107)
CO2 SERPL-SCNC: 31 MMOL/L — SIGNIFICANT CHANGE UP (ref 22–31)
CREAT SERPL-MCNC: 0.86 MG/DL — SIGNIFICANT CHANGE UP (ref 0.5–1.3)
GLUCOSE BLDC GLUCOMTR-MCNC: 109 MG/DL — HIGH (ref 70–99)
GLUCOSE BLDC GLUCOMTR-MCNC: 132 MG/DL — HIGH (ref 70–99)
GLUCOSE SERPL-MCNC: 144 MG/DL — HIGH (ref 70–99)
HCT VFR BLD CALC: 30 % — LOW (ref 34.5–45)
HGB BLD-MCNC: 9.2 G/DL — LOW (ref 11.5–15.5)
MCHC RBC-ENTMCNC: 24 PG — LOW (ref 27–34)
MCHC RBC-ENTMCNC: 30.7 % — LOW (ref 32–36)
MCV RBC AUTO: 78.1 FL — LOW (ref 80–100)
NRBC # FLD: 0 — SIGNIFICANT CHANGE UP
PLATELET # BLD AUTO: 468 K/UL — HIGH (ref 150–400)
PMV BLD: 9.6 FL — SIGNIFICANT CHANGE UP (ref 7–13)
POTASSIUM SERPL-MCNC: 3.8 MMOL/L — SIGNIFICANT CHANGE UP (ref 3.5–5.3)
POTASSIUM SERPL-SCNC: 3.8 MMOL/L — SIGNIFICANT CHANGE UP (ref 3.5–5.3)
RBC # BLD: 3.84 M/UL — SIGNIFICANT CHANGE UP (ref 3.8–5.2)
RBC # FLD: 17.2 % — HIGH (ref 10.3–14.5)
S PYO SPEC QL CULT: SIGNIFICANT CHANGE UP
SODIUM SERPL-SCNC: 137 MMOL/L — SIGNIFICANT CHANGE UP (ref 135–145)
WBC # BLD: 16.85 K/UL — HIGH (ref 3.8–10.5)
WBC # FLD AUTO: 16.85 K/UL — HIGH (ref 3.8–10.5)

## 2018-11-22 RX ORDER — ACETAMINOPHEN 500 MG
650 TABLET ORAL EVERY 6 HOURS
Qty: 0 | Refills: 0 | Status: DISCONTINUED | OUTPATIENT
Start: 2018-11-22 | End: 2018-12-14

## 2018-11-22 RX ORDER — OXYCODONE HYDROCHLORIDE 5 MG/1
10 TABLET ORAL EVERY 6 HOURS
Qty: 0 | Refills: 0 | Status: DISCONTINUED | OUTPATIENT
Start: 2018-11-22 | End: 2018-11-29

## 2018-11-22 RX ADMIN — OXYCODONE HYDROCHLORIDE 10 MILLIGRAM(S): 5 TABLET ORAL at 01:11

## 2018-11-22 RX ADMIN — OXYCODONE HYDROCHLORIDE 10 MILLIGRAM(S): 5 TABLET ORAL at 02:00

## 2018-11-22 RX ADMIN — LIDOCAINE 1 PATCH: 4 CREAM TOPICAL at 21:26

## 2018-11-22 RX ADMIN — HEPARIN SODIUM 18 UNIT(S)/HR: 5000 INJECTION INTRAVENOUS; SUBCUTANEOUS at 07:35

## 2018-11-22 RX ADMIN — Medication 650 MILLIGRAM(S): at 15:01

## 2018-11-22 RX ADMIN — OXYCODONE HYDROCHLORIDE 10 MILLIGRAM(S): 5 TABLET ORAL at 17:55

## 2018-11-22 RX ADMIN — HEPARIN SODIUM 18 UNIT(S)/HR: 5000 INJECTION INTRAVENOUS; SUBCUTANEOUS at 14:54

## 2018-11-22 RX ADMIN — GABAPENTIN 200 MILLIGRAM(S): 400 CAPSULE ORAL at 21:25

## 2018-11-22 RX ADMIN — GABAPENTIN 200 MILLIGRAM(S): 400 CAPSULE ORAL at 13:01

## 2018-11-22 RX ADMIN — SODIUM CHLORIDE 75 MILLILITER(S): 9 INJECTION, SOLUTION INTRAVENOUS at 23:45

## 2018-11-22 RX ADMIN — LIDOCAINE 1 PATCH: 4 CREAM TOPICAL at 11:18

## 2018-11-22 RX ADMIN — HEPARIN SODIUM 18 UNIT(S)/HR: 5000 INJECTION INTRAVENOUS; SUBCUTANEOUS at 19:02

## 2018-11-22 RX ADMIN — SODIUM CHLORIDE 75 MILLILITER(S): 9 INJECTION, SOLUTION INTRAVENOUS at 13:00

## 2018-11-22 RX ADMIN — GABAPENTIN 200 MILLIGRAM(S): 400 CAPSULE ORAL at 01:11

## 2018-11-22 RX ADMIN — LISINOPRIL 10 MILLIGRAM(S): 2.5 TABLET ORAL at 06:22

## 2018-11-22 RX ADMIN — OXYCODONE HYDROCHLORIDE 10 MILLIGRAM(S): 5 TABLET ORAL at 18:52

## 2018-11-22 NOTE — PROGRESS NOTE ADULT - SUBJECTIVE AND OBJECTIVE BOX
GENERAL SURGERY DAILY PROGRESS NOTE:       Subjective:  Pt seen and examined at bedside. No acute events overnight. Pain controlled.        Objective:    PE:  GEN: NAD, resting quietly  RESP: symmetric chest rise bilaterally, no increased WOB  CV: tachycardic, irregular; RLE dopplerable DP and PT signals; LLE Palpable PT  ABD: soft, NTND  EXTR: LLE cool compared to right, no ulcers or wounds; left foot edematous and TTP to palpation, no cyanosis    Vital Signs Last 24 Hrs  T(C): 37.2 (21 Nov 2018 20:43), Max: 38.1 (21 Nov 2018 15:02)  T(F): 99 (21 Nov 2018 20:43), Max: 100.5 (21 Nov 2018 15:02)  HR: 105 (21 Nov 2018 20:43) (77 - 105)  BP: 143/90 (21 Nov 2018 20:43) (134/82 - 143/90)  BP(mean): --  RR: 18 (21 Nov 2018 20:43) (17 - 18)  SpO2: 93% (21 Nov 2018 20:43) (93% - 97%)    I&O's Detail    20 Nov 2018 07:01  -  21 Nov 2018 07:00  --------------------------------------------------------  IN:  Total IN: 0 mL    OUT:    Voided: 200 mL  Total OUT: 200 mL    Total NET: -200 mL          Daily     Daily     MEDICATIONS  (STANDING):  dextrose 5% + sodium chloride 0.45%. 1000 milliLiter(s) (75 mL/Hr) IV Continuous <Continuous>  dextrose 5%. 1000 milliLiter(s) (50 mL/Hr) IV Continuous <Continuous>  dextrose 50% Injectable 12.5 Gram(s) IV Push once  gabapentin 200 milliGRAM(s) Oral three times a day  heparin  Infusion 1500 Unit(s)/Hr (17 mL/Hr) IV Continuous <Continuous>  insulin lispro (HumaLOG) corrective regimen sliding scale   SubCutaneous three times a day before meals  lidocaine   Patch 1 Patch Transdermal daily  lisinopril 10 milliGRAM(s) Oral daily    MEDICATIONS  (PRN):  acetaminophen   Tablet .. 975 milliGRAM(s) Oral every 6 hours PRN Mild Pain (1 - 3)  dextrose 40% Gel 15 Gram(s) Oral once PRN Blood Glucose LESS THAN 70 milliGRAM(s)/deciliter  glucagon  Injectable 1 milliGRAM(s) IntraMuscular once PRN Glucose LESS THAN 70 milligrams/deciliter  oxyCODONE    IR 10 milliGRAM(s) Oral every 6 hours PRN moderate and severe pain      LABS:                        9.1    16.02 )-----------( 456      ( 21 Nov 2018 10:42 )             29.4     11-21    135  |  98  |  7   ----------------------------<  110<H>  4.9   |  22  |  0.75    Ca    9.7      21 Nov 2018 07:00      PTT - ( 21 Nov 2018 07:00 )  PTT:83.0 SEC      RADIOLOGY & ADDITIONAL STUDIES:

## 2018-11-22 NOTE — PROGRESS NOTE ADULT - ASSESSMENT
69 yo F w pmhx of T2DM, HTN presenting with 1 month of LLE swelling and discoloration admitted for cellulitis      Problem/Recommendation - 1:  Problem: Cellulitis of foot, left. Recommendation: - Pt meeting sepsis criteria on admission; sepsis now resolved  - ID appreciated, abx stopped, monitor off  - Appreciate podiatry consult  - tylenol PRN for fever     Problem/Recommendation - 2:  ·  Problem: HTN (hypertension).  Recommendation: BP well controlled  c/w home med of lisinopril.      Problem/Recommendation - 3:  ·  Problem: DM (diabetes mellitus).  Recommendation: Blood glucose well controlled  A1C 6.3  C/w sliding scale insulin.      Problem/Recommendation - 4:  ·  Problem: Microcytic anemia.  Recommendation: likely KRIS, outpt f/u     Problem/Recommendation - 5:  ·  Problem: Ischemia of LLE - c/w Heparin gtt for now, await results of vasculitis/APLS w/u  Rheum and Heme appreciated, await vasculitis w/u results  Derm appreciated, no role of repeat skin bx at this time  MRA LLE pending    Adrenal incidenteloma - await MRI, surg appreciated

## 2018-11-22 NOTE — PROGRESS NOTE ADULT - ASSESSMENT
69 yo woman with a hx of DM, HTN and breast CA s/p lumpectomy presented with left foot pain, swelling and redness. CTA obtained which showed patent vessels to ankles. Currently on medicine service undergoing work up for hypercoaguability/vasculitis    - Patient has patent vessels to ankle and palpable PT - is currently on anticoagulation per Heme/onc for possible hypercoagulability. No contraindication from vascular surgery perspective re: Heparin gtt, however would defer to Hematology regarding continuation of hepatin gtt vs. transition to other anticoagulation.   - Patient with palpable LLE PT - all vascular disease is likely microvascular. No indication for vascular surgery intervention at this time  - Care per primary team, multispecialty work up. Rheum recommending MRI of LLE - will follow up results  - Will continue to follow to monitor ischemia  - Discussed with attending Dr. Lema    Vascular surgery  [id number]

## 2018-11-22 NOTE — PROGRESS NOTE ADULT - SUBJECTIVE AND OBJECTIVE BOX
CHIEF COMPLAINT:Patient is a 68y old  Female who presents with a chief complaint of Left foot cellulitis (14 Nov 2018 18:53)    	  Interval history: no acute events      Allergies:  No Known Allergies      PAST MEDICAL & SURGICAL HISTORY:  DM (diabetes mellitus)  HTN (hypertension)  Breast CA  No significant past surgical history      FAMILY HISTORY:  No pertinent family history in first degree relatives      REVIEW OF SYSTEMS:  CONSTITUTIONAL: No fever, weight loss, or fatigue  EYES: No eye pain, visual disturbances, or discharge  NECK: No pain or stiffness  RESPIRATORY: No cough or wheezing, no shortness of breath  CARDIOVASCULAR: No chest pain, palpitations, dizziness, or leg swelling  GASTROINTESTINAL: No abdominal or epigastric pain. No nausea, vomiting, diarrhea or constipation  GENITOURINARY: No dysuria, urinary frequency or urgency, no hematuria  NEUROLOGICAL: No headaches, memory loss, loss of strength, numbness, or tremors  SKIN: No itching, burning, rashes, or lesions   MUSCULOSKELETAL: No joint pain or swelling; R ankle pain better controlled        Medications:  MEDICATIONS  (STANDING):  dextrose 5% + sodium chloride 0.45%. 1000 milliLiter(s) (75 mL/Hr) IV Continuous <Continuous>  dextrose 5%. 1000 milliLiter(s) (50 mL/Hr) IV Continuous <Continuous>  dextrose 50% Injectable 12.5 Gram(s) IV Push once  gabapentin 200 milliGRAM(s) Oral three times a day  heparin  Infusion 1500 Unit(s)/Hr (18 mL/Hr) IV Continuous <Continuous>  insulin lispro (HumaLOG) corrective regimen sliding scale   SubCutaneous three times a day before meals  lidocaine   Patch 1 Patch Transdermal daily  lisinopril 10 milliGRAM(s) Oral daily    MEDICATIONS  (PRN):  acetaminophen   Tablet .. 975 milliGRAM(s) Oral every 6 hours PRN Mild Pain (1 - 3)  dextrose 40% Gel 15 Gram(s) Oral once PRN Blood Glucose LESS THAN 70 milliGRAM(s)/deciliter  glucagon  Injectable 1 milliGRAM(s) IntraMuscular once PRN Glucose LESS THAN 70 milligrams/deciliter  oxyCODONE    IR 10 milliGRAM(s) Oral every 6 hours PRN moderate and severe pain    	    PHYSICAL EXAM:  T(C): 37.1 (11-22-18 @ 06:20), Max: 38.1 (11-21-18 @ 15:02)  HR: 77 (11-22-18 @ 06:20) (77 - 105)  BP: 139/69 (11-22-18 @ 06:20) (134/82 - 143/90)  RR: 20 (11-22-18 @ 06:20) (17 - 20)  SpO2: 97% (11-22-18 @ 06:20) (93% - 97%)  Wt(kg): --  I&O's Summary    Lymphatic: No lymphadenopathy  Cardiovascular: Normal S1 S2, RRR  Respiratory: Lungs clear to auscultation BL  Psychiatry: A & O x 3, Mood & affect appropriate  Gastrointestinal:  Soft, Non-tender, + BS  Skin: No rashes, No ecchymoses, No cyanosis	  Neurologic: Non-focal  Extremities: L ankle edema and tenderness unchanged    LABS:	 	    CARDIAC MARKERS:                                9.1    16.02 )-----------( 456      ( 21 Nov 2018 10:42 )             29.4     11-21    135  |  98  |  7   ----------------------------<  110<H>  4.9   |  22  |  0.75    Ca    9.7      21 Nov 2018 07:00      proBNP:   Lipid Profile:   HgA1c:   TSH:

## 2018-11-23 LAB
APTT BLD: 58.5 SEC — HIGH (ref 27.5–36.3)
BACTERIA BLD CULT: SIGNIFICANT CHANGE UP
BACTERIA BLD CULT: SIGNIFICANT CHANGE UP
BUN SERPL-MCNC: 9 MG/DL — SIGNIFICANT CHANGE UP (ref 7–23)
CALCIUM SERPL-MCNC: 10 MG/DL — SIGNIFICANT CHANGE UP (ref 8.4–10.5)
CHLORIDE SERPL-SCNC: 97 MMOL/L — LOW (ref 98–107)
CO2 SERPL-SCNC: 29 MMOL/L — SIGNIFICANT CHANGE UP (ref 22–31)
CREAT SERPL-MCNC: 0.86 MG/DL — SIGNIFICANT CHANGE UP (ref 0.5–1.3)
GLUCOSE BLDC GLUCOMTR-MCNC: 104 MG/DL — HIGH (ref 70–99)
GLUCOSE BLDC GLUCOMTR-MCNC: 140 MG/DL — HIGH (ref 70–99)
GLUCOSE BLDC GLUCOMTR-MCNC: 152 MG/DL — HIGH (ref 70–99)
GLUCOSE BLDC GLUCOMTR-MCNC: 171 MG/DL — HIGH (ref 70–99)
GLUCOSE SERPL-MCNC: 112 MG/DL — HIGH (ref 70–99)
HCT VFR BLD CALC: 30.6 % — LOW (ref 34.5–45)
HGB A MFR BLD: 96.8 % — SIGNIFICANT CHANGE UP
HGB A2 MFR BLD: 2.8 % — SIGNIFICANT CHANGE UP (ref 2.4–3.5)
HGB BLD-MCNC: 9.4 G/DL — LOW (ref 11.5–15.5)
HGB ELECT COMMENT: SIGNIFICANT CHANGE UP
HGB F MFR BLD: < 1 % — SIGNIFICANT CHANGE UP (ref 0–1.5)
MCHC RBC-ENTMCNC: 24.5 PG — LOW (ref 27–34)
MCHC RBC-ENTMCNC: 30.7 % — LOW (ref 32–36)
MCV RBC AUTO: 79.9 FL — LOW (ref 80–100)
NRBC # FLD: 0 — SIGNIFICANT CHANGE UP
PLATELET # BLD AUTO: 447 K/UL — HIGH (ref 150–400)
PMV BLD: 9.5 FL — SIGNIFICANT CHANGE UP (ref 7–13)
POTASSIUM SERPL-MCNC: 4.1 MMOL/L — SIGNIFICANT CHANGE UP (ref 3.5–5.3)
POTASSIUM SERPL-SCNC: 4.1 MMOL/L — SIGNIFICANT CHANGE UP (ref 3.5–5.3)
RBC # BLD: 3.83 M/UL — SIGNIFICANT CHANGE UP (ref 3.8–5.2)
RBC # FLD: 17 % — HIGH (ref 10.3–14.5)
SODIUM SERPL-SCNC: 137 MMOL/L — SIGNIFICANT CHANGE UP (ref 135–145)
WBC # BLD: 12.79 K/UL — HIGH (ref 3.8–10.5)
WBC # FLD AUTO: 12.79 K/UL — HIGH (ref 3.8–10.5)

## 2018-11-23 PROCEDURE — 73725 MR ANG LWR EXT W OR W/O DYE: CPT | Mod: 26,LT

## 2018-11-23 PROCEDURE — 99232 SBSQ HOSP IP/OBS MODERATE 35: CPT

## 2018-11-23 PROCEDURE — 74181 MRI ABDOMEN W/O CONTRAST: CPT | Mod: 26

## 2018-11-23 RX ADMIN — LISINOPRIL 10 MILLIGRAM(S): 2.5 TABLET ORAL at 05:32

## 2018-11-23 RX ADMIN — SODIUM CHLORIDE 75 MILLILITER(S): 9 INJECTION, SOLUTION INTRAVENOUS at 11:38

## 2018-11-23 RX ADMIN — GABAPENTIN 200 MILLIGRAM(S): 400 CAPSULE ORAL at 21:10

## 2018-11-23 RX ADMIN — Medication 1: at 12:37

## 2018-11-23 RX ADMIN — GABAPENTIN 200 MILLIGRAM(S): 400 CAPSULE ORAL at 05:32

## 2018-11-23 RX ADMIN — Medication 650 MILLIGRAM(S): at 13:27

## 2018-11-23 RX ADMIN — LIDOCAINE 1 PATCH: 4 CREAM TOPICAL at 11:40

## 2018-11-23 RX ADMIN — GABAPENTIN 200 MILLIGRAM(S): 400 CAPSULE ORAL at 14:05

## 2018-11-23 RX ADMIN — LIDOCAINE 1 PATCH: 4 CREAM TOPICAL at 22:40

## 2018-11-23 RX ADMIN — LIDOCAINE 1 PATCH: 4 CREAM TOPICAL at 18:20

## 2018-11-23 RX ADMIN — HEPARIN SODIUM 18 UNIT(S)/HR: 5000 INJECTION INTRAVENOUS; SUBCUTANEOUS at 14:06

## 2018-11-23 NOTE — PROGRESS NOTE ADULT - ASSESSMENT
69 yo woman with a hx of DM, HTN and breast CA s/p lumpectomy presented with left foot pain, swelling and redness. CTA obtained which showed patent vessels to ankles. Currently on medicine service undergoing work up for hypercoaguability/vasculitis    - Patient with palpable LLE PT - all vascular disease is likely microvascular. No indication for vascular surgery intervention at this time  - Will f/u MRI LLE  - Care per primary team, multispecialty work up.  - Will continue to follow to monitor left foot ischemia    Vascular surgery  [id number]

## 2018-11-23 NOTE — PROGRESS NOTE ADULT - SUBJECTIVE AND OBJECTIVE BOX
Follow Up:  cool left foot    Interval History/ROS: a little better - some discomfort in left foot    Allergies  No Known Allergies        ANTIMICROBIALS:      OTHER MEDS:  MEDICATIONS  (STANDING):  acetaminophen   Tablet .. 975 every 6 hours PRN  acetaminophen   Tablet .. 650 every 6 hours PRN  dextrose 40% Gel 15 once PRN  dextrose 50% Injectable 12.5 once  gabapentin 200 three times a day  glucagon  Injectable 1 once PRN  heparin  Infusion 1500 <Continuous>  insulin lispro (HumaLOG) corrective regimen sliding scale  three times a day before meals  lisinopril 10 daily  oxyCODONE    IR 10 every 6 hours PRN      Vital Signs Last 24 Hrs  T(C): 38 (23 Nov 2018 13:01), Max: 38 (22 Nov 2018 14:53)  T(F): 100.4 (23 Nov 2018 13:01), Max: 100.4 (22 Nov 2018 14:53)  HR: 101 (23 Nov 2018 13:01) (88 - 101)  BP: 120/77 (23 Nov 2018 13:01) (120/77 - 130/74)  BP(mean): --  RR: 18 (23 Nov 2018 13:01) (18 - 20)  SpO2: 95% (23 Nov 2018 13:01) (95% - 98%)    PHYSICAL EXAM:  General: WN/WD NAD, Non-toxic  Neurology: A&Ox3, nonfocal  Respiratory: Clear to auscultation bilaterally  CV: RRR, S1S2, no murmurs, rubs or gallops  Abdominal: Soft, Non-tender, non-distended, normal bowel sounds  Extremities: cool cyanotic left foot from just above ankle to distal foot with early acral dark eschar  Line Sites: Clear  Skin: No rash                        9.4    12.79 )-----------( 447      ( 23 Nov 2018 05:28 )             30.6       11-23    137  |  97<L>  |  9   ----------------------------<  112<H>  4.1   |  29  |  0.86    Ca    10.0      23 Nov 2018 05:28      MICROBIOLOGY:  THROAT  11-19-18 --  --  --      BLOOD PERIPHERAL  11-18-18 --  --  --      BLOOD  11-14-18 --  --  --        RADIOLOGY:    Dayne Briseno MD; Division of Infectious Disease; Pager: [phone]; nights and weekends: [phone]

## 2018-11-23 NOTE — PROGRESS NOTE ADULT - SUBJECTIVE AND OBJECTIVE BOX
VASCULAR SURGERY PROGRESS NOTE      Subjective:  No acute events overnight        Objective:    PE:  GEN: NAD, resting quietly  RESP: symmetric chest rise bilaterally, no increased WOB  CV: tachycardic, irregular; RLE dopplerable DP and PT signals; LLE Palpable PT  ABD: soft, NTND  EXTR: LLE cool compared to right, no ulcers or wounds; left foot edematous and TTP to palpation, no cyanosis    Vital Signs Last 24 Hrs  T(C): 37.6 (23 Nov 2018 05:28), Max: 38 (22 Nov 2018 14:53)  T(F): 99.7 (23 Nov 2018 05:28), Max: 100.4 (22 Nov 2018 14:53)  HR: 94 (23 Nov 2018 05:28) (85 - 97)  BP: 123/79 (23 Nov 2018 05:28) (123/79 - 132/80)  BP(mean): --  RR: 18 (23 Nov 2018 05:28) (18 - 20)  SpO2: 96% (23 Nov 2018 05:28) (96% - 98%)    I&O's Detail      Daily     Daily     MEDICATIONS  (STANDING):  dextrose 5% + sodium chloride 0.45%. 1000 milliLiter(s) (75 mL/Hr) IV Continuous <Continuous>  dextrose 5%. 1000 milliLiter(s) (50 mL/Hr) IV Continuous <Continuous>  dextrose 50% Injectable 12.5 Gram(s) IV Push once  gabapentin 200 milliGRAM(s) Oral three times a day  heparin  Infusion 1500 Unit(s)/Hr (18 mL/Hr) IV Continuous <Continuous>  insulin lispro (HumaLOG) corrective regimen sliding scale   SubCutaneous three times a day before meals  lidocaine   Patch 1 Patch Transdermal daily  lisinopril 10 milliGRAM(s) Oral daily    MEDICATIONS  (PRN):  acetaminophen   Tablet .. 975 milliGRAM(s) Oral every 6 hours PRN Mild Pain (1 - 3)  acetaminophen   Tablet .. 650 milliGRAM(s) Oral every 6 hours PRN Temp greater or equal to 38C (100.4F)  dextrose 40% Gel 15 Gram(s) Oral once PRN Blood Glucose LESS THAN 70 milliGRAM(s)/deciliter  glucagon  Injectable 1 milliGRAM(s) IntraMuscular once PRN Glucose LESS THAN 70 milligrams/deciliter  oxyCODONE    IR 10 milliGRAM(s) Oral every 6 hours PRN moderate and severe pain      LABS:                        9.4    12.79 )-----------( 447      ( 23 Nov 2018 05:28 )             30.6     11-23    137  |  97<L>  |  9   ----------------------------<  112<H>  4.1   |  29  |  0.86    Ca    10.0      23 Nov 2018 05:28      PTT - ( 22 Nov 2018 18:15 )  PTT:73.6 SEC      RADIOLOGY & ADDITIONAL STUDIES:

## 2018-11-23 NOTE — PHYSICAL THERAPY INITIAL EVALUATION ADULT - LEVEL OF INDEPENDENCE: GAIT, REHAB EVAL
unable to perform/to avoid pain, patient is unable to weight bear on left LE despite use of a rolling walker.

## 2018-11-24 LAB
APTT BLD: 92.1 SEC — HIGH (ref 27.5–36.3)
BUN SERPL-MCNC: 7 MG/DL — SIGNIFICANT CHANGE UP (ref 7–23)
CALCIUM SERPL-MCNC: 9.7 MG/DL — SIGNIFICANT CHANGE UP (ref 8.4–10.5)
CHLORIDE SERPL-SCNC: 99 MMOL/L — SIGNIFICANT CHANGE UP (ref 98–107)
CO2 SERPL-SCNC: 27 MMOL/L — SIGNIFICANT CHANGE UP (ref 22–31)
CREAT SERPL-MCNC: 0.73 MG/DL — SIGNIFICANT CHANGE UP (ref 0.5–1.3)
GLUCOSE BLDC GLUCOMTR-MCNC: 128 MG/DL — HIGH (ref 70–99)
GLUCOSE BLDC GLUCOMTR-MCNC: 133 MG/DL — HIGH (ref 70–99)
GLUCOSE BLDC GLUCOMTR-MCNC: 135 MG/DL — HIGH (ref 70–99)
GLUCOSE BLDC GLUCOMTR-MCNC: 142 MG/DL — HIGH (ref 70–99)
GLUCOSE SERPL-MCNC: 134 MG/DL — HIGH (ref 70–99)
HCT VFR BLD CALC: 27.6 % — LOW (ref 34.5–45)
HCT VFR BLD CALC: 29.2 % — LOW (ref 34.5–45)
HGB BLD-MCNC: 8.5 G/DL — LOW (ref 11.5–15.5)
HGB BLD-MCNC: 8.9 G/DL — LOW (ref 11.5–15.5)
MCHC RBC-ENTMCNC: 24 PG — LOW (ref 27–34)
MCHC RBC-ENTMCNC: 24.4 PG — LOW (ref 27–34)
MCHC RBC-ENTMCNC: 30.5 % — LOW (ref 32–36)
MCHC RBC-ENTMCNC: 30.8 % — LOW (ref 32–36)
MCV RBC AUTO: 78.7 FL — LOW (ref 80–100)
MCV RBC AUTO: 79.1 FL — LOW (ref 80–100)
NRBC # FLD: 0 — SIGNIFICANT CHANGE UP
NRBC # FLD: 0 — SIGNIFICANT CHANGE UP
PLATELET # BLD AUTO: 456 K/UL — HIGH (ref 150–400)
PLATELET # BLD AUTO: 479 K/UL — HIGH (ref 150–400)
PMV BLD: 9.3 FL — SIGNIFICANT CHANGE UP (ref 7–13)
PMV BLD: 9.7 FL — SIGNIFICANT CHANGE UP (ref 7–13)
POTASSIUM SERPL-MCNC: 3.5 MMOL/L — SIGNIFICANT CHANGE UP (ref 3.5–5.3)
POTASSIUM SERPL-SCNC: 3.5 MMOL/L — SIGNIFICANT CHANGE UP (ref 3.5–5.3)
RBC # BLD: 3.49 M/UL — LOW (ref 3.8–5.2)
RBC # BLD: 3.71 M/UL — LOW (ref 3.8–5.2)
RBC # FLD: 16.8 % — HIGH (ref 10.3–14.5)
RBC # FLD: 17.2 % — HIGH (ref 10.3–14.5)
SODIUM SERPL-SCNC: 136 MMOL/L — SIGNIFICANT CHANGE UP (ref 135–145)
WBC # BLD: 12.78 K/UL — HIGH (ref 3.8–10.5)
WBC # BLD: 13.29 K/UL — HIGH (ref 3.8–10.5)
WBC # FLD AUTO: 12.78 K/UL — HIGH (ref 3.8–10.5)
WBC # FLD AUTO: 13.29 K/UL — HIGH (ref 3.8–10.5)

## 2018-11-24 RX ADMIN — GABAPENTIN 200 MILLIGRAM(S): 400 CAPSULE ORAL at 05:16

## 2018-11-24 RX ADMIN — SODIUM CHLORIDE 75 MILLILITER(S): 9 INJECTION, SOLUTION INTRAVENOUS at 11:34

## 2018-11-24 RX ADMIN — LIDOCAINE 1 PATCH: 4 CREAM TOPICAL at 11:34

## 2018-11-24 RX ADMIN — SODIUM CHLORIDE 75 MILLILITER(S): 9 INJECTION, SOLUTION INTRAVENOUS at 05:16

## 2018-11-24 RX ADMIN — GABAPENTIN 200 MILLIGRAM(S): 400 CAPSULE ORAL at 14:57

## 2018-11-24 RX ADMIN — GABAPENTIN 200 MILLIGRAM(S): 400 CAPSULE ORAL at 21:21

## 2018-11-24 RX ADMIN — HEPARIN SODIUM 18 UNIT(S)/HR: 5000 INJECTION INTRAVENOUS; SUBCUTANEOUS at 06:05

## 2018-11-24 RX ADMIN — LIDOCAINE 1 PATCH: 4 CREAM TOPICAL at 23:48

## 2018-11-24 RX ADMIN — LIDOCAINE 1 PATCH: 4 CREAM TOPICAL at 19:24

## 2018-11-24 RX ADMIN — LISINOPRIL 10 MILLIGRAM(S): 2.5 TABLET ORAL at 05:16

## 2018-11-24 NOTE — PROGRESS NOTE ADULT - ASSESSMENT
67 yo F w pmhx of T2DM, HTN presenting with 1 month of LLE swelling and discoloration admitted for cellulitis       Problem: Cellulitis of foot, left. Recommendation: - Pt meeting sepsis criteria on admission; sepsis now resolved  - ID appreciated, abx stopped, monitor off  - Appreciate podiatry consult  - tylenol PRN for fever      ·  Problem: HTN (hypertension).  Recommendation: BP well controlled  c/w home med of lisinopril.       ·  Problem: DM (diabetes mellitus).  Recommendation: Blood glucose well controlled  A1C 6.3  C/w sliding scale insulin.       ·  Problem: Microcytic anemia.  Recommendation: likely KRIS, outpt f/u    ·  Problem: Ischemia of LLE - c/w Heparin gtt for now, await results of vasculitis/APLS w/u  Rheum and Heme appreciated, await vasculitis w/u results  Derm appreciated, no role of repeat skin bx at this time  MRA LLE  doone f/u results     Adrenal incidenteloma - await MRI, surg appreciated 69 yo F w pmhx of T2DM, HTN presenting with 1 month of LLE swelling and discoloration admitted for cellulitis       Problem: Cellulitis of foot, left. Recommendation: - Pt meeting sepsis criteria on admission; sepsis now resolved  - ID appreciated, abx stopped, monitor off  - Appreciate podiatry consult  - tylenol PRN for fever      ·  Problem: HTN (hypertension).  Recommendation: BP well controlled  c/w home med of lisinopril.       ·  Problem: DM (diabetes mellitus).  Recommendation: Blood glucose well controlled  A1C 6.3  C/w sliding scale insulin.       ·  Problem: Microcytic anemia.  Recommendation: likely KRIS, outpt f/u    ·  Problem: Ischemia of LLE - c/w Heparin gtt for now, await results of vasculitis/APLS w/u  Rheum and Heme appreciated, await vasculitis w/u results  Derm appreciated, no role of repeat skin bx at this time  MRA LLE  doone f/u results     Adrenal incidenteloma - await MRI, surg appreciated    addendum  discussed case w/ daughter at length at approx 5 pm  discussed issues at hand and that work up was unrevieling for any surgical intervention which i concurred w/ vascular today  rheum / to f/u for any additional recs at this point   seems as thou amputation of toes or foot maybe likely  discussed w/ daughter    base

## 2018-11-24 NOTE — PROGRESS NOTE ADULT - SUBJECTIVE AND OBJECTIVE BOX
CHIEF COMPLAINT:Patient is a 68y old  Female who presents with a chief complaint of Left foot cellulitis (23 Nov 2018 13:43)    	        PAST MEDICAL & SURGICAL HISTORY:  DM (diabetes mellitus)  HTN (hypertension)  Breast CA  No significant past surgical history          REVIEW OF SYSTEMS:  CONSTITUTIONAL: No fever, weight loss, or fatigue  EYES: No eye pain, visual disturbances, or discharge  NECK: No pain or stiffness  RESPIRATORY: No cough, wheezing, chills or hemoptysis; No Shortness of Breath  CARDIOVASCULAR: No chest pain, palpitations, passing out, dizziness,   GASTROINTESTINAL: No abdominal or epigastric pain. No nausea, vomiting, or hematemesis; No diarrhea or constipation. No melena or hematochezia.  GENITOURINARY: No dysuria, frequency, hematuria, or incontinence  NEUROLOGICAL: No headaches, memory loss, loss of strength, numbness, or tremors  l foot discomfort     Medications:  MEDICATIONS  (STANDING):  dextrose 5% + sodium chloride 0.45%. 1000 milliLiter(s) (75 mL/Hr) IV Continuous <Continuous>  dextrose 5%. 1000 milliLiter(s) (50 mL/Hr) IV Continuous <Continuous>  dextrose 50% Injectable 12.5 Gram(s) IV Push once  gabapentin 200 milliGRAM(s) Oral three times a day  heparin  Infusion 1500 Unit(s)/Hr (18 mL/Hr) IV Continuous <Continuous>  insulin lispro (HumaLOG) corrective regimen sliding scale   SubCutaneous three times a day before meals  lidocaine   Patch 1 Patch Transdermal daily  lisinopril 10 milliGRAM(s) Oral daily    MEDICATIONS  (PRN):  acetaminophen   Tablet .. 975 milliGRAM(s) Oral every 6 hours PRN Mild Pain (1 - 3)  acetaminophen   Tablet .. 650 milliGRAM(s) Oral every 6 hours PRN Temp greater or equal to 38C (100.4F)  dextrose 40% Gel 15 Gram(s) Oral once PRN Blood Glucose LESS THAN 70 milliGRAM(s)/deciliter  glucagon  Injectable 1 milliGRAM(s) IntraMuscular once PRN Glucose LESS THAN 70 milligrams/deciliter  oxyCODONE    IR 10 milliGRAM(s) Oral every 6 hours PRN moderate and severe pain    	    PHYSICAL EXAM:  T(C): 37.4 (11-24-18 @ 05:14), Max: 38 (11-23-18 @ 13:01)  HR: 98 (11-24-18 @ 05:14) (97 - 101)  BP: 140/90 (11-24-18 @ 05:14) (120/77 - 140/90)  RR: 18 (11-24-18 @ 05:14) (17 - 18)  SpO2: 99% (11-24-18 @ 05:14) (95% - 99%)  Wt(kg): --  I&O's Summary      Appearance: Normal	  HEENT:   Normal oral mucosa, PERRL, EOMI	  Lymphatic: No lymphadenopathy  Cardiovascular: Normal S1 S2, No JVD, No murmurs, No edema  Respiratory: Lungs clear to auscultation	  Psychiatry: A & O x 3, Mood & affect appropriate  Gastrointestinal:  Soft, Non-tender, + BS	  Skin: No rashes, No ecchymoses, No cyanosis	  Neurologic: Non-focal  Extremities: l foot vasc changes / some swelling     Vascular: Peripheral pulses palpable 2+ bilaterally    TELEMETRY: 	    ECG:  	  RADIOLOGY:  OTHER: 	  	  LABS:	 	    CARDIAC MARKERS:                                8.5    12.78 )-----------( 479      ( 24 Nov 2018 05:03 )             27.6     11-24    136  |  99  |  7   ----------------------------<  134<H>  3.5   |  27  |  0.73    Ca    9.7      24 Nov 2018 05:03      proBNP:   Lipid Profile:   HgA1c:   TSH: CHIEF COMPLAINT:Patient is a 68y old  Female who presents with a chief complaint of Left foot cellulitis (23 Nov 2018 13:43)    	        PAST MEDICAL & SURGICAL HISTORY:  DM (diabetes mellitus)  HTN (hypertension)  Breast CA  No significant past surgical history          REVIEW OF SYSTEMS:  CONSTITUTIONAL: No fever, weight loss, or fatigue  EYES: No eye pain, visual disturbances, or discharge  NECK: No pain or stiffness  RESPIRATORY: No cough, wheezing, chills or hemoptysis; No Shortness of Breath  CARDIOVASCULAR: No chest pain, palpitations, passing out, dizziness,   GASTROINTESTINAL: No abdominal or epigastric pain. No nausea, vomiting, or hematemesis; No diarrhea or constipation. No melena or hematochezia.  GENITOURINARY: No dysuria, frequency, hematuria, or incontinence  NEUROLOGICAL: No headaches, memory loss, loss of strength, numbness, or tremors  l foot discomfort     Medications:  MEDICATIONS  (STANDING):  dextrose 5% + sodium chloride 0.45%. 1000 milliLiter(s) (75 mL/Hr) IV Continuous <Continuous>  dextrose 5%. 1000 milliLiter(s) (50 mL/Hr) IV Continuous <Continuous>  dextrose 50% Injectable 12.5 Gram(s) IV Push once  gabapentin 200 milliGRAM(s) Oral three times a day  heparin  Infusion 1500 Unit(s)/Hr (18 mL/Hr) IV Continuous <Continuous>  insulin lispro (HumaLOG) corrective regimen sliding scale   SubCutaneous three times a day before meals  lidocaine   Patch 1 Patch Transdermal daily  lisinopril 10 milliGRAM(s) Oral daily    MEDICATIONS  (PRN):  acetaminophen   Tablet .. 975 milliGRAM(s) Oral every 6 hours PRN Mild Pain (1 - 3)  acetaminophen   Tablet .. 650 milliGRAM(s) Oral every 6 hours PRN Temp greater or equal to 38C (100.4F)  dextrose 40% Gel 15 Gram(s) Oral once PRN Blood Glucose LESS THAN 70 milliGRAM(s)/deciliter  glucagon  Injectable 1 milliGRAM(s) IntraMuscular once PRN Glucose LESS THAN 70 milligrams/deciliter  oxyCODONE    IR 10 milliGRAM(s) Oral every 6 hours PRN moderate and severe pain    	    PHYSICAL EXAM:  T(C): 37.4 (11-24-18 @ 05:14), Max: 38 (11-23-18 @ 13:01)  HR: 98 (11-24-18 @ 05:14) (97 - 101)  BP: 140/90 (11-24-18 @ 05:14) (120/77 - 140/90)  RR: 18 (11-24-18 @ 05:14) (17 - 18)  SpO2: 99% (11-24-18 @ 05:14) (95% - 99%)  Wt(kg): --  I&O's Summary      Appearance: Normal	  HEENT:   Normal oral mucosa, PERRL, EOMI	  Lymphatic: No lymphadenopathy  Cardiovascular: Normal S1 S2, No JVD, No murmurs, No edema  Respiratory: Lungs clear to auscultation	  Psychiatry: A & O x 3, Mood & affect appropriate  Gastrointestinal:  Soft, Non-tender, + BS	  Skin: No rashes, No ecchymoses, No cyanosis	  Neurologic: Non-focal  Extremities: l foot vasc changes / some swelling     Vascular: Peripheral pulses palpable / dec leftlext foot    TELEMETRY: 	    ECG:  	  RADIOLOGY:  OTHER: 	  	  LABS:	 	    CARDIAC MARKERS:                                8.5    12.78 )-----------( 479      ( 24 Nov 2018 05:03 )             27.6     11-24    136  |  99  |  7   ----------------------------<  134<H>  3.5   |  27  |  0.73    Ca    9.7      24 Nov 2018 05:03      proBNP:   Lipid Profile:   HgA1c:   TSH:

## 2018-11-25 LAB
ANA TITR SER: NEGATIVE — SIGNIFICANT CHANGE UP
APTT BLD: 98.2 SEC — HIGH (ref 27.5–36.3)
BLD GP AB SCN SERPL QL: NEGATIVE — SIGNIFICANT CHANGE UP
BUN SERPL-MCNC: 6 MG/DL — LOW (ref 7–23)
CALCIUM SERPL-MCNC: 8.9 MG/DL — SIGNIFICANT CHANGE UP (ref 8.4–10.5)
CHLORIDE SERPL-SCNC: 97 MMOL/L — LOW (ref 98–107)
CO2 SERPL-SCNC: 26 MMOL/L — SIGNIFICANT CHANGE UP (ref 22–31)
CREAT SERPL-MCNC: 0.69 MG/DL — SIGNIFICANT CHANGE UP (ref 0.5–1.3)
GLUCOSE BLDC GLUCOMTR-MCNC: 115 MG/DL — HIGH (ref 70–99)
GLUCOSE BLDC GLUCOMTR-MCNC: 119 MG/DL — HIGH (ref 70–99)
GLUCOSE BLDC GLUCOMTR-MCNC: 122 MG/DL — HIGH (ref 70–99)
GLUCOSE BLDC GLUCOMTR-MCNC: 129 MG/DL — HIGH (ref 70–99)
GLUCOSE SERPL-MCNC: 376 MG/DL — HIGH (ref 70–99)
HCT VFR BLD CALC: 24.8 % — LOW (ref 34.5–45)
HCT VFR BLD CALC: 27.1 % — LOW (ref 34.5–45)
HGB BLD-MCNC: 7.6 G/DL — LOW (ref 11.5–15.5)
HGB BLD-MCNC: 8.5 G/DL — LOW (ref 11.5–15.5)
MCHC RBC-ENTMCNC: 23.8 PG — LOW (ref 27–34)
MCHC RBC-ENTMCNC: 24.1 PG — LOW (ref 27–34)
MCHC RBC-ENTMCNC: 30.6 % — LOW (ref 32–36)
MCHC RBC-ENTMCNC: 31.4 % — LOW (ref 32–36)
MCV RBC AUTO: 77 FL — LOW (ref 80–100)
MCV RBC AUTO: 77.5 FL — LOW (ref 80–100)
NRBC # FLD: 0 — SIGNIFICANT CHANGE UP
NRBC # FLD: 0 — SIGNIFICANT CHANGE UP
PLATELET # BLD AUTO: 478 K/UL — HIGH (ref 150–400)
PLATELET # BLD AUTO: 530 K/UL — HIGH (ref 150–400)
PMV BLD: 9.1 FL — SIGNIFICANT CHANGE UP (ref 7–13)
PMV BLD: 9.3 FL — SIGNIFICANT CHANGE UP (ref 7–13)
POTASSIUM SERPL-MCNC: 3.1 MMOL/L — LOW (ref 3.5–5.3)
POTASSIUM SERPL-SCNC: 3.1 MMOL/L — LOW (ref 3.5–5.3)
RBC # BLD: 3.2 M/UL — LOW (ref 3.8–5.2)
RBC # BLD: 3.52 M/UL — LOW (ref 3.8–5.2)
RBC # FLD: 16.9 % — HIGH (ref 10.3–14.5)
RBC # FLD: 16.9 % — HIGH (ref 10.3–14.5)
RH IG SCN BLD-IMP: POSITIVE — SIGNIFICANT CHANGE UP
SODIUM SERPL-SCNC: 133 MMOL/L — LOW (ref 135–145)
WBC # BLD: 11.8 K/UL — HIGH (ref 3.8–10.5)
WBC # BLD: 13.6 K/UL — HIGH (ref 3.8–10.5)
WBC # FLD AUTO: 11.8 K/UL — HIGH (ref 3.8–10.5)
WBC # FLD AUTO: 13.6 K/UL — HIGH (ref 3.8–10.5)

## 2018-11-25 PROCEDURE — 78315 BONE IMAGING 3 PHASE: CPT | Mod: 26

## 2018-11-25 PROCEDURE — 99232 SBSQ HOSP IP/OBS MODERATE 35: CPT

## 2018-11-25 RX ORDER — CILOSTAZOL 100 MG/1
100 TABLET ORAL
Qty: 0 | Refills: 0 | Status: DISCONTINUED | OUTPATIENT
Start: 2018-11-25 | End: 2018-12-14

## 2018-11-25 RX ORDER — DEXTROSE MONOHYDRATE, SODIUM CHLORIDE, AND POTASSIUM CHLORIDE 50; .745; 4.5 G/1000ML; G/1000ML; G/1000ML
1000 INJECTION, SOLUTION INTRAVENOUS
Qty: 0 | Refills: 0 | Status: DISCONTINUED | OUTPATIENT
Start: 2018-11-25 | End: 2018-11-27

## 2018-11-25 RX ORDER — POTASSIUM CHLORIDE 20 MEQ
20 PACKET (EA) ORAL
Qty: 0 | Refills: 0 | Status: COMPLETED | OUTPATIENT
Start: 2018-11-25 | End: 2018-11-25

## 2018-11-25 RX ADMIN — SODIUM CHLORIDE 75 MILLILITER(S): 9 INJECTION, SOLUTION INTRAVENOUS at 05:44

## 2018-11-25 RX ADMIN — LISINOPRIL 10 MILLIGRAM(S): 2.5 TABLET ORAL at 05:44

## 2018-11-25 RX ADMIN — DEXTROSE MONOHYDRATE, SODIUM CHLORIDE, AND POTASSIUM CHLORIDE 75 MILLILITER(S): 50; .745; 4.5 INJECTION, SOLUTION INTRAVENOUS at 21:17

## 2018-11-25 RX ADMIN — Medication 20 MILLIEQUIVALENT(S): at 11:28

## 2018-11-25 RX ADMIN — DEXTROSE MONOHYDRATE, SODIUM CHLORIDE, AND POTASSIUM CHLORIDE 75 MILLILITER(S): 50; .745; 4.5 INJECTION, SOLUTION INTRAVENOUS at 09:04

## 2018-11-25 RX ADMIN — GABAPENTIN 200 MILLIGRAM(S): 400 CAPSULE ORAL at 05:44

## 2018-11-25 RX ADMIN — CILOSTAZOL 100 MILLIGRAM(S): 100 TABLET ORAL at 17:49

## 2018-11-25 RX ADMIN — HEPARIN SODIUM 18 UNIT(S)/HR: 5000 INJECTION INTRAVENOUS; SUBCUTANEOUS at 07:22

## 2018-11-25 RX ADMIN — Medication 20 MILLIEQUIVALENT(S): at 09:03

## 2018-11-25 RX ADMIN — GABAPENTIN 200 MILLIGRAM(S): 400 CAPSULE ORAL at 21:16

## 2018-11-25 RX ADMIN — Medication 20 MILLIEQUIVALENT(S): at 10:21

## 2018-11-25 NOTE — PROGRESS NOTE ADULT - SUBJECTIVE AND OBJECTIVE BOX
PAST MEDICAL & SURGICAL HISTORY:  DM (diabetes mellitus)  HTN (hypertension)  Breast CA  No significant past surgical history          REVIEW OF SYSTEMS:  CONSTITUTIONAL: No change in symptoms  EYES: No eye pain, visual disturbances, or discharge  NECK: No pain or stiffness  RESPIRATORY: No cough, wheezing, chills or hemoptysis; No Shortness of Breath  CARDIOVASCULAR: No chest pain, palpitations, passing out, dizziness, or leg swelling  GASTROINTESTINAL: No abdominal or epigastric pain. No nausea, vomiting, or hematemesis; No diarrhea or constipation. No melena or hematochezia.  GENITOURINARY: No dysuria, frequency, hematuria, or incontinence  NEUROLOGICAL: No headaches,   l foot pain     Medications:  MEDICATIONS  (STANDING):  dextrose 5%. 1000 milliLiter(s) (50 mL/Hr) IV Continuous <Continuous>  dextrose 50% Injectable 12.5 Gram(s) IV Push once  gabapentin 200 milliGRAM(s) Oral three times a day  heparin  Infusion 1500 Unit(s)/Hr (18 mL/Hr) IV Continuous <Continuous>  insulin lispro (HumaLOG) corrective regimen sliding scale   SubCutaneous three times a day before meals  lidocaine   Patch 1 Patch Transdermal daily  lisinopril 10 milliGRAM(s) Oral daily  potassium chloride    Tablet ER 20 milliEquivalent(s) Oral every 2 hours  sodium chloride 0.9% with potassium chloride 20 mEq/L 1000 milliLiter(s) (75 mL/Hr) IV Continuous <Continuous>    MEDICATIONS  (PRN):  acetaminophen   Tablet .. 975 milliGRAM(s) Oral every 6 hours PRN Mild Pain (1 - 3)  acetaminophen   Tablet .. 650 milliGRAM(s) Oral every 6 hours PRN Temp greater or equal to 38C (100.4F)  dextrose 40% Gel 15 Gram(s) Oral once PRN Blood Glucose LESS THAN 70 milliGRAM(s)/deciliter  glucagon  Injectable 1 milliGRAM(s) IntraMuscular once PRN Glucose LESS THAN 70 milligrams/deciliter  oxyCODONE    IR 10 milliGRAM(s) Oral every 6 hours PRN moderate and severe pain    	    PHYSICAL EXAM:  T(C): 37.2 (11-25-18 @ 05:43), Max: 37.3 (11-24-18 @ 21:39)  HR: 96 (11-25-18 @ 05:43) (95 - 98)  BP: 139/86 (11-25-18 @ 05:43) (133/79 - 151/85)  RR: 18 (11-25-18 @ 05:43) (18 - 18)  SpO2: 96% (11-25-18 @ 05:43) (96% - 98%)  Wt(kg): --  I&O's Summary      Appearance: Normal	  HEENT:   Normal oral mucosa, PERRL, EOMI	  Lymphatic: No lymphadenopathy  Cardiovascular: Normal S1 S2, No JVD, No murmurs, No edema  Respiratory: Lungs clear to auscultation	  Psychiatry: A & O x 3, Mood & affect appropriate  Gastrointestinal:  Soft, Non-tender, + BS	  Skin: No rashes, No ecchymoses, No cyanosis	  Neurologic: Non-focal  Extremities:left foot vasc / ischemic changes       TELEMETRY: 	    ECG:  	  RADIOLOGY:  OTHER: 	  	  LABS:	 	    CARDIAC MARKERS:                                8.5    13.60 )-----------( 530      ( 25 Nov 2018 08:20 )             27.1     11-25    133<L>  |  97<L>  |  6<L>  ----------------------------<  376<H>  3.1<L>   |  26  |  0.69    Ca    8.9      25 Nov 2018 05:35      proBNP:   Lipid Profile:   HgA1c:   TSH:

## 2018-11-25 NOTE — CONSULT NOTE ADULT - SUBJECTIVE AND OBJECTIVE BOX
vss  ncat  s1s2  cta  soft nt nd    full consult to henny.  will check ca 19-9 and one year follow up. milagro ipmn

## 2018-11-25 NOTE — CONSULT NOTE ADULT - ASSESSMENT
69 y/o F w/ LF ischemia to the midtarsal joint  -Pt seen and evaluated  -LF dry gangrene to all toes with progression of ischemia proximally, demarcating near the midtarsal joint  -LF is cold to touch and edematous  -No open wounds, no clinical signs of infection  -Vasc suspecting Buerger's disease, possible sympathectomy  -Foot does not appear salvageable at this time as the ischemia has progressed proximally  -Attending discussed case w/ Dr. Lema

## 2018-11-25 NOTE — PROGRESS NOTE ADULT - ASSESSMENT
68F with DM, HTN, weight loss, admitted on 11/14/18 with left foot pain of several months duration      Left foot pain/ fever/ leukocytosis    Treated empirically for cellulitis with 7 day course of abx through 7/21    Seen by rheumatology.  Pathology with "endarteritis obliterans". Rheumatology DDx is  Buergers thromboangiitis obliterans, aPS or livedoid vasculopathy .    Presentation chronicity and lack of response to abx is suggestive of non infectious etiology. Pt non toxic appearing.     Continue to observe off abx. 68F with DM, HTN, weight loss, admitted on 11/14/18 with left foot pain of several months duration      Left foot pain/ fever/ leukocytosis    Treated empirically for cellulitis with 7 day course of abx through 7/21    Seen by rheumatology.  Pathology with "endarteritis obliterans". Rheumatology DDx is  Buergers thromboangiitis obliterans, aPS or livedoid vasculopathy .    Presentation chronicity and lack of response to abx is suggestive of non infectious etiology. Pt non toxic appearing.     Continue to observe off abx.    Left foot is red, but not cellulitis, has ischemic changes to toes.

## 2018-11-25 NOTE — CONSULT NOTE ADULT - ASSESSMENT
Imp:  69 yo female with h/o HTN, DM and active tobacco use with LLE ischemia which has been progressive over the last few months.  Bx of skin revealed fairly striking vascular disease with "endarteritis obliterans".  I think the most likely diagnosis is Buergers Dz or thromboangiitis obliterans, though aPS or livedoid vasculopathy is also possible.  THere does not seem to be other systemic features to suggest a systemic vasculitis.  Workup for other clotting disease has been negative    Rec:  Smoking cessation was discussed in detail with patient; she has not smoked since admission and will try to maintain this; whatever services can be provided to her in this would be helpful  I would add a vasodilator, eg, calcium blocker, Pletal or even a PDE5 inhibitor such as sildenafil; all can be helpful, though not curative without smoking cessation  Check Anti phospholipid antibodies

## 2018-11-25 NOTE — PROGRESS NOTE ADULT - ASSESSMENT
69 yo woman with a hx of DM, HTN and breast CA s/p lumpectomy presented with left foot pain, swelling and redness. CTA obtained which showed patent vessels to ankles. Currently on medicine service undergoing work up for hypercoaguability/vasculitis    - Patient with palpable LLE PT, MRA LLE unremarkable, showing widely patent vessels throughout. No vascular surgery intervention indicated at this time  - Care per primary team, multispecialty work up.  - Will continue to follow to monitor left foot ischemia, pt may require amputation in the future    Vascular surgery  [id number] 69 yo woman with a hx of DM, HTN and breast CA s/p lumpectomy presented with left foot pain, swelling and redness. CTA obtained which showed patent vessels to ankles. Currently on medicine service undergoing work up for hypercoaguability/vasculitis    - Patient with palpable LLE PT, MRA LLE unremarkable, showing widely patent vessels throughout. No vascular surgery intervention indicated at this time  - Care per primary team, multispecialty work up.  - Vascular surgery will sign off at present time. Please reconsult for changes in physical exam or new patient concerns.    Thank you for the opportunity to consult on this patient.     Vascular surgery  [id number] 69 yo woman with a hx of DM, HTN and breast CA s/p lumpectomy presented with left foot pain, swelling and redness. CTA obtained which showed patent vessels to ankles. Currently on medicine service undergoing work up for hypercoaguability/vasculitis    - Patient with palpable LLE PT, MRA LLE unremarkable, showing widely patent vessels throughout. No vascular surgery intervention indicated at this time  - Care per primary team, multispecialty work up.  -     Thank you for the opportunity to consult on this patient.     Vascular surgery  [id number]

## 2018-11-25 NOTE — CONSULT NOTE ADULT - SUBJECTIVE AND OBJECTIVE BOX
Podiatry pager #: [phone] ([place])/ [phone] ([facility])    Patient is a 68y old  Female who presents with a chief complaint of Left foot cellulitis (25 Nov 2018 12:53)      HPI:  69 yo woman with a hx of DM and HTN presents for evaluation of left foot pain, swelling and redness x1 month. The patient saw Dr. Lema in clinic today and was advised to present to ED for evaluation. The pain is only minimally alleviated by OTC pain medications. She reports fevers at home, denies chills, N/V, or drainage from the left foot. The patient is minimally ambulatory w/ cane at home.    ED Course: Patient febrile to 101.3, tachycardic to 120s, improved after IV fluid administration. ED noted streaking up left leg, begun on broad-spectrum abx and cultures sent. (14 Nov 2018 00:22)      PAST MEDICAL & SURGICAL HISTORY:  DM (diabetes mellitus)  HTN (hypertension)  Breast CA  No significant past surgical history      MEDICATIONS  (STANDING):  cilostazol 100 milliGRAM(s) Oral two times a day  dextrose 5%. 1000 milliLiter(s) (50 mL/Hr) IV Continuous <Continuous>  dextrose 50% Injectable 12.5 Gram(s) IV Push once  gabapentin 200 milliGRAM(s) Oral three times a day  heparin  Infusion 1500 Unit(s)/Hr (18 mL/Hr) IV Continuous <Continuous>  insulin lispro (HumaLOG) corrective regimen sliding scale   SubCutaneous three times a day before meals  lidocaine   Patch 1 Patch Transdermal daily  lisinopril 10 milliGRAM(s) Oral daily  sodium chloride 0.9% with potassium chloride 20 mEq/L 1000 milliLiter(s) (75 mL/Hr) IV Continuous <Continuous>    MEDICATIONS  (PRN):  acetaminophen   Tablet .. 975 milliGRAM(s) Oral every 6 hours PRN Mild Pain (1 - 3)  acetaminophen   Tablet .. 650 milliGRAM(s) Oral every 6 hours PRN Temp greater or equal to 38C (100.4F)  dextrose 40% Gel 15 Gram(s) Oral once PRN Blood Glucose LESS THAN 70 milliGRAM(s)/deciliter  glucagon  Injectable 1 milliGRAM(s) IntraMuscular once PRN Glucose LESS THAN 70 milligrams/deciliter  oxyCODONE    IR 10 milliGRAM(s) Oral every 6 hours PRN moderate and severe pain      Allergies    No Known Allergies    Intolerances        VITALS:    Vital Signs Last 24 Hrs  T(C): 37.2 (25 Nov 2018 05:43), Max: 37.3 (24 Nov 2018 21:39)  T(F): 99 (25 Nov 2018 05:43), Max: 99.2 (24 Nov 2018 21:39)  HR: 96 (25 Nov 2018 05:43) (95 - 96)  BP: 139/86 (25 Nov 2018 05:43) (133/79 - 139/86)  BP(mean): --  RR: 18 (25 Nov 2018 05:43) (18 - 18)  SpO2: 96% (25 Nov 2018 05:43) (96% - 97%)    LABS:                          8.5    13.60 )-----------( 530      ( 25 Nov 2018 08:20 )             27.1       11-25    133<L>  |  97<L>  |  6<L>  ----------------------------<  376<H>  3.1<L>   |  26  |  0.69    Ca    8.9      25 Nov 2018 05:35        CAPILLARY BLOOD GLUCOSE      POCT Blood Glucose.: 122 mg/dL (25 Nov 2018 11:54)  POCT Blood Glucose.: 129 mg/dL (25 Nov 2018 08:44)  POCT Blood Glucose.: 133 mg/dL (24 Nov 2018 22:17)  POCT Blood Glucose.: 142 mg/dL (24 Nov 2018 17:07)      PTT - ( 25 Nov 2018 05:35 )  PTT:98.2 SEC    LOWER EXTREMITY PHYSICAL EXAM:    Vascular: DP/PT 0/4 B/L, no CFT 2/2 dry gangrene of digits 1-5 on LF, Temperature gradient warm to cold on L foot  Neuro: Epicritic sensation intact to the level of digits B/L  Musculoskeletal/Ortho: no gross deformities  Skin: L foot dry gangrene to toes 1-5 w/ ischemia demarcated at the midtarsal joint, foot is cool to touch and edematous, very tender to touch. No open lesions, no malodor, no clinical signs of infection.     RADIOLOGY & ADDITIONAL STUDIES:    < from: Xray Foot AP + Lateral, Left (11.14.18 @ 01:24) >  EXAM:  RAD ANKLE 2 VIEWS LEFT      EXAM:  RAD FOOT 2 VIEWS LEFT      EXAM:  RAD TIB-FIB LT        PROCEDURE DATE:  Nov 14 2018         INTERPRETATION:  CLINICAL INFORMATION: Left foot pain. Concern for   osteomyelitis.    TECHNIQUE: 2 views of the left leg, 2 views of the left ankle, and 2   views of the left foot     COMPARISON: None    FINDINGS:    Left leg: There is no acute fracture or dislocation. The joint spaces are   preserved. Soft tissues are unremarkable. There is no radiographic   evidence of osteomyelitis.    Left ankle: There is no acute fracture or dislocation. The joint spaces   are preserved. Soft tissues are unremarkable. There is no radiographic   evidence of osteomyelitis.    Left foot:   There are no fractures. No subcutaneous air or bone   destruction to suggest osteomyelitis.    IMPRESSION:    There is no plain film radiographic evidence of osteomyelitis.              CONRAD BLAKE M.D., RADIOLOGY RESIDENT  This document has been electronically signed.  PAVEL GALLEGO, ATTENDING RADIOLOGIST  This document has been electronically signed. Nov 14 2018  5:57AM              < end of copied text >    < from: CT Angio Abd Aorta w/run-off w/ IV Cont (11.14.18 @ 09:03) >  EXAM:  CT ANGIO ABD AOR W RUN(W)AW IC        PROCEDURE DATE:  Nov 14 2018         INTERPRETATION:  CLINICAL INFORMATION: Foot pain and poor circulation.    CT ANGIOGRAM ABDOMEN, PELVIS, AND LOWER EXTREMITIES:    TECHNIQUE:   Initially, nonenhanced CT was obtained through the calves. Then,   following the rapid administration of intravenous contrast, CT   angiography was performed through the abdomen, pelvis, and lower   extremities down to the toes.  Delayed images through the calves were   also obtained. Sagittal and coronal reformats as well as 3D   reconstructions were performed.    110 mls of Omnipaque 350 was administered intravenously without   complication and 15 mls were discarded.    COMPARISON: None available.    FINDINGS:    CENTRAL ARTERIAL SYSTEM:     Normal caliber abdominal aorta without evidence of dissection or   aneurysm. The celiac artery, SMA, bilateral renal arteries and LAWRENCE are   patent and normal in caliber.    RIGHT LOWER EXTREMITY:    Mild calcified plaque of the right common iliac artery which is patent   and normal in caliber. Right internal and external iliac arteries are   patent. Patent right common femoral artery. Profunda femoral artery is   patent. The superficial femoral artery is patent. The popliteal artery is   patent. Patent anterior tibial artery and tibioperoneal trunk. Patent   peroneal artery to the ankle. Patent posterior tibial artery.      LEFT LOWER EXTREMITY:    Calcified plaque involving the left common iliac artery which is patent.   Patent internal and external iliac arteries. Common femoral artery and   profunda femoral artery are patent. The superficial femoral artery is   patent. The popliteal artery is patent. The anterior tibial artery and   tibioperoneal trunk are patent. Patent peroneal artery to the ankle.   Patent posterior tibial artery.    ADDITIONAL FINDINGS:     Multiple hepatic cysts, the largest in the right hepatic lobe measuring   7.0 cm (series 5 image 22). Right adrenal gland thickening. Left adrenal   gland lesion measures 3.0 x 2.8 cm (series 5 image 27) and is   indeterminate on this examination. Bilateral renal cysts and   hypoattenuating lesions that are too small to characterize. Nonspecific   left inguinal lymph nodes measuring up to 1.4 x 1.2 cm (series 5 image   119). Edema involving the left lower extremity with overlying skin   thickening, predominantly in the lateral compartment. No subcutaneous   emphysema.      IMPRESSION:     Normal caliber and patent abdominal aorta.    Three vessel run off to both lower extremities. Left lower extremity   edema without subcutaneous emphysema.    Indeterminate left adrenal gland lesion measuring 3.0 cm. This can be   further evaluated with dedicated MRI adrenal gland protocol.                  KRISTINA ARDON M.D., ATTENDING RADIOLOGIST  This document has been electronically signed. Nov 14 2018 10:58AM                  < end of copied text >

## 2018-11-25 NOTE — PROGRESS NOTE ADULT - ATTENDING COMMENTS
Seen ex'ed during nuclear scan.  Resid pain in L foot  L foot progressing demarcation w pregang changes cele dist and to mid foot.   Palp PT pulse  No gross infection    A/P:   L foot isch  Atypical   Patent art perfusion to ankle. Poor pedal flow.  No good targets for revasc  Rec:   Cont multispec mgmt/teague  May end up w major amp if foot/ankle can't be salvaged - pt/daughter made aware again. Seen ex'ed during nuclear scan.  Resid pain in L foot  L foot progressing demarcation w pregang changes cele dist and to mid foot.   Palp PT pulse  No gross infection    A/P:   L foot isch  Atypical   Patent art perfusion to ankle. Poor pedal flow.  No good targets for revasc  Rec:   Cont multispec mgmt/teague  Vasodilators recs noted.    If Buerger's dz is confirmed (atypical anatomy for Buergers in this pt)- then can consider sympathectomy although givene the rapid progression of isch I doubt efficacy of sympathectomy.  May end up w major amp if foot/ankle can't be salvaged - pt/daughter made aware again.

## 2018-11-25 NOTE — PROGRESS NOTE ADULT - SUBJECTIVE AND OBJECTIVE BOX
Follow Up:      Inverval History/ROS:Patient is a 68y old  Female who presents with a chief complaint of Left foot cellulitis (25 Nov 2018 10:40)  Low grade temp on 11/23.    C/o left foot pain.      Allergies    No Known Allergies    Intolerances        ANTIMICROBIALS:      OTHER MEDS:  acetaminophen   Tablet .. 975 milliGRAM(s) Oral every 6 hours PRN  acetaminophen   Tablet .. 650 milliGRAM(s) Oral every 6 hours PRN  cilostazol 100 milliGRAM(s) Oral two times a day  dextrose 40% Gel 15 Gram(s) Oral once PRN  dextrose 5%. 1000 milliLiter(s) IV Continuous <Continuous>  dextrose 50% Injectable 12.5 Gram(s) IV Push once  gabapentin 200 milliGRAM(s) Oral three times a day  glucagon  Injectable 1 milliGRAM(s) IntraMuscular once PRN  heparin  Infusion 1500 Unit(s)/Hr IV Continuous <Continuous>  insulin lispro (HumaLOG) corrective regimen sliding scale   SubCutaneous three times a day before meals  lidocaine   Patch 1 Patch Transdermal daily  lisinopril 10 milliGRAM(s) Oral daily  oxyCODONE    IR 10 milliGRAM(s) Oral every 6 hours PRN  sodium chloride 0.9% with potassium chloride 20 mEq/L 1000 milliLiter(s) IV Continuous <Continuous>      Vital Signs Last 24 Hrs  T(C): 37.2 (25 Nov 2018 05:43), Max: 37.3 (24 Nov 2018 21:39)  T(F): 99 (25 Nov 2018 05:43), Max: 99.2 (24 Nov 2018 21:39)  HR: 96 (25 Nov 2018 05:43) (95 - 96)  BP: 139/86 (25 Nov 2018 05:43) (133/79 - 139/86)  BP(mean): --  RR: 18 (25 Nov 2018 05:43) (18 - 18)  SpO2: 96% (25 Nov 2018 05:43) (96% - 97%)    PHYSICAL EXAM:  General: [x ] non-toxic  HEAD/EYES: [ ] PERRL [x ] white sclera [ ] icterus  ENT:  [ ] normal [x ] supple [ ] thrush [ ] pharyngeal exudate  Cardiovascular:   [ ] murmur [x ] normal [ ] PPM/AICD  Respiratory:  [ ] clear to ausculation bilaterally  GI:  [x ] soft, non-tender, normal bowel sounds  :  [ ] blackburn x[ ] no CVA tenderness   Musculoskeletal:  [ ] no synovitis  Neurologic:  [ ] non-focal exam   Skin:  [x ] no rash  Lymph: [x ] no lymphadenopathy  Psychiatric:  [ ] appropriate affect [ ] alert & oriented  Lines:  [x ] no phlebitis [ ] central line                                8.5    13.60 )-----------( 530      ( 25 Nov 2018 08:20 )             27.1       11-25    133<L>  |  97<L>  |  6<L>  ----------------------------<  376<H>  3.1<L>   |  26  |  0.69    Ca    8.9      25 Nov 2018 05:35            MICROBIOLOGY:    RADIOLOGY: Follow Up:      Inverval History/ROS:Patient is a 68y old  Female who presents with a chief complaint of Left foot cellulitis (25 Nov 2018 10:40)  Low grade temp on 11/23.    C/o left foot pain.      Allergies    No Known Allergies    Intolerances        ANTIMICROBIALS:      OTHER MEDS:  acetaminophen   Tablet .. 975 milliGRAM(s) Oral every 6 hours PRN  acetaminophen   Tablet .. 650 milliGRAM(s) Oral every 6 hours PRN  cilostazol 100 milliGRAM(s) Oral two times a day  dextrose 40% Gel 15 Gram(s) Oral once PRN  dextrose 5%. 1000 milliLiter(s) IV Continuous <Continuous>  dextrose 50% Injectable 12.5 Gram(s) IV Push once  gabapentin 200 milliGRAM(s) Oral three times a day  glucagon  Injectable 1 milliGRAM(s) IntraMuscular once PRN  heparin  Infusion 1500 Unit(s)/Hr IV Continuous <Continuous>  insulin lispro (HumaLOG) corrective regimen sliding scale   SubCutaneous three times a day before meals  lidocaine   Patch 1 Patch Transdermal daily  lisinopril 10 milliGRAM(s) Oral daily  oxyCODONE    IR 10 milliGRAM(s) Oral every 6 hours PRN  sodium chloride 0.9% with potassium chloride 20 mEq/L 1000 milliLiter(s) IV Continuous <Continuous>      Vital Signs Last 24 Hrs  T(C): 37.2 (25 Nov 2018 05:43), Max: 37.3 (24 Nov 2018 21:39)  T(F): 99 (25 Nov 2018 05:43), Max: 99.2 (24 Nov 2018 21:39)  HR: 96 (25 Nov 2018 05:43) (95 - 96)  BP: 139/86 (25 Nov 2018 05:43) (133/79 - 139/86)  BP(mean): --  RR: 18 (25 Nov 2018 05:43) (18 - 18)  SpO2: 96% (25 Nov 2018 05:43) (96% - 97%)    PHYSICAL EXAM:  General: [x ] non-toxic  HEAD/EYES: [ ] PERRL [x ] white sclera [ ] icterus  ENT:  [ ] normal [x ] supple [ ] thrush [ ] pharyngeal exudate  Cardiovascular:   [ ] murmur [x ] normal [ ] PPM/AICD  Respiratory:  [ ] clear to ausculation bilaterally  GI:  [x ] soft, non-tender, normal bowel sounds  :  [ ] blackburn x[ ] no CVA tenderness   Musculoskeletal:  [ ] no synovitis  Neurologic:  [ ] non-focal exam   Skin:  [x ] no rash  left foot is red, cool to touch, ischemic changes to toes  Lymph: [x ] no lymphadenopathy  Psychiatric:  [ ] appropriate affect [ ] alert & oriented  Lines:  [x ] no phlebitis [ ] central line                                8.5    13.60 )-----------( 530      ( 25 Nov 2018 08:20 )             27.1       11-25    133<L>  |  97<L>  |  6<L>  ----------------------------<  376<H>  3.1<L>   |  26  |  0.69    Ca    8.9      25 Nov 2018 05:35            MICROBIOLOGY:    RADIOLOGY:

## 2018-11-25 NOTE — CONSULT NOTE ADULT - REASON FOR ADMISSION
Left foot cellulitis

## 2018-11-25 NOTE — CONSULT NOTE ADULT - ASSESSMENT
67yo F with DM and HTN, tobacco use, p/w L foot pain, swelling and  tenderness in fluctuating degree for several months. Had been milder but progressively worsened recently. She was seen by Dr Lema and had a bx of a lesion on the RLE that showed endarteritis obliterans. There was concern for VTE but doppler and MRA neg for thrombosis or occlusion. Pt was empirically started on hep gtt while awaiting APLS labs, which are still pending.    1. cyanosis -- vasculitis most likely related to endarteritis obliterans. Low suspicion for APLS.   -- since pt has no bleeding issues at this time, can cont empiric hep gtt until APLS labs have resulted, stop if neg  -- f/u vascular, ID, and rheum  -- monitor clinically    2. anemia -- MCV low, ferritin elevated to 500s but may be seen in acute setting. HbEP neg  -- may still have KRIS vs alpha thal  -- would need to repeat Fe panel and check alpha thal as outpt  -- adequate, monitor  -- complete w/u at this time, check SPEP, TANNER, hapto, LDH, B12, folate    3. thrombocytosis -- likely reactive, monitor for now    Plan and impression d/w pt and primary team, will follow, [phone] 67yo F with DM and HTN, tobacco use, p/w L foot pain, swelling and  tenderness in fluctuating degree for several months. Had been milder but progressively worsened recently. She was seen by Dr Lema and had a bx of a lesion on the RLE that showed endarteritis obliterans. There was concern for VTE but doppler and MRA neg for thrombosis or occlusion. Pt was empirically started on hep gtt while awaiting APLS labs, which are still pending.    1. cyanosis -- vasculitis most likely related to endarteritis obliterans. Low suspicion for APLS.   -- since pt has no bleeding issues at this time, can cont empiric hep gtt until APLS labs have resulted, stop if neg  -- f/u vascular, ID, and rheum  -- monitor clinically    2. anemia -- MCV low, ferritin elevated to 500s but may be seen in acute setting. HbEP neg  -- may still have KRIS vs alpha thal  -- would need to repeat Fe panel and check alpha thal as outpt  -- adequate, monitor  -- complete w/u at this time, check SPEP, TANNER, hapto, LDH, B12, folate    3. thrombocytosis -- likely reactive, monitor for now    4. possible side branch IPMN -- consider GI consult, will need f/u for this    Plan and impression d/w pt and primary team, will follow, [phone]

## 2018-11-25 NOTE — CONSULT NOTE ADULT - SUBJECTIVE AND OBJECTIVE BOX
Reason for consult: cyanosis of the LLE, 2nd opinion    HPI: 67yo F with DM and HTN, tobacco use, p/w L foot pain, swelling and  tenderness in fluctuating degree for several months. Had been milder but progressively worsened recently. She was seen by Dr Lema and had a bx of a lesion on the RLE that showed endarteritis obliterans. There was concern for VTE but doppler and MRA neg for thrombosis or occlusion. Pt was empirically started on hep gtt while awaiting APLS labs, which are still pending. She was seen by royce and requested a second opinion. Currently reports pain in the L foot, cyanotic, swelling, mostly unchanged. No other new complaints, no bleeding.    PAST MEDICAL & SURGICAL HISTORY:  DM (diabetes mellitus)  HTN (hypertension)  Breast CA  No significant past surgical history      FAMILY HISTORY:  No pertinent family history in first degree relatives      Alochol: Denied  Smoking: +smoker  Drug Use: Denied  Marital Status:         Allergies    No Known Allergies    Intolerances        MEDICATIONS  (STANDING):  cilostazol 100 milliGRAM(s) Oral two times a day  dextrose 5%. 1000 milliLiter(s) (50 mL/Hr) IV Continuous <Continuous>  dextrose 50% Injectable 12.5 Gram(s) IV Push once  gabapentin 200 milliGRAM(s) Oral three times a day  heparin  Infusion 1500 Unit(s)/Hr (18 mL/Hr) IV Continuous <Continuous>  insulin lispro (HumaLOG) corrective regimen sliding scale   SubCutaneous three times a day before meals  lidocaine   Patch 1 Patch Transdermal daily  lisinopril 10 milliGRAM(s) Oral daily  sodium chloride 0.9% with potassium chloride 20 mEq/L 1000 milliLiter(s) (75 mL/Hr) IV Continuous <Continuous>    MEDICATIONS  (PRN):  acetaminophen   Tablet .. 975 milliGRAM(s) Oral every 6 hours PRN Mild Pain (1 - 3)  acetaminophen   Tablet .. 650 milliGRAM(s) Oral every 6 hours PRN Temp greater or equal to 38C (100.4F)  dextrose 40% Gel 15 Gram(s) Oral once PRN Blood Glucose LESS THAN 70 milliGRAM(s)/deciliter  glucagon  Injectable 1 milliGRAM(s) IntraMuscular once PRN Glucose LESS THAN 70 milligrams/deciliter  oxyCODONE    IR 10 milliGRAM(s) Oral every 6 hours PRN moderate and severe pain      ROS  No fever, sweats, chills  No epistaxis, HA, sore throat  No CP, SOB, cough, sputum  No n/v/d, abd pain, melena, hematochezia  No edema  No anxiety  No back pain  No bleeding, bruising  No dysuria, hematuria    T(C): 37.2 (11-25-18 @ 05:43), Max: 37.3 (11-24-18 @ 21:39)  HR: 96 (11-25-18 @ 05:43) (95 - 98)  BP: 139/86 (11-25-18 @ 05:43) (133/79 - 151/85)  RR: 18 (11-25-18 @ 05:43) (18 - 18)  SpO2: 96% (11-25-18 @ 05:43) (96% - 98%)  Wt(kg): --    PE  NAD  Awake, alert  Anicteric, MMM  RRR  CTAB  Abd soft, NT, ND  RLE without abnml. LLE foot cyanotic and cool up to about ankle, worst at toes, tender to touch  FROM                          8.5    13.60 )-----------( 530      ( 25 Nov 2018 08:20 )             27.1       11-25    133<L>  |  97<L>  |  6<L>  ----------------------------<  376<H>  3.1<L>   |  26  |  0.69    Ca    8.9      25 Nov 2018 05:35

## 2018-11-26 LAB
APTT BLD: 114.3 SEC — HIGH (ref 27.5–36.3)
APTT BLD: 89.2 SEC — HIGH (ref 27.5–36.3)
APTT BLD: 94.3 SEC — HIGH (ref 27.5–36.3)
BUN SERPL-MCNC: 7 MG/DL — SIGNIFICANT CHANGE UP (ref 7–23)
CALCIUM SERPL-MCNC: 9.7 MG/DL — SIGNIFICANT CHANGE UP (ref 8.4–10.5)
CATECHOLS UR-MCNC: SIGNIFICANT CHANGE UP
CATECHOLS UR-MCNC: SIGNIFICANT CHANGE UP
CHLORIDE SERPL-SCNC: 101 MMOL/L — SIGNIFICANT CHANGE UP (ref 98–107)
CO2 SERPL-SCNC: 24 MMOL/L — SIGNIFICANT CHANGE UP (ref 22–31)
CREAT SERPL-MCNC: 0.78 MG/DL — SIGNIFICANT CHANGE UP (ref 0.5–1.3)
FOLATE SERPL-MCNC: 9.8 NG/ML — SIGNIFICANT CHANGE UP (ref 4.7–20)
GLUCOSE BLDC GLUCOMTR-MCNC: 107 MG/DL — HIGH (ref 70–99)
GLUCOSE BLDC GLUCOMTR-MCNC: 124 MG/DL — HIGH (ref 70–99)
GLUCOSE BLDC GLUCOMTR-MCNC: 126 MG/DL — HIGH (ref 70–99)
GLUCOSE BLDC GLUCOMTR-MCNC: 136 MG/DL — HIGH (ref 70–99)
GLUCOSE SERPL-MCNC: 104 MG/DL — HIGH (ref 70–99)
HAPTOGLOB SERPL-MCNC: 404 MG/DL — HIGH (ref 34–200)
HCT VFR BLD CALC: 27.3 % — LOW (ref 34.5–45)
HCT VFR BLD CALC: 27.4 % — LOW (ref 34.5–45)
HGB BLD-MCNC: 8.3 G/DL — LOW (ref 11.5–15.5)
HGB BLD-MCNC: 8.4 G/DL — LOW (ref 11.5–15.5)
LDH SERPL L TO P-CCNC: 152 U/L — SIGNIFICANT CHANGE UP (ref 135–225)
MCHC RBC-ENTMCNC: 23.5 PG — LOW (ref 27–34)
MCHC RBC-ENTMCNC: 23.8 PG — LOW (ref 27–34)
MCHC RBC-ENTMCNC: 30.3 % — LOW (ref 32–36)
MCHC RBC-ENTMCNC: 30.8 % — LOW (ref 32–36)
MCV RBC AUTO: 77.3 FL — LOW (ref 80–100)
MCV RBC AUTO: 77.6 FL — LOW (ref 80–100)
NRBC # FLD: 0 — SIGNIFICANT CHANGE UP
NRBC # FLD: 0 — SIGNIFICANT CHANGE UP
PLATELET # BLD AUTO: 559 K/UL — HIGH (ref 150–400)
PLATELET # BLD AUTO: 568 K/UL — HIGH (ref 150–400)
PMV BLD: 9.3 FL — SIGNIFICANT CHANGE UP (ref 7–13)
PMV BLD: 9.9 FL — SIGNIFICANT CHANGE UP (ref 7–13)
POTASSIUM SERPL-MCNC: 4.3 MMOL/L — SIGNIFICANT CHANGE UP (ref 3.5–5.3)
POTASSIUM SERPL-SCNC: 4.3 MMOL/L — SIGNIFICANT CHANGE UP (ref 3.5–5.3)
PROT SERPL-MCNC: 6.3 G/DL — SIGNIFICANT CHANGE UP (ref 6–8.3)
RBC # BLD: 3.53 M/UL — LOW (ref 3.8–5.2)
RBC # BLD: 3.53 M/UL — LOW (ref 3.8–5.2)
RBC # FLD: 16.9 % — HIGH (ref 10.3–14.5)
RBC # FLD: 16.9 % — HIGH (ref 10.3–14.5)
SODIUM SERPL-SCNC: 136 MMOL/L — SIGNIFICANT CHANGE UP (ref 135–145)
VIT B12 SERPL-MCNC: 406 PG/ML — SIGNIFICANT CHANGE UP (ref 200–900)
WBC # BLD: 14.28 K/UL — HIGH (ref 3.8–10.5)
WBC # BLD: 14.56 K/UL — HIGH (ref 3.8–10.5)
WBC # FLD AUTO: 14.28 K/UL — HIGH (ref 3.8–10.5)
WBC # FLD AUTO: 14.56 K/UL — HIGH (ref 3.8–10.5)

## 2018-11-26 PROCEDURE — 86334 IMMUNOFIX E-PHORESIS SERUM: CPT | Mod: 26

## 2018-11-26 PROCEDURE — 84165 PROTEIN E-PHORESIS SERUM: CPT | Mod: 26

## 2018-11-26 PROCEDURE — 99232 SBSQ HOSP IP/OBS MODERATE 35: CPT

## 2018-11-26 RX ORDER — HEPARIN SODIUM 5000 [USP'U]/ML
1600 INJECTION INTRAVENOUS; SUBCUTANEOUS
Qty: 25000 | Refills: 0 | Status: DISCONTINUED | OUTPATIENT
Start: 2018-11-26 | End: 2018-11-26

## 2018-11-26 RX ORDER — HEPARIN SODIUM 5000 [USP'U]/ML
1600 INJECTION INTRAVENOUS; SUBCUTANEOUS
Qty: 25000 | Refills: 0 | Status: DISCONTINUED | OUTPATIENT
Start: 2018-11-26 | End: 2018-11-30

## 2018-11-26 RX ADMIN — HEPARIN SODIUM 16 UNIT(S)/HR: 5000 INJECTION INTRAVENOUS; SUBCUTANEOUS at 23:20

## 2018-11-26 RX ADMIN — GABAPENTIN 200 MILLIGRAM(S): 400 CAPSULE ORAL at 13:30

## 2018-11-26 RX ADMIN — LISINOPRIL 10 MILLIGRAM(S): 2.5 TABLET ORAL at 05:31

## 2018-11-26 RX ADMIN — HEPARIN SODIUM 18 UNIT(S)/HR: 5000 INJECTION INTRAVENOUS; SUBCUTANEOUS at 07:20

## 2018-11-26 RX ADMIN — HEPARIN SODIUM 16 UNIT(S)/HR: 5000 INJECTION INTRAVENOUS; SUBCUTANEOUS at 08:53

## 2018-11-26 RX ADMIN — HEPARIN SODIUM 16 UNIT(S)/HR: 5000 INJECTION INTRAVENOUS; SUBCUTANEOUS at 16:14

## 2018-11-26 RX ADMIN — GABAPENTIN 200 MILLIGRAM(S): 400 CAPSULE ORAL at 05:31

## 2018-11-26 RX ADMIN — GABAPENTIN 200 MILLIGRAM(S): 400 CAPSULE ORAL at 21:25

## 2018-11-26 RX ADMIN — DEXTROSE MONOHYDRATE, SODIUM CHLORIDE, AND POTASSIUM CHLORIDE 75 MILLILITER(S): 50; .745; 4.5 INJECTION, SOLUTION INTRAVENOUS at 08:54

## 2018-11-26 RX ADMIN — CILOSTAZOL 100 MILLIGRAM(S): 100 TABLET ORAL at 05:31

## 2018-11-26 RX ADMIN — CILOSTAZOL 100 MILLIGRAM(S): 100 TABLET ORAL at 17:43

## 2018-11-26 NOTE — PROGRESS NOTE ADULT - ASSESSMENT
69 yo F w pmhx of T2DM, HTN presenting with 1 month of LLE swelling and discoloration admitted for  presumed cellulitis       seen by id and abs stopped  however NM bone scan ? osteo vs gangrene  await ID f/u reg restarting      ·  Problem: HTN (hypertension).  Recommendation: BP well controlled  c/w home med of lisinopril.       ·  Problem: DM (diabetes mellitus).  Recommendation: Blood glucose well controlled  C/w sliding scale insulin.       ·  Problem: Microcytic anemia.  Recommendation: likely KRIS, outpt f/u    ·  Problem: Ischemia of LLE - c/w Heparin gtt for now, await results of vasculitis/APLS w/u  Rheum and Heme appreciated, await vasculitis w/u results  Derm appreciated, no role of repeat skin bx at this time  MRA LLE  done results noted    2nd oppinion Rheum and Heme noted, w/u APLS still pending  Pletal started per Rheum    Adrenal incidenteloma - MRI reviewed, appears to be adrenal adenoma, Surg f/u    discussed case w/ daughter at length  yesterday  seems as thou amputation of toes or foot maybe likely  discussed w/ daughter at length over the phone  discussed case w/vascular Dr Hamilton

## 2018-11-26 NOTE — PROGRESS NOTE ADULT - SUBJECTIVE AND OBJECTIVE BOX
Follow Up:  dry gangrene of left foot    Interval History/ROS: very modest discomfort    Allergies  No Known Allergies    ANTIMICROBIALS:      OTHER MEDS:  MEDICATIONS  (STANDING):  acetaminophen   Tablet .. 975 every 6 hours PRN  acetaminophen   Tablet .. 650 every 6 hours PRN  cilostazol 100 two times a day  dextrose 40% Gel 15 once PRN  dextrose 50% Injectable 12.5 once  gabapentin 200 three times a day  glucagon  Injectable 1 once PRN  heparin  Infusion 1600 <Continuous>  insulin lispro (HumaLOG) corrective regimen sliding scale  three times a day before meals  lisinopril 10 daily  oxyCODONE    IR 10 every 6 hours PRN    Vital Signs Last 24 Hrs  T(C): 37.3 (26 Nov 2018 17:42), Max: 37.3 (26 Nov 2018 17:42)  T(F): 99.1 (26 Nov 2018 17:42), Max: 99.1 (26 Nov 2018 17:42)  HR: 96 (26 Nov 2018 17:42) (96 - 98)  BP: 138/81 (26 Nov 2018 17:42) (127/70 - 138/81)  BP(mean): --  RR: 18 (26 Nov 2018 17:42) (18 - 18)  SpO2: 100% (26 Nov 2018 17:42) (95% - 100%)    PHYSICAL EXAM:  General: WN/WD NAD, Non-toxic  Neurology: A&Ox3, nonfocal  Respiratory: Clear to auscultation bilaterally  CV: RRR, S1S2, no murmurs, rubs or gallops  Abdominal: Soft, Non-tender, non-distended  Extremities: No edema,  left foot cold and dark with dry gangreous changes on toes and distal forefoot  Line Sites: Clear  Skin: No rash                        8.4    14.56 )-----------( 568      ( 26 Nov 2018 14:50 )             27.3       11-26    136  |  101  |  7   ----------------------------<  104<H>  4.3   |  24  |  0.78    Ca    9.7      26 Nov 2018 04:00    TPro  6.3  /  Alb  x   /  TBili  x   /  DBili  x   /  AST  x   /  ALT  x   /  AlkPhos  x   11-26          MICROBIOLOGY:  THROAT  11-19-18 --  --  --      BLOOD PERIPHERAL  11-18-18 --  --  --      BLOOD  11-14-18 --  --  --      RADIOLOGY:  < from: NM 3-Phase Bone Scan (11.25.18 @ 16:18) >  IMPRESSION: Abnormal bone scan.    Absent flow, blood pool and tracer uptake in the left mid foot and left   forefoot compatible with gangrene/osteomyelitis.    Small focal uptake in the first metatarsal phalangeal region of the right   foot, probably arthritic.    < end of copied text >      Dayne Briseno MD; Division of Infectious Disease; Pager: [phone]; nights and weekends: [phone]

## 2018-11-26 NOTE — PROGRESS NOTE ADULT - SUBJECTIVE AND OBJECTIVE BOX
RENATA DIXON:[id number],   68yFemale followed for:  No Known Allergies    PAST MEDICAL & SURGICAL HISTORY:  DM (diabetes mellitus)  HTN (hypertension)  Breast CA  No significant past surgical history    FAMILY HISTORY:  No pertinent family history in first degree relatives    MEDICATIONS  (STANDING):  cilostazol 100 milliGRAM(s) Oral two times a day  dextrose 5%. 1000 milliLiter(s) (50 mL/Hr) IV Continuous <Continuous>  dextrose 50% Injectable 12.5 Gram(s) IV Push once  gabapentin 200 milliGRAM(s) Oral three times a day  heparin  Infusion 1600 Unit(s)/Hr (16 mL/Hr) IV Continuous <Continuous>  insulin lispro (HumaLOG) corrective regimen sliding scale   SubCutaneous three times a day before meals  lidocaine   Patch 1 Patch Transdermal daily  lisinopril 10 milliGRAM(s) Oral daily  sodium chloride 0.9% with potassium chloride 20 mEq/L 1000 milliLiter(s) (75 mL/Hr) IV Continuous <Continuous>    MEDICATIONS  (PRN):  acetaminophen   Tablet .. 975 milliGRAM(s) Oral every 6 hours PRN Mild Pain (1 - 3)  acetaminophen   Tablet .. 650 milliGRAM(s) Oral every 6 hours PRN Temp greater or equal to 38C (100.4F)  dextrose 40% Gel 15 Gram(s) Oral once PRN Blood Glucose LESS THAN 70 milliGRAM(s)/deciliter  glucagon  Injectable 1 milliGRAM(s) IntraMuscular once PRN Glucose LESS THAN 70 milligrams/deciliter  oxyCODONE    IR 10 milliGRAM(s) Oral every 6 hours PRN moderate and severe pain      Vital Signs Last 24 Hrs  T(C): 36.9 (26 Nov 2018 05:29), Max: 37.1 (25 Nov 2018 17:47)  T(F): 98.4 (26 Nov 2018 05:29), Max: 98.8 (25 Nov 2018 17:47)  HR: 98 (26 Nov 2018 05:29) (96 - 98)  BP: 130/72 (26 Nov 2018 05:29) (127/70 - 144/88)  BP(mean): --  RR: 18 (26 Nov 2018 05:29) (18 - 18)  SpO2: 100% (26 Nov 2018 05:29) (95% - 100%)  nc/at  s1s2  cta  soft, nt, nd no guarding or rebound  no c/c/e    CBC Full  -  ( 26 Nov 2018 05:47 )  WBC Count : 14.28 K/uL  Hemoglobin : 8.3 g/dL  Hematocrit : 27.4 %  Platelet Count - Automated : 559 K/uL  Mean Cell Volume : 77.6 fL  Mean Cell Hemoglobin : 23.5 pg  Mean Cell Hemoglobin Concentration : 30.3 %  Auto Neutrophil # : x  Auto Lymphocyte # : x  Auto Monocyte # : x  Auto Eosinophil # : x  Auto Basophil # : x  Auto Neutrophil % : x  Auto Lymphocyte % : x  Auto Monocyte % : x  Auto Eosinophil % : x  Auto Basophil % : x    11-26    136  |  101  |  7   ----------------------------<  104<H>  4.3   |  24  |  0.78    Ca    9.7      26 Nov 2018 04:00    TPro  6.3  /  Alb  x   /  TBili  x   /  DBili  x   /  AST  x   /  ALT  x   /  AlkPhos  x   11-26    PTT - ( 26 Nov 2018 05:47 )  PTT:114.3 SEC

## 2018-11-26 NOTE — PROGRESS NOTE ADULT - ASSESSMENT
67 y/o F w/ LF ischemia to the midtarsal joint  - Pt seen and examined  - LF dry gangrene to all toes with progression of ischemia proximally, demarcating near the midtarsal joint dorsally and plantarly  - LF is cold to touch and edematous  - No open wounds, no clinical signs of infection  - Foot does not appear salvageable at this time as the ischemia has progressed proximally  - Nothing for podiatry to do at this time, awaiting vascular final plan  - D/w attending 67 y/o F w/ LF ischemia to the midtarsal joint  - Pt seen and examined  - LF dry gangrene to all toes with progression of ischemia proximally, demarcating near the midtarsal joint dorsally and plantarly  - LF is cold to touch and edematous  - No open wounds, no clinical signs of infection  - Foot does not appear salvageable at this time as the ischemia has progressed proximally  - Nothing for podiatry to do at this time, awaiting vascular final plan likely proximal amputation, please reconsult as needed  - D/w attending

## 2018-11-26 NOTE — PROGRESS NOTE ADULT - SUBJECTIVE AND OBJECTIVE BOX
Chief complaint: L foot ischemia  Interval hx: L foot appears to be demarcating     PAST MEDICAL & SURGICAL HISTORY:  DM (diabetes mellitus)  HTN (hypertension)  Breast CA  No significant past surgical history          REVIEW OF SYSTEMS:  CONSTITUTIONAL: No change in symptoms  EYES: No eye pain, visual disturbances, or discharge  NECK: No pain or stiffness  RESPIRATORY: No cough, wheezing, chills or hemoptysis; No Shortness of Breath  CARDIOVASCULAR: No chest pain, palpitations, passing out, dizziness, or leg swelling  GASTROINTESTINAL: No abdominal or epigastric pain. No nausea, vomiting, or hematemesis; No diarrhea or constipation. No melena or hematochezia.  GENITOURINARY: No dysuria, frequency, hematuria, or incontinence  NEUROLOGICAL: No headaches,   l foot pain       Medications:  MEDICATIONS  (STANDING):  cilostazol 100 milliGRAM(s) Oral two times a day  dextrose 5%. 1000 milliLiter(s) (50 mL/Hr) IV Continuous <Continuous>  dextrose 50% Injectable 12.5 Gram(s) IV Push once  gabapentin 200 milliGRAM(s) Oral three times a day  heparin  Infusion 1600 Unit(s)/Hr (16 mL/Hr) IV Continuous <Continuous>  insulin lispro (HumaLOG) corrective regimen sliding scale   SubCutaneous three times a day before meals  lidocaine   Patch 1 Patch Transdermal daily  lisinopril 10 milliGRAM(s) Oral daily  sodium chloride 0.9% with potassium chloride 20 mEq/L 1000 milliLiter(s) (75 mL/Hr) IV Continuous <Continuous>    MEDICATIONS  (PRN):  acetaminophen   Tablet .. 975 milliGRAM(s) Oral every 6 hours PRN Mild Pain (1 - 3)  acetaminophen   Tablet .. 650 milliGRAM(s) Oral every 6 hours PRN Temp greater or equal to 38C (100.4F)  dextrose 40% Gel 15 Gram(s) Oral once PRN Blood Glucose LESS THAN 70 milliGRAM(s)/deciliter  glucagon  Injectable 1 milliGRAM(s) IntraMuscular once PRN Glucose LESS THAN 70 milligrams/deciliter  oxyCODONE    IR 10 milliGRAM(s) Oral every 6 hours PRN moderate and severe pain    	    PHYSICAL EXAM:  T(C): 36.9 (11-26-18 @ 05:29), Max: 37.1 (11-25-18 @ 17:47)  HR: 98 (11-26-18 @ 05:29) (96 - 98)  BP: 130/72 (11-26-18 @ 05:29) (127/70 - 144/88)  RR: 18 (11-26-18 @ 05:29) (18 - 18)  SpO2: 100% (11-26-18 @ 05:29) (95% - 100%)  Wt(kg): --  I&O's Summary    Appearance: Normal	  HEENT:   Normal oral mucosa, PERRL, EOMI	  Lymphatic: No lymphadenopathy  Cardiovascular: Normal S1 S2, No JVD, No murmurs, No edema  Respiratory: Lungs clear to auscultation	  Psychiatry: A & O x 3, Mood & affect appropriate  Gastrointestinal:  Soft, Non-tender, + BS	  Skin: No rashes, No ecchymoses, No cyanosis	  Neurologic: Non-focal  Extremities:left foot vasc / ischemic changes     LABS:	 	    CARDIAC MARKERS:                                8.3    14.28 )-----------( 559      ( 26 Nov 2018 05:47 )             27.4     11-26    136  |  101  |  7   ----------------------------<  104<H>  4.3   |  24  |  0.78    Ca    9.7      26 Nov 2018 04:00    TPro  6.3  /  Alb  x   /  TBili  x   /  DBili  x   /  AST  x   /  ALT  x   /  AlkPhos  x   11-26    proBNP:   Lipid Profile:   HgA1c:   TSH:

## 2018-11-26 NOTE — PROGRESS NOTE ADULT - SUBJECTIVE AND OBJECTIVE BOX
Patient is a 68y old  Female who presents with a chief complaint of Left foot cellulitis (25 Nov 2018 23:03)       INTERVAL HPI/OVERNIGHT EVENTS:  Patient seen and evaluated at bedside.  Pt is resting comfortable in NAD. Denies N/V/F/C.     Allergies    No Known Allergies    Intolerances        Vital Signs Last 24 Hrs  T(C): 36.9 (26 Nov 2018 05:29), Max: 37.1 (25 Nov 2018 17:47)  T(F): 98.4 (26 Nov 2018 05:29), Max: 98.8 (25 Nov 2018 17:47)  HR: 98 (26 Nov 2018 05:29) (96 - 98)  BP: 130/72 (26 Nov 2018 05:29) (127/70 - 144/88)  BP(mean): --  RR: 18 (26 Nov 2018 05:29) (18 - 18)  SpO2: 100% (26 Nov 2018 05:29) (95% - 100%)    LABS:                        8.3    14.28 )-----------( 559      ( 26 Nov 2018 05:47 )             27.4     11-26    136  |  101  |  7   ----------------------------<  104<H>  4.3   |  24  |  0.78    Ca    9.7      26 Nov 2018 04:00    TPro  6.3  /  Alb  x   /  TBili  x   /  DBili  x   /  AST  x   /  ALT  x   /  AlkPhos  x   11-26    PTT - ( 26 Nov 2018 05:47 )  PTT:114.3 SEC    CAPILLARY BLOOD GLUCOSE      POCT Blood Glucose.: 119 mg/dL (25 Nov 2018 22:13)  POCT Blood Glucose.: 115 mg/dL (25 Nov 2018 17:18)  POCT Blood Glucose.: 122 mg/dL (25 Nov 2018 11:54)  POCT Blood Glucose.: 129 mg/dL (25 Nov 2018 08:44)      Lower Extremity Physical Exam:  Vascular: DP/PT 0/4 B/L, no CFT 2/2 dry gangrene of digits 1-5 on LF, Temperature gradient warm to cold on L foot  Neuro: Epicritic sensation intact to the level of digits B/L  Musculoskeletal/Ortho: no gross deformities  Skin: L foot dry gangrene to toes 1-5 w/ ischemia demarcated at the midtarsal joint, foot is cool to touch and edematous, very tender to touch. No open lesions, no malodor, no clinical signs of infection.

## 2018-11-26 NOTE — PROGRESS NOTE ADULT - ASSESSMENT
68F with DM, HTN, weight loss, admitted on 11/14/18 with left foot pain of several months duration  Left foot pain/ fever/ leukocytosis  Treated empirically for cellulitis with 7 day course of abx through 7/21  Seen by rheumatology.  Pathology with "endarteritis obliterans". Rheumatology DDx is  Buergers thromboangiitis obliterans, aPS or livedoid vasculopathy . This patient has been smoking since age 19. She states she has quit smoking since just prior to current admission.    Ischemic foot with dry gangrene - demarcating - may require BKA -  Continue to observe off abx.    discussed with primary team

## 2018-11-27 LAB
APTT BLD: 68.9 SEC — HIGH (ref 27.5–36.3)
BUN SERPL-MCNC: 7 MG/DL — SIGNIFICANT CHANGE UP (ref 7–23)
CALCIUM SERPL-MCNC: 9.9 MG/DL — SIGNIFICANT CHANGE UP (ref 8.4–10.5)
CANCER AG19-9 SERPL-ACNC: < 1 U/ML — SIGNIFICANT CHANGE UP
CARDIOLIPIN IGM SER-MCNC: 0.73 MPL — SIGNIFICANT CHANGE UP (ref 0–11)
CARDIOLIPIN IGM SER-MCNC: 0.73 MPL — SIGNIFICANT CHANGE UP (ref 0–11)
CARDIOLIPIN IGM SER-MCNC: 7.97 GPL — SIGNIFICANT CHANGE UP (ref 0–23)
CARDIOLIPIN IGM SER-MCNC: 7.97 GPL — SIGNIFICANT CHANGE UP (ref 0–23)
CHLORIDE SERPL-SCNC: 101 MMOL/L — SIGNIFICANT CHANGE UP (ref 98–107)
CO2 SERPL-SCNC: 24 MMOL/L — SIGNIFICANT CHANGE UP (ref 22–31)
CREAT SERPL-MCNC: 0.83 MG/DL — SIGNIFICANT CHANGE UP (ref 0.5–1.3)
GAS PNL BLDMV: SIGNIFICANT CHANGE UP
GLUCOSE BLDC GLUCOMTR-MCNC: 110 MG/DL — HIGH (ref 70–99)
GLUCOSE BLDC GLUCOMTR-MCNC: 112 MG/DL — HIGH (ref 70–99)
GLUCOSE BLDC GLUCOMTR-MCNC: 124 MG/DL — HIGH (ref 70–99)
GLUCOSE BLDC GLUCOMTR-MCNC: 129 MG/DL — HIGH (ref 70–99)
GLUCOSE SERPL-MCNC: 96 MG/DL — SIGNIFICANT CHANGE UP (ref 70–99)
HCT VFR BLD CALC: 29.3 % — LOW (ref 34.5–45)
HGB BLD-MCNC: 9 G/DL — LOW (ref 11.5–15.5)
MCHC RBC-ENTMCNC: 24.3 PG — LOW (ref 27–34)
MCHC RBC-ENTMCNC: 30.7 % — LOW (ref 32–36)
MCV RBC AUTO: 79.2 FL — LOW (ref 80–100)
NRBC # FLD: 0 — SIGNIFICANT CHANGE UP
PLATELET # BLD AUTO: 588 K/UL — HIGH (ref 150–400)
PMV BLD: 9.4 FL — SIGNIFICANT CHANGE UP (ref 7–13)
POTASSIUM SERPL-MCNC: 4.3 MMOL/L — SIGNIFICANT CHANGE UP (ref 3.5–5.3)
POTASSIUM SERPL-SCNC: 4.3 MMOL/L — SIGNIFICANT CHANGE UP (ref 3.5–5.3)
RBC # BLD: 3.7 M/UL — LOW (ref 3.8–5.2)
RBC # FLD: 17.1 % — HIGH (ref 10.3–14.5)
SODIUM SERPL-SCNC: 137 MMOL/L — SIGNIFICANT CHANGE UP (ref 135–145)
WBC # BLD: 12.51 K/UL — HIGH (ref 3.8–10.5)
WBC # FLD AUTO: 12.51 K/UL — HIGH (ref 3.8–10.5)

## 2018-11-27 PROCEDURE — 99232 SBSQ HOSP IP/OBS MODERATE 35: CPT

## 2018-11-27 PROCEDURE — 99232 SBSQ HOSP IP/OBS MODERATE 35: CPT | Mod: GC

## 2018-11-27 RX ADMIN — LISINOPRIL 10 MILLIGRAM(S): 2.5 TABLET ORAL at 05:27

## 2018-11-27 RX ADMIN — HEPARIN SODIUM 16 UNIT(S)/HR: 5000 INJECTION INTRAVENOUS; SUBCUTANEOUS at 07:04

## 2018-11-27 RX ADMIN — GABAPENTIN 200 MILLIGRAM(S): 400 CAPSULE ORAL at 05:27

## 2018-11-27 RX ADMIN — GABAPENTIN 200 MILLIGRAM(S): 400 CAPSULE ORAL at 13:34

## 2018-11-27 RX ADMIN — GABAPENTIN 200 MILLIGRAM(S): 400 CAPSULE ORAL at 21:43

## 2018-11-27 RX ADMIN — CILOSTAZOL 100 MILLIGRAM(S): 100 TABLET ORAL at 18:23

## 2018-11-27 RX ADMIN — CILOSTAZOL 100 MILLIGRAM(S): 100 TABLET ORAL at 05:27

## 2018-11-27 NOTE — PROGRESS NOTE ADULT - ASSESSMENT
67 yo F w pmhx of T2DM, HTN presenting with 1 month of LLE swelling and discoloration admitted for  presumed cellulitis       seen by id and abs stopped  however NM bone scan ? osteo vs gangrene  monitor off abx per ID      ·  Problem: HTN (hypertension).  Recommendation: BP well controlled  c/w home med of lisinopril.       ·  Problem: DM (diabetes mellitus).  Recommendation: Blood glucose well controlled  C/w sliding scale insulin.       ·  Problem: Microcytic anemia.  Recommendation: likely KRIS, outpt f/u    ·  Problem: Ischemia of LLE - c/w Heparin gtt for now, await results of vasculitis/APLS w/u  Rheum and Heme appreciated, await vasculitis w/u results  Derm appreciated, no role of repeat skin bx at this time  MRA LLE  done results noted    2nd oppinion Rheum and Heme noted, w/u APLS still pending  Pletal started per Rheum  will discuss role of steroids w/Rheum    Adrenal incidenteloma - MRI reviewed, appears to be adrenal adenoma, Surg f/u    discussed case w/ daughter at length  yesterday  likely plan for amputation of toes or foot vs BKA LLE after complete demarkation    ipmn - GI appreciated, f/u CT outpt in 1 year

## 2018-11-27 NOTE — PROGRESS NOTE ADULT - ASSESSMENT
67 yo woman with a hx of DM, HTN and breast CA s/p lumpectomy presented with left foot pain, swelling and redness. CTA obtained which showed patent vessels to ankles. Currently on medicine service undergoing work up for hypercoaguability/vasculitis    - Skin biopsy suggestive of endarteritis obliterans - however pt with strongly palpable PT and no evidence of "corkscrew" vessels which are typical characteristics strongly associated with Beurger's disease.   - Consider steroids  - Continue Rheum, Heme/onc work up  - Patient will most likely require LLE BKA, however will allow for medical management and full demarcation before planning any definitive surgical intervention  - Discussed with attending Dr. Torey Torres PGY2  Vascular surgery  [id number]

## 2018-11-27 NOTE — CHART NOTE - NSCHARTNOTEFT_GEN_A_CORE
NUTRITION SERVICES     Upon Nutritional Assessment by the Registered Dietitian your patient was determined to meet criteria/ has evidence of the following diagnosis/diagnoses:  [ ] Mild Protein Calorie Malnutrition   [ ] Moderate Protein Calorie Malnutrition   [X ] Severe Protein Calorie Malnutrition   [ ] Unspecified Protein Calorie Malnutrition   [ ] Underweight / BMI <19  [ ] Morbid Obesity / BMI >40    Findings as based on:  •  Comprehensive nutritional assessment and consultation    Please refer to Initial Dietitian Evaluation via documents section of [facility] for further recommendations.    Shanique Allen RD,CD/N,CDE

## 2018-11-27 NOTE — PROGRESS NOTE ADULT - SUBJECTIVE AND OBJECTIVE BOX
VASCULAR SURGERY PROGRESS NOTE      Subjective:  No acute events overnight        Objective:    PE:  GEN: NAD, resting quietly  RESP: symmetric chest rise bilaterally, no increased WOB  CV: tachycardic, irregular; RLE dopplerable DP and PT signals; LLE Palpable PT  ABD: soft, NTND  EXTR: LLE cool compared to right, no ulcers or wounds; left foot cool distal from midfoot, TTP      Vital Signs Last 24 Hrs  T(C): 37.1 (27 Nov 2018 05:25), Max: 37.3 (26 Nov 2018 17:42)  T(F): 98.7 (27 Nov 2018 05:25), Max: 99.1 (26 Nov 2018 17:42)  HR: 110 (27 Nov 2018 05:25) (96 - 110)  BP: 137/80 (27 Nov 2018 05:25) (134/86 - 138/81)  BP(mean): --  RR: 18 (27 Nov 2018 05:25) (18 - 18)  SpO2: 95% (27 Nov 2018 05:25) (95% - 100%)    I&O's Detail      Daily     Daily     MEDICATIONS  (STANDING):  cilostazol 100 milliGRAM(s) Oral two times a day  dextrose 5%. 1000 milliLiter(s) (50 mL/Hr) IV Continuous <Continuous>  dextrose 50% Injectable 12.5 Gram(s) IV Push once  gabapentin 200 milliGRAM(s) Oral three times a day  heparin  Infusion 1600 Unit(s)/Hr (16 mL/Hr) IV Continuous <Continuous>  insulin lispro (HumaLOG) corrective regimen sliding scale   SubCutaneous three times a day before meals  lidocaine   Patch 1 Patch Transdermal daily  lisinopril 10 milliGRAM(s) Oral daily  sodium chloride 0.9% with potassium chloride 20 mEq/L 1000 milliLiter(s) (75 mL/Hr) IV Continuous <Continuous>    MEDICATIONS  (PRN):  acetaminophen   Tablet .. 975 milliGRAM(s) Oral every 6 hours PRN Mild Pain (1 - 3)  acetaminophen   Tablet .. 650 milliGRAM(s) Oral every 6 hours PRN Temp greater or equal to 38C (100.4F)  dextrose 40% Gel 15 Gram(s) Oral once PRN Blood Glucose LESS THAN 70 milliGRAM(s)/deciliter  glucagon  Injectable 1 milliGRAM(s) IntraMuscular once PRN Glucose LESS THAN 70 milligrams/deciliter  oxyCODONE    IR 10 milliGRAM(s) Oral every 6 hours PRN moderate and severe pain      LABS:                        8.4    14.56 )-----------( 568      ( 26 Nov 2018 14:50 )             27.3     11-27    137  |  101  |  7   ----------------------------<  96  4.3   |  24  |  0.83    Ca    9.9      27 Nov 2018 05:35    TPro  6.3  /  Alb  x   /  TBili  x   /  DBili  x   /  AST  x   /  ALT  x   /  AlkPhos  x   11-26    PTT - ( 27 Nov 2018 05:35 )  PTT:68.9 SEC      RADIOLOGY & ADDITIONAL STUDIES:

## 2018-11-27 NOTE — PROGRESS NOTE ADULT - ATTENDING COMMENTS
Seen ex'ed dw'ed res,  agree w mosty above  Pain better contrilled  L foot isch unchanged from Sunday.  Dry pre gang/gang of toes and foot.  +PT pulse  ? salvageability of foot/ankle  No good options for vasc intervention  Cont multispec mgmt of atypical art insuff.  Foot care/protection  May end up w BKA

## 2018-11-27 NOTE — PROGRESS NOTE ADULT - SUBJECTIVE AND OBJECTIVE BOX
Pt is seen and examined  pt is awake and lying in bed   Offers no new complaints  Remains of Heparin gtt  ? plan for amputation      PAST MEDICAL & SURGICAL HISTORY:  DM (diabetes mellitus)  HTN (hypertension)  Breast CA  No significant past surgical history      ROS:  Negative except for:    MEDICATIONS  (STANDING):  cilostazol 100 milliGRAM(s) Oral two times a day  dextrose 5%. 1000 milliLiter(s) (50 mL/Hr) IV Continuous <Continuous>  dextrose 50% Injectable 12.5 Gram(s) IV Push once  gabapentin 200 milliGRAM(s) Oral three times a day  heparin  Infusion 1600 Unit(s)/Hr (16 mL/Hr) IV Continuous <Continuous>  insulin lispro (HumaLOG) corrective regimen sliding scale   SubCutaneous three times a day before meals  lidocaine   Patch 1 Patch Transdermal daily  lisinopril 10 milliGRAM(s) Oral daily  sodium chloride 0.9% with potassium chloride 20 mEq/L 1000 milliLiter(s) (75 mL/Hr) IV Continuous <Continuous>    MEDICATIONS  (PRN):  acetaminophen   Tablet .. 975 milliGRAM(s) Oral every 6 hours PRN Mild Pain (1 - 3)  acetaminophen   Tablet .. 650 milliGRAM(s) Oral every 6 hours PRN Temp greater or equal to 38C (100.4F)  dextrose 40% Gel 15 Gram(s) Oral once PRN Blood Glucose LESS THAN 70 milliGRAM(s)/deciliter  glucagon  Injectable 1 milliGRAM(s) IntraMuscular once PRN Glucose LESS THAN 70 milligrams/deciliter  oxyCODONE    IR 10 milliGRAM(s) Oral every 6 hours PRN moderate and severe pain      Allergies    No Known Allergies    Intolerances        Vital Signs Last 24 Hrs  T(C): 37.1 (27 Nov 2018 05:25), Max: 37.3 (26 Nov 2018 17:42)  T(F): 98.7 (27 Nov 2018 05:25), Max: 99.1 (26 Nov 2018 17:42)  HR: 110 (27 Nov 2018 05:25) (96 - 110)  BP: 137/80 (27 Nov 2018 05:25) (134/86 - 138/81)  BP(mean): --  RR: 18 (27 Nov 2018 05:25) (18 - 18)  SpO2: 95% (27 Nov 2018 05:25) (95% - 100%)    PHYSICAL EXAM    NAD  Awake, alert  Anicteric, MMM  RRR S1,S2+  CTAB No ronchi  Abd soft, NT, ND, + BS  RLE without abnml. LLE foot cyanotic and cool up to about ankle, worst at toes, tender to touch      LABS:                          8.4    14.56 )-----------( 568      ( 26 Nov 2018 14:50 )             27.3     Serial CBC's  11-26 @ 14:50  Hct-27.3 / Hgb-8.4 / Plat-568 / RBC-3.53 / WBC-14.56          Serial CBC's  11-26 @ 05:47  Hct-27.4 / Hgb-8.3 / Plat-559 / RBC-3.53 / WBC-14.28            11-27    137  |  101  |  7   ----------------------------<  96  4.3   |  24  |  0.83    Ca    9.9      27 Nov 2018 05:35    TPro  6.3  /  Alb  x   /  TBili  x   /  DBili  x   /  AST  x   /  ALT  x   /  AlkPhos  x   11-26      PTT - ( 27 Nov 2018 05:35 )  PTT:68.9 SEC    WBC Count: 14.56 K/uL (11-26 @ 14:50)  Hemoglobin: 8.4 g/dL (11-26 @ 14:50)            RADIOLOGY & ADDITIONAL STUDIES:

## 2018-11-27 NOTE — DIETITIAN INITIAL EVALUATION ADULT. - OTHER INFO
Initial Dietitian Evaluation 2/2 to extended length of stay. Pt admitted with Dx Cellulitis of left lower extremity. Denies chewing, swallowing difficulties or any nausea, vomiting, diarrhea, constipation. Pt is completing <75% of most meals. Encourage adequate po intakes, small meal intakes as tolerated along with CSTCHO diet.

## 2018-11-27 NOTE — PROGRESS NOTE ADULT - SUBJECTIVE AND OBJECTIVE BOX
RENATA DIXON:[id number],   68yFemale followed for:  No Known Allergies    PAST MEDICAL & SURGICAL HISTORY:  DM (diabetes mellitus)  HTN (hypertension)  Breast CA  No significant past surgical history    FAMILY HISTORY:  No pertinent family history in first degree relatives    MEDICATIONS  (STANDING):  cilostazol 100 milliGRAM(s) Oral two times a day  dextrose 5%. 1000 milliLiter(s) (50 mL/Hr) IV Continuous <Continuous>  dextrose 50% Injectable 12.5 Gram(s) IV Push once  gabapentin 200 milliGRAM(s) Oral three times a day  heparin  Infusion 1600 Unit(s)/Hr (16 mL/Hr) IV Continuous <Continuous>  insulin lispro (HumaLOG) corrective regimen sliding scale   SubCutaneous three times a day before meals  lidocaine   Patch 1 Patch Transdermal daily  lisinopril 10 milliGRAM(s) Oral daily  sodium chloride 0.9% with potassium chloride 20 mEq/L 1000 milliLiter(s) (75 mL/Hr) IV Continuous <Continuous>    MEDICATIONS  (PRN):  acetaminophen   Tablet .. 975 milliGRAM(s) Oral every 6 hours PRN Mild Pain (1 - 3)  acetaminophen   Tablet .. 650 milliGRAM(s) Oral every 6 hours PRN Temp greater or equal to 38C (100.4F)  dextrose 40% Gel 15 Gram(s) Oral once PRN Blood Glucose LESS THAN 70 milliGRAM(s)/deciliter  glucagon  Injectable 1 milliGRAM(s) IntraMuscular once PRN Glucose LESS THAN 70 milligrams/deciliter  oxyCODONE    IR 10 milliGRAM(s) Oral every 6 hours PRN moderate and severe pain      Vital Signs Last 24 Hrs  T(C): 37.1 (27 Nov 2018 05:25), Max: 37.3 (26 Nov 2018 17:42)  T(F): 98.7 (27 Nov 2018 05:25), Max: 99.1 (26 Nov 2018 17:42)  HR: 110 (27 Nov 2018 05:25) (96 - 110)  BP: 137/80 (27 Nov 2018 05:25) (134/86 - 138/81)  BP(mean): --  RR: 18 (27 Nov 2018 05:25) (18 - 18)  SpO2: 95% (27 Nov 2018 05:25) (95% - 100%)  nc/at  s1s2  cta  soft, nt, nd no guarding or rebound  no c/c/e    CBC Full  -  ( 26 Nov 2018 14:50 )  WBC Count : 14.56 K/uL  Hemoglobin : 8.4 g/dL  Hematocrit : 27.3 %  Platelet Count - Automated : 568 K/uL  Mean Cell Volume : 77.3 fL  Mean Cell Hemoglobin : 23.8 pg  Mean Cell Hemoglobin Concentration : 30.8 %  Auto Neutrophil # : x  Auto Lymphocyte # : x  Auto Monocyte # : x  Auto Eosinophil # : x  Auto Basophil # : x  Auto Neutrophil % : x  Auto Lymphocyte % : x  Auto Monocyte % : x  Auto Eosinophil % : x  Auto Basophil % : x    11-27    137  |  101  |  7   ----------------------------<  96  4.3   |  24  |  0.83    Ca    9.9      27 Nov 2018 05:35    TPro  6.3  /  Alb  x   /  TBili  x   /  DBili  x   /  AST  x   /  ALT  x   /  AlkPhos  x   11-26    PTT - ( 27 Nov 2018 05:35 )  PTT:68.9 SEC

## 2018-11-27 NOTE — PROGRESS NOTE ADULT - SUBJECTIVE AND OBJECTIVE BOX
Chief complaint: L foot ischemia  Interval hx: no acute events    PAST MEDICAL & SURGICAL HISTORY:  DM (diabetes mellitus)  HTN (hypertension)  Breast CA  No significant past surgical history          REVIEW OF SYSTEMS:  CONSTITUTIONAL: No change in symptoms  EYES: No eye pain, visual disturbances, or discharge  NECK: No pain or stiffness  RESPIRATORY: No cough, wheezing, chills or hemoptysis; No Shortness of Breath  CARDIOVASCULAR: No chest pain, palpitations, passing out, dizziness, or leg swelling  GASTROINTESTINAL: No abdominal or epigastric pain. No nausea, vomiting, or hematemesis; No diarrhea or constipation. No melena or hematochezia.  GENITOURINARY: No dysuria, frequency, hematuria, or incontinence  NEUROLOGICAL: No headaches,   l foot pain       Medications:  MEDICATIONS  (STANDING):  cilostazol 100 milliGRAM(s) Oral two times a day  dextrose 5%. 1000 milliLiter(s) (50 mL/Hr) IV Continuous <Continuous>  dextrose 50% Injectable 12.5 Gram(s) IV Push once  gabapentin 200 milliGRAM(s) Oral three times a day  heparin  Infusion 1600 Unit(s)/Hr (16 mL/Hr) IV Continuous <Continuous>  insulin lispro (HumaLOG) corrective regimen sliding scale   SubCutaneous three times a day before meals  lidocaine   Patch 1 Patch Transdermal daily  lisinopril 10 milliGRAM(s) Oral daily  sodium chloride 0.9% with potassium chloride 20 mEq/L 1000 milliLiter(s) (75 mL/Hr) IV Continuous <Continuous>    MEDICATIONS  (PRN):  acetaminophen   Tablet .. 975 milliGRAM(s) Oral every 6 hours PRN Mild Pain (1 - 3)  acetaminophen   Tablet .. 650 milliGRAM(s) Oral every 6 hours PRN Temp greater or equal to 38C (100.4F)  dextrose 40% Gel 15 Gram(s) Oral once PRN Blood Glucose LESS THAN 70 milliGRAM(s)/deciliter  glucagon  Injectable 1 milliGRAM(s) IntraMuscular once PRN Glucose LESS THAN 70 milligrams/deciliter  oxyCODONE    IR 10 milliGRAM(s) Oral every 6 hours PRN moderate and severe pain    	    PHYSICAL EXAM:  T(C): 37.1 (11-27-18 @ 05:25), Max: 37.3 (11-26-18 @ 17:42)  HR: 110 (11-27-18 @ 05:25) (96 - 110)  BP: 137/80 (11-27-18 @ 05:25) (134/86 - 138/81)  RR: 18 (11-27-18 @ 05:25) (18 - 18)  SpO2: 95% (11-27-18 @ 05:25) (95% - 100%)  Wt(kg): --  I&O's Summary      Appearance: Normal	  HEENT:   Normal oral mucosa, PERRL, EOMI	  Lymphatic: No lymphadenopathy  Cardiovascular: Normal S1 S2, No JVD, No murmurs, No edema  Respiratory: Lungs clear to auscultation	  Psychiatry: A & O x 3, Mood & affect appropriate  Gastrointestinal:  Soft, Non-tender, + BS	  Skin: No rashes, No ecchymoses, No cyanosis	  Neurologic: Non-focal  Extremities:left foot vasc / ischemic changes     LABS:	 	    CARDIAC MARKERS:                                9.0    12.51 )-----------( 588      ( 27 Nov 2018 05:35 )             29.3     11-27    137  |  101  |  7   ----------------------------<  96  4.3   |  24  |  0.83    Ca    9.9      27 Nov 2018 05:35    TPro  6.3  /  Alb  x   /  TBili  x   /  DBili  x   /  AST  x   /  ALT  x   /  AlkPhos  x   11-26    proBNP:   Lipid Profile:   HgA1c:   TSH:

## 2018-11-27 NOTE — PROGRESS NOTE ADULT - ASSESSMENT
1. 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 Low suspicion for APLS.   -- cont Heaprin gtt for now  -- f/u ACL and [id number] Antibodies  -- f/u vascular, ID, and rheum  -- ? amputation    2. anemia     -- MCV low, ferritin elevated to 500s but may be seen in acute setting. HbEP normal  -- may still have RKIS vs alpha thal  -- would need to repeat Fe panel and check alpha thal as outpt  -- adequate, monitor  -- No B12/Folate Def  -- No Hemolysis  -- f/u SPEP/TANNER    3. thrombocytosis     -- likely reactive, monitor for now  -- No indication for cytoreduction    4. possible side branch IPMN     -- GI Input appreciated  -- f/u CA 19-9  -- needs outpt follow up    Olga Gerardo MD  [phone]

## 2018-11-27 NOTE — PROGRESS NOTE ADULT - SUBJECTIVE AND OBJECTIVE BOX
INTERVAL HPI/OVERNIGHT EVENTS:  Remains with R foot ischemiaa; no change since starting cilostazol.  No other symptoms    MEDICATIONS  (STANDING):  cilostazol 100 milliGRAM(s) Oral two times a day  dextrose 5%. 1000 milliLiter(s) (50 mL/Hr) IV Continuous <Continuous>  dextrose 50% Injectable 12.5 Gram(s) IV Push once  gabapentin 200 milliGRAM(s) Oral three times a day  heparin  Infusion 1600 Unit(s)/Hr (16 mL/Hr) IV Continuous <Continuous>  insulin lispro (HumaLOG) corrective regimen sliding scale   SubCutaneous three times a day before meals  lidocaine   Patch 1 Patch Transdermal daily  lisinopril 10 milliGRAM(s) Oral daily    MEDICATIONS  (PRN):  acetaminophen   Tablet .. 975 milliGRAM(s) Oral every 6 hours PRN Mild Pain (1 - 3)  acetaminophen   Tablet .. 650 milliGRAM(s) Oral every 6 hours PRN Temp greater or equal to 38C (100.4F)  dextrose 40% Gel 15 Gram(s) Oral once PRN Blood Glucose LESS THAN 70 milliGRAM(s)/deciliter  glucagon  Injectable 1 milliGRAM(s) IntraMuscular once PRN Glucose LESS THAN 70 milligrams/deciliter  oxyCODONE    IR 10 milliGRAM(s) Oral every 6 hours PRN moderate and severe pain      Allergies    No Known Allergies    Intolerances        REVIEW OF SYSTEMS    No additional symptoms    Vital Signs Last 24 Hrs  T(C): 37.4 (27 Nov 2018 13:10), Max: 37.4 (27 Nov 2018 13:10)  T(F): 99.3 (27 Nov 2018 13:10), Max: 99.3 (27 Nov 2018 13:10)  HR: 113 (27 Nov 2018 13:10) (100 - 113)  BP: 144/80 (27 Nov 2018 13:10) (134/86 - 144/80)  BP(mean): --  RR: 18 (27 Nov 2018 13:10) (18 - 18)  SpO2: 95% (27 Nov 2018 13:10) (95% - 97%)      PHYSICAL EXAM:    Constitutional:  well appearing    Cardiovascular:  s1s2 reg    Gastrointestinal:  abd soft nt    Extremities:  L foot; +ischemia, +gangrene of the toes and area p MTPs  very tender to touch  R foot normal    Vascular:  Ischemia of L foot; DP pulse palpable    Neurological:    Skin:  No active rash    Lymph Nodes:    Musculoskeletal:  no synovitis    Psychiatric:  in good spirits    LABS:                        9.0    12.51 )-----------( 588      ( 27 Nov 2018 05:35 )             29.3     11-27    137  |  101  |  7   ----------------------------<  96  4.3   |  24  |  0.83    Ca    9.9      27 Nov 2018 05:35    TPro  6.3  /  Alb  x   /  TBili  x   /  DBili  x   /  AST  x   /  ALT  x   /  AlkPhos  x   11-26    PTT - ( 27 Nov 2018 05:35 )  PTT:68.9 SEC      RADIOLOGY & ADDITIONAL TESTS:

## 2018-11-27 NOTE — PROGRESS NOTE ADULT - ASSESSMENT
Imp:  69 yo female with ischemia of the L foot progressing to gangrene and endarteritis obliterans seen on path.  All other vessels patent on MR angio.    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

## 2018-11-27 NOTE — PROGRESS NOTE ADULT - ASSESSMENT
68F with DM, HTN, weight loss, admitted on 11/14/18 with left foot pain of several months duration  Left foot pain/ fever/ leukocytosis  Treated empirically for cellulitis with 7 day course of abx through 7/21  Seen by rheumatology.  Pathology with "endarteritis obliterans". Rheumatology DDx is  Buergers thromboangiitis obliterans, aPS or livedoid vasculopathy . This patient has been smoking since age 19. She states she has quit smoking since just prior to current admission.  Vascular follow up noted - they suggest steroids and waiting for more clear demarcation    Ischemic foot with dry gangrene     Suggest:  Rheumatology follow up  Continue to observe off abx.

## 2018-11-27 NOTE — PROGRESS NOTE ADULT - SUBJECTIVE AND OBJECTIVE BOX
Follow Up:  ischemic foot    Interval History/ROS: foot discomfort remains about the same - she is aware of probability of eventual amputation - TMA or possible BKA    Allergies  No Known Allergies    ANTIMICROBIALS:      OTHER MEDS:  MEDICATIONS  (STANDING):  acetaminophen   Tablet .. 975 every 6 hours PRN  acetaminophen   Tablet .. 650 every 6 hours PRN  cilostazol 100 two times a day  dextrose 40% Gel 15 once PRN  dextrose 50% Injectable 12.5 once  gabapentin 200 three times a day  glucagon  Injectable 1 once PRN  heparin  Infusion 1600 <Continuous>  insulin lispro (HumaLOG) corrective regimen sliding scale  three times a day before meals  lisinopril 10 daily  oxyCODONE    IR 10 every 6 hours PRN      Vital Signs Last 24 Hrs  T(C): 37.1 (27 Nov 2018 05:25), Max: 37.3 (26 Nov 2018 17:42)  T(F): 98.7 (27 Nov 2018 05:25), Max: 99.1 (26 Nov 2018 17:42)  HR: 110 (27 Nov 2018 05:25) (96 - 110)  BP: 137/80 (27 Nov 2018 05:25) (134/86 - 138/81)  BP(mean): --  RR: 18 (27 Nov 2018 05:25) (18 - 18)  SpO2: 95% (27 Nov 2018 05:25) (95% - 100%)    PHYSICAL EXAM:  General: WN/WD NAD, Non-toxic  Neurology: A&Ox3, nonfocal  Respiratory: Clear to auscultation bilaterally  CV: RRR, S1S2, no murmurs, rubs or gallops  Abdominal: Soft, Non-tender, non-distended, normal bowel sounds  Extremities: No edema, dry gangernous changes with apparent early demarcation around left mid foot  Line Sites: Clear  Skin: No rash                        9.0    12.51 )-----------( 588      ( 27 Nov 2018 05:35 )             29.3   WBC Count: 12.51 (11-27 @ 05:35)  WBC Count: 14.56 (11-26 @ 14:50)  WBC Count: 14.28 (11-26 @ 05:47)  WBC Count: 13.60 (11-25 @ 08:20)  WBC Count: 11.80 (11-25 @ 05:35)  WBC Count: 13.29 (11-24 @ 12:11)  WBC Count: 12.78 (11-24 @ 05:03)  WBC Count: 12.79 (11-23 @ 05:28)      11-27    137  |  101  |  7   ----------------------------<  96  4.3   |  24  |  0.83    Ca    9.9      27 Nov 2018 05:35    TPro  6.3  /  Alb  x   /  TBili  x   /  DBili  x   /  AST  x   /  ALT  x   /  AlkPhos  x   11-26        MICROBIOLOGY:  THROAT  11-19-18 --  --  --      BLOOD PERIPHERAL  11-18-18 --  --  --      BLOOD  11-14-18 --  --  --          RADIOLOGY:    Dayne Briseno MD; Division of Infectious Disease; Pager: [phone]; nights and weekends: [phone]

## 2018-11-28 LAB
APTT BLD: 57.6 SEC — HIGH (ref 27.5–36.3)
APTT BLD: 68.6 SEC — HIGH (ref 27.5–36.3)
B2 GLYCOPROT1 AB SER QL: NEGATIVE — SIGNIFICANT CHANGE UP
BUN SERPL-MCNC: 8 MG/DL — SIGNIFICANT CHANGE UP (ref 7–23)
CALCIUM SERPL-MCNC: 10 MG/DL — SIGNIFICANT CHANGE UP (ref 8.4–10.5)
CHLORIDE SERPL-SCNC: 101 MMOL/L — SIGNIFICANT CHANGE UP (ref 98–107)
CO2 SERPL-SCNC: 23 MMOL/L — SIGNIFICANT CHANGE UP (ref 22–31)
CREAT SERPL-MCNC: 0.83 MG/DL — SIGNIFICANT CHANGE UP (ref 0.5–1.3)
GAS PNL BLDMV: SIGNIFICANT CHANGE UP
GLUCOSE BLDC GLUCOMTR-MCNC: 114 MG/DL — HIGH (ref 70–99)
GLUCOSE BLDC GLUCOMTR-MCNC: 116 MG/DL — HIGH (ref 70–99)
GLUCOSE BLDC GLUCOMTR-MCNC: 118 MG/DL — HIGH (ref 70–99)
GLUCOSE SERPL-MCNC: 103 MG/DL — HIGH (ref 70–99)
HCT VFR BLD CALC: 28.4 % — LOW (ref 34.5–45)
HGB BLD-MCNC: 8.8 G/DL — LOW (ref 11.5–15.5)
MCHC RBC-ENTMCNC: 24.4 PG — LOW (ref 27–34)
MCHC RBC-ENTMCNC: 31 % — LOW (ref 32–36)
MCV RBC AUTO: 78.9 FL — LOW (ref 80–100)
NRBC # FLD: 0 — SIGNIFICANT CHANGE UP
PLATELET # BLD AUTO: 574 K/UL — HIGH (ref 150–400)
PMV BLD: 9.7 FL — SIGNIFICANT CHANGE UP (ref 7–13)
POTASSIUM SERPL-MCNC: 4.2 MMOL/L — SIGNIFICANT CHANGE UP (ref 3.5–5.3)
POTASSIUM SERPL-SCNC: 4.2 MMOL/L — SIGNIFICANT CHANGE UP (ref 3.5–5.3)
RBC # BLD: 3.6 M/UL — LOW (ref 3.8–5.2)
RBC # FLD: 17 % — HIGH (ref 10.3–14.5)
SODIUM SERPL-SCNC: 137 MMOL/L — SIGNIFICANT CHANGE UP (ref 135–145)
WBC # BLD: 13.14 K/UL — HIGH (ref 3.8–10.5)
WBC # FLD AUTO: 13.14 K/UL — HIGH (ref 3.8–10.5)

## 2018-11-28 PROCEDURE — 99232 SBSQ HOSP IP/OBS MODERATE 35: CPT

## 2018-11-28 RX ADMIN — Medication 125 MILLIGRAM(S): at 17:37

## 2018-11-28 RX ADMIN — HEPARIN SODIUM 16 UNIT(S)/HR: 5000 INJECTION INTRAVENOUS; SUBCUTANEOUS at 13:49

## 2018-11-28 RX ADMIN — GABAPENTIN 200 MILLIGRAM(S): 400 CAPSULE ORAL at 13:48

## 2018-11-28 RX ADMIN — CILOSTAZOL 100 MILLIGRAM(S): 100 TABLET ORAL at 05:08

## 2018-11-28 RX ADMIN — GABAPENTIN 200 MILLIGRAM(S): 400 CAPSULE ORAL at 05:08

## 2018-11-28 RX ADMIN — GABAPENTIN 200 MILLIGRAM(S): 400 CAPSULE ORAL at 21:02

## 2018-11-28 RX ADMIN — CILOSTAZOL 100 MILLIGRAM(S): 100 TABLET ORAL at 17:38

## 2018-11-28 RX ADMIN — LISINOPRIL 10 MILLIGRAM(S): 2.5 TABLET ORAL at 05:09

## 2018-11-28 RX ADMIN — HEPARIN SODIUM 16 UNIT(S)/HR: 5000 INJECTION INTRAVENOUS; SUBCUTANEOUS at 09:06

## 2018-11-28 NOTE — PROGRESS NOTE ADULT - ASSESSMENT
69 yo F w pmhx of T2DM, HTN presenting with 1 month of LLE swelling and discoloration admitted for  presumed cellulitis       seen by id and abs stopped  however NM bone scan ? osteo vs gangrene  monitor off abx per ID      ·  Problem: HTN (hypertension).  Recommendation: BP well controlled  c/w home med of lisinopril.       ·  Problem: DM (diabetes mellitus).  Recommendation: Blood glucose well controlled  C/w sliding scale insulin.       ·  Problem: Microcytic anemia.  Recommendation: likely KRIS, outpt f/u    ·  Problem: Ischemia of LLE - c/w Heparin gtt for now, await results of vasculitis/APLS w/u  Rheum and Heme appreciated, await vasculitis w/u results  Derm appreciated, no role of repeat skin bx at this time  MRA LLE  done results noted    2nd oppinion Rheum and Heme noted, w/u APLS still pending  Pletal started per Rheum  will discuss role of steroids w/Rheum    Adrenal incidenteloma - MRI reviewed, appears to be adrenal adenoma, Surg f/u    case discussed w/daughter over the phone yesterday 6PM, all questions answered  likely plan for amputation of toes or foot vs BKA LLE after complete demarkation    ipmn - GI appreciated, f/u CT outpt in 1 year

## 2018-11-28 NOTE — PROGRESS NOTE ADULT - SUBJECTIVE AND OBJECTIVE BOX
Surgery C-Team Daily Progress Note     RENATA DIXON | [id number]    SUBJECTIVE / 24H EVENTS  Patient seen and examined on morning rounds. No acute events overnight.     OBJECTIVE:  VITAL SIGNS:  T(C): 37.2 (18 @ 21:22), Max: 37.4 (18 @ 13:10)  HR: 102 (18 @ 21:22) (102 - 113)  BP: 128/80 (18 @ 21:22) (128/80 - 144/80)  RR: 18 (18 @ 21:22) (18 - 18)  SpO2: 97% (18 @ 21:22) (95% - 97%)    Daily Weight in k.1 (2018 12:57)  POCT Blood Glucose.: 129 mg/dL (18 @ 22:06)  POCT Blood Glucose.: 110 mg/dL (18 @ 17:26)  POCT Blood Glucose.: 112 mg/dL (18 @ 12:08)      PHYSICAL EXAM:  GEN: NAD, resting quietly  RESP: symmetric chest rise bilaterally, no increased WOB  CV: tachycardic, irregular; RLE dopplerable DP and PT signals; LLE Palpable PT  ABD: soft, NTND  EXTR: LLE cool compared to right, no ulcers or wounds; left foot cool distal from midfoot, TTP      LAB VALUES:      137  |  101  |  7   ----------------------------<  96  4.3   |  24  |  0.83    Ca    9.9      2018 05:35    TPro  6.3  /  Alb  x   /  TBili  x   /  DBili  x   /  AST  x   /  ALT  x   /  AlkPhos  x                                  9.0    12.51 )-----------( 588      ( 2018 05:35 )             29.3     LIVER FUNCTIONS - ( 2018 05:47 )  Alb: x     / Pro: 6.3 g/dL / ALK PHOS: x     / ALT: x     / AST: x     / GGT: x           PTT - ( 2018 05:35 )  PTT:68.9 SEC    MICROBIOLOGY:    No new microbiology data for review.     RADIOLOGY:    No new radiographic images for review.    MEDICATIONS  (STANDING):  cilostazol 100 milliGRAM(s) Oral two times a day  dextrose 5%. 1000 milliLiter(s) (50 mL/Hr) IV Continuous <Continuous>  dextrose 50% Injectable 12.5 Gram(s) IV Push once  gabapentin 200 milliGRAM(s) Oral three times a day  heparin  Infusion 1600 Unit(s)/Hr (16 mL/Hr) IV Continuous <Continuous>  insulin lispro (HumaLOG) corrective regimen sliding scale   SubCutaneous three times a day before meals  lidocaine   Patch 1 Patch Transdermal daily  lisinopril 10 milliGRAM(s) Oral daily    MEDICATIONS  (PRN):  acetaminophen   Tablet .. 975 milliGRAM(s) Oral every 6 hours PRN Mild Pain (1 - 3)  acetaminophen   Tablet .. 650 milliGRAM(s) Oral every 6 hours PRN Temp greater or equal to 38C (100.4F)  dextrose 40% Gel 15 Gram(s) Oral once PRN Blood Glucose LESS THAN 70 milliGRAM(s)/deciliter  glucagon  Injectable 1 milliGRAM(s) IntraMuscular once PRN Glucose LESS THAN 70 milligrams/deciliter  oxyCODONE    IR 10 milliGRAM(s) Oral every 6 hours PRN moderate and severe pain

## 2018-11-28 NOTE — PROGRESS NOTE ADULT - SUBJECTIVE AND OBJECTIVE BOX
Follow Up:  ischemic left foot    Interval History/ROS:  left foot discomfort about the same, no acute distress    Allergies  No Known Allergies    ANTIMICROBIALS:      OTHER MEDS:  MEDICATIONS  (STANDING):  acetaminophen   Tablet .. 975 every 6 hours PRN  acetaminophen   Tablet .. 650 every 6 hours PRN  cilostazol 100 two times a day  dextrose 40% Gel 15 once PRN  dextrose 50% Injectable 12.5 once  gabapentin 200 three times a day  glucagon  Injectable 1 once PRN  heparin  Infusion 1600 <Continuous>  insulin lispro (HumaLOG) corrective regimen sliding scale  three times a day before meals  lisinopril 10 daily  methylPREDNISolone sodium succinate Injectable 125 daily  oxyCODONE    IR 10 every 6 hours PRN      Vital Signs Last 24 Hrs  T(C): 37.8 (28 Nov 2018 13:39), Max: 37.8 (28 Nov 2018 13:39)  T(F): 100 (28 Nov 2018 13:39), Max: 100 (28 Nov 2018 13:39)  HR: 116 (28 Nov 2018 13:39) (102 - 116)  BP: 124/71 (28 Nov 2018 13:39) (124/71 - 130/69)  BP(mean): --  RR: 18 (28 Nov 2018 13:39) (18 - 18)  SpO2: 99% (28 Nov 2018 13:39) (97% - 100%)    PHYSICAL EXAM:  General: WN/WD NAD, Non-toxic  Neurology: A&Ox3, nonfocal  Respiratory: Clear to auscultation bilaterally  CV: RRR, S1S2, no murmurs, rubs or gallops  Abdominal: Soft, Non-tender, non-distended, normal bowel sounds  Extremities: dry gangrenous left toes and forefoot  Line Sites: Clear  Skin: No rash                        8.8    13.14 )-----------( 574      ( 28 Nov 2018 06:22 )             28.4       11-28    137  |  101  |  8   ----------------------------<  103<H>  4.2   |  23  |  0.83    Ca    10.0      28 Nov 2018 06:22      MICROBIOLOGY:  THROAT  11-19-18 --  --  --      BLOOD PERIPHERAL  11-18-18 --  --  --      BLOOD  11-14-18 --  --  --      RADIOLOGY:      Dayne Briseno MD; Division of Infectious Disease; Pager: [phone]; nights and weekends: [phone]

## 2018-11-28 NOTE — PROGRESS NOTE ADULT - SUBJECTIVE AND OBJECTIVE BOX
Pt seen, no new complaints.       MEDICATIONS  (STANDING):  cilostazol 100 milliGRAM(s) Oral two times a day  dextrose 5%. 1000 milliLiter(s) (50 mL/Hr) IV Continuous <Continuous>  dextrose 50% Injectable 12.5 Gram(s) IV Push once  gabapentin 200 milliGRAM(s) Oral three times a day  heparin  Infusion 1600 Unit(s)/Hr (16 mL/Hr) IV Continuous <Continuous>  insulin lispro (HumaLOG) corrective regimen sliding scale   SubCutaneous three times a day before meals  lidocaine   Patch 1 Patch Transdermal daily  lisinopril 10 milliGRAM(s) Oral daily  methylPREDNISolone sodium succinate Injectable 125 milliGRAM(s) IV Push daily    MEDICATIONS  (PRN):  acetaminophen   Tablet .. 975 milliGRAM(s) Oral every 6 hours PRN Mild Pain (1 - 3)  acetaminophen   Tablet .. 650 milliGRAM(s) Oral every 6 hours PRN Temp greater or equal to 38C (100.4F)  dextrose 40% Gel 15 Gram(s) Oral once PRN Blood Glucose LESS THAN 70 milliGRAM(s)/deciliter  glucagon  Injectable 1 milliGRAM(s) IntraMuscular once PRN Glucose LESS THAN 70 milligrams/deciliter  oxyCODONE    IR 10 milliGRAM(s) Oral every 6 hours PRN moderate and severe pain      ROS  limited 2/2 participation    Vital Signs Last 24 Hrs  T(C): 37.8 (28 Nov 2018 13:39), Max: 37.8 (28 Nov 2018 13:39)  T(F): 100 (28 Nov 2018 13:39), Max: 100 (28 Nov 2018 13:39)  HR: 116 (28 Nov 2018 13:39) (102 - 116)  BP: 124/71 (28 Nov 2018 13:39) (124/71 - 130/69)  BP(mean): --  RR: 18 (28 Nov 2018 13:39) (18 - 18)  SpO2: 99% (28 Nov 2018 13:39) (97% - 100%)    PE  NAD  Awake, alert  Anicteric, MMM  LLE more cyanotic and toes black  remainder of exam limited 2/2 participation                          8.8    13.14 )-----------( 574      ( 28 Nov 2018 06:22 )             28.4       11-28    137  |  101  |  8   ----------------------------<  103<H>  4.2   |  23  |  0.83    Ca    10.0      28 Nov 2018 06:22

## 2018-11-28 NOTE — PROGRESS NOTE ADULT - ASSESSMENT
68F with DM, HTN, weight loss, admitted on 11/14/18 with left foot pain of several months duration  Left foot pain/ fever/ leukocytosis  Treated empirically for cellulitis with 7 day course of abx through 7/21  Seen by rheumatology.  Pathology with "endarteritis obliterans". Rheumatology DDx is  Buergers thromboangiitis obliterans, aPS or livedoid vasculopathy . This patient has been smoking since age 19. She states she has quit smoking since just prior to current admission.  Vascular follow up noted - they suggest steroids and waiting for more clear demarcation    Ischemic foot with dry gangrene     Suggest:  Continue to observe off abx.

## 2018-11-28 NOTE — PROGRESS NOTE ADULT - ASSESSMENT
1. 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 Low suspicion for APLS. b2 glycoprotein neg, anticardiolipin IgG and IgM neg  -- repeat anticardiolipin panel pending  -- vessels are shown to be patent on imaging. Would d/c heparin since no evidence for APLS if ok with vascular  -- f/u vascular, ID, and rheum  -- ? amputation    2. anemia     -- MCV low, ferritin elevated to 500s but may be seen in acute setting. HbEP normal  -- may still have KRIS vs alpha thal  -- would need to repeat Fe panel and check alpha thal as outpt  -- adequate, monitor  -- No B12/Folate Def  -- No Hemolysis  -- f/u SPEP/TANNER    3. thrombocytosis     -- likely reactive, monitor for now  -- No indication for cytoreduction    4. possible side branch IPMN     -- GI Input appreciated  -- [id number] neg  -- outpt follow up    D/w NP

## 2018-11-28 NOTE — PROGRESS NOTE ADULT - ASSESSMENT
69 yo woman with a hx of DM, HTN and breast CA s/p lumpectomy presented with left foot pain, swelling and redness. CTA obtained which showed patent vessels to ankles. Currently on medicine service undergoing work up for hypercoagulability / vasculitis.    - Skin biopsy suggestive of endarteritis obliterans - however pt with strongly palpable PT and no evidence of "corkscrew" vessels which are typical characteristics strongly associated with Beurger's disease.   - Consider steroids  - Continue Rheum, Heme/onc work up  - Patient will most likely require LLE BKA, however will allow for medical management and full demarcation before planning any definitive surgical intervention    Surgery, C-Team   Pager: [phone] 67 yo woman with a hx of DM, HTN and breast CA s/p lumpectomy presented with left foot pain, swelling and redness. CTA obtained which showed patent vessels to ankles. Currently on medicine service undergoing work up for hypercoagulability / vasculitis.    - Skin biopsy suggestive of endarteritis obliterans - Continue Rheum, Heme/onc work up  - Consider steroids  - Patient will most likely require LLE BKA, however will allow for medical management and full demarcation before planning any definitive surgical intervention    Surgery, C-Team   Pager: [phone]

## 2018-11-28 NOTE — PROGRESS NOTE ADULT - SUBJECTIVE AND OBJECTIVE BOX
Chief complaint: L foot ischemia  Interval hx: no acute events    PAST MEDICAL & SURGICAL HISTORY:  DM (diabetes mellitus)  HTN (hypertension)  Breast CA  No significant past surgical history          REVIEW OF SYSTEMS:  CONSTITUTIONAL: No change in symptoms  EYES: No eye pain, visual disturbances, or discharge  NECK: No pain or stiffness  RESPIRATORY: No cough, wheezing, chills or hemoptysis; No Shortness of Breath  CARDIOVASCULAR: No chest pain, palpitations, passing out, dizziness, or leg swelling  GASTROINTESTINAL: No abdominal or epigastric pain. No nausea, vomiting, or hematemesis; No diarrhea or constipation. No melena or hematochezia.  GENITOURINARY: No dysuria, frequency, hematuria, or incontinence  NEUROLOGICAL: No headaches,   l foot pain no change      Medications:  MEDICATIONS  (STANDING):  cilostazol 100 milliGRAM(s) Oral two times a day  dextrose 5%. 1000 milliLiter(s) (50 mL/Hr) IV Continuous <Continuous>  dextrose 50% Injectable 12.5 Gram(s) IV Push once  gabapentin 200 milliGRAM(s) Oral three times a day  heparin  Infusion 1600 Unit(s)/Hr (16 mL/Hr) IV Continuous <Continuous>  insulin lispro (HumaLOG) corrective regimen sliding scale   SubCutaneous three times a day before meals  lidocaine   Patch 1 Patch Transdermal daily  lisinopril 10 milliGRAM(s) Oral daily    MEDICATIONS  (PRN):  acetaminophen   Tablet .. 975 milliGRAM(s) Oral every 6 hours PRN Mild Pain (1 - 3)  acetaminophen   Tablet .. 650 milliGRAM(s) Oral every 6 hours PRN Temp greater or equal to 38C (100.4F)  dextrose 40% Gel 15 Gram(s) Oral once PRN Blood Glucose LESS THAN 70 milliGRAM(s)/deciliter  glucagon  Injectable 1 milliGRAM(s) IntraMuscular once PRN Glucose LESS THAN 70 milligrams/deciliter  oxyCODONE    IR 10 milliGRAM(s) Oral every 6 hours PRN moderate and severe pain    	    PHYSICAL EXAM:  T(C): 36.9 (11-28-18 @ 05:07), Max: 37.4 (11-27-18 @ 13:10)  HR: 109 (11-28-18 @ 05:07) (102 - 113)  BP: 130/69 (11-28-18 @ 05:07) (128/80 - 144/80)  RR: 18 (11-28-18 @ 05:07) (18 - 18)  SpO2: 100% (11-28-18 @ 05:07) (95% - 100%)  Wt(kg): --  I&O's Summary    Appearance: Normal	  HEENT:   Normal oral mucosa, PERRL, EOMI	  Lymphatic: No lymphadenopathy  Cardiovascular: Normal S1 S2, No JVD, No murmurs, No edema  Respiratory: Lungs clear to auscultation	  Psychiatry: A & O x 3, Mood & affect appropriate  Gastrointestinal:  Soft, Non-tender, + BS	  Skin: No rashes, No ecchymoses, No cyanosis	  Neurologic: Non-focal  Extremities:left foot vasc / ischemic changes w/o change    LABS:	 	    CARDIAC MARKERS:                                8.8    13.14 )-----------( 574      ( 28 Nov 2018 06:22 )             28.4     11-28    137  |  101  |  8   ----------------------------<  103<H>  4.2   |  23  |  0.83    Ca    10.0      28 Nov 2018 06:22      proBNP:   Lipid Profile:   HgA1c:   TSH:

## 2018-11-28 NOTE — PROGRESS NOTE ADULT - SUBJECTIVE AND OBJECTIVE BOX
RENATA DIXON:[id number],   68yFemale followed for:  No Known Allergies    PAST MEDICAL & SURGICAL HISTORY:  DM (diabetes mellitus)  HTN (hypertension)  Breast CA  No significant past surgical history    FAMILY HISTORY:  No pertinent family history in first degree relatives    MEDICATIONS  (STANDING):  cilostazol 100 milliGRAM(s) Oral two times a day  dextrose 5%. 1000 milliLiter(s) (50 mL/Hr) IV Continuous <Continuous>  dextrose 50% Injectable 12.5 Gram(s) IV Push once  gabapentin 200 milliGRAM(s) Oral three times a day  heparin  Infusion 1600 Unit(s)/Hr (16 mL/Hr) IV Continuous <Continuous>  insulin lispro (HumaLOG) corrective regimen sliding scale   SubCutaneous three times a day before meals  lidocaine   Patch 1 Patch Transdermal daily  lisinopril 10 milliGRAM(s) Oral daily    MEDICATIONS  (PRN):  acetaminophen   Tablet .. 975 milliGRAM(s) Oral every 6 hours PRN Mild Pain (1 - 3)  acetaminophen   Tablet .. 650 milliGRAM(s) Oral every 6 hours PRN Temp greater or equal to 38C (100.4F)  dextrose 40% Gel 15 Gram(s) Oral once PRN Blood Glucose LESS THAN 70 milliGRAM(s)/deciliter  glucagon  Injectable 1 milliGRAM(s) IntraMuscular once PRN Glucose LESS THAN 70 milligrams/deciliter  oxyCODONE    IR 10 milliGRAM(s) Oral every 6 hours PRN moderate and severe pain      Vital Signs Last 24 Hrs  T(C): 36.9 (28 Nov 2018 05:07), Max: 37.4 (27 Nov 2018 13:10)  T(F): 98.5 (28 Nov 2018 05:07), Max: 99.3 (27 Nov 2018 13:10)  HR: 109 (28 Nov 2018 05:07) (102 - 113)  BP: 130/69 (28 Nov 2018 05:07) (128/80 - 144/80)  BP(mean): --  RR: 18 (28 Nov 2018 05:07) (18 - 18)  SpO2: 100% (28 Nov 2018 05:07) (95% - 100%)  nc/at  s1s2  cta  soft, nt, nd no guarding or rebound  no c/c/e    CBC Full  -  ( 28 Nov 2018 06:22 )  WBC Count : 13.14 K/uL  Hemoglobin : 8.8 g/dL  Hematocrit : 28.4 %  Platelet Count - Automated : 574 K/uL  Mean Cell Volume : 78.9 fL  Mean Cell Hemoglobin : 24.4 pg  Mean Cell Hemoglobin Concentration : 31.0 %  Auto Neutrophil # : x  Auto Lymphocyte # : x  Auto Monocyte # : x  Auto Eosinophil # : x  Auto Basophil # : x  Auto Neutrophil % : x  Auto Lymphocyte % : x  Auto Monocyte % : x  Auto Eosinophil % : x  Auto Basophil % : x    11-28    137  |  101  |  8   ----------------------------<  103<H>  4.2   |  23  |  0.83    Ca    10.0      28 Nov 2018 06:22      PTT - ( 28 Nov 2018 06:25 )  PTT:57.6 SEC

## 2018-11-29 ENCOUNTER — TRANSCRIPTION ENCOUNTER (OUTPATIENT)
Age: 68
End: 2018-11-29

## 2018-11-29 LAB
APTT BLD: 86.3 SEC — HIGH (ref 27.5–36.3)
GLUCOSE BLDC GLUCOMTR-MCNC: 145 MG/DL — HIGH (ref 70–99)
GLUCOSE BLDC GLUCOMTR-MCNC: 171 MG/DL — HIGH (ref 70–99)
GLUCOSE BLDC GLUCOMTR-MCNC: 174 MG/DL — HIGH (ref 70–99)
GLUCOSE BLDC GLUCOMTR-MCNC: 218 MG/DL — HIGH (ref 70–99)
HCT VFR BLD CALC: 33.7 % — LOW (ref 34.5–45)
HGB BLD-MCNC: 10.2 G/DL — LOW (ref 11.5–15.5)
MCHC RBC-ENTMCNC: 23.9 PG — LOW (ref 27–34)
MCHC RBC-ENTMCNC: 30.3 % — LOW (ref 32–36)
MCV RBC AUTO: 79.1 FL — LOW (ref 80–100)
NRBC # FLD: 0 — SIGNIFICANT CHANGE UP
PLATELET # BLD AUTO: 682 K/UL — HIGH (ref 150–400)
PMV BLD: 9.2 FL — SIGNIFICANT CHANGE UP (ref 7–13)
RBC # BLD: 4.26 M/UL — SIGNIFICANT CHANGE UP (ref 3.8–5.2)
RBC # FLD: 16.7 % — HIGH (ref 10.3–14.5)
WBC # BLD: 13.08 K/UL — HIGH (ref 3.8–10.5)
WBC # FLD AUTO: 13.08 K/UL — HIGH (ref 3.8–10.5)

## 2018-11-29 PROCEDURE — 99232 SBSQ HOSP IP/OBS MODERATE 35: CPT

## 2018-11-29 RX ORDER — DEXTROSE 50 % IN WATER 50 %
15 SYRINGE (ML) INTRAVENOUS
Qty: 0 | Refills: 0 | COMMUNITY
Start: 2018-11-29

## 2018-11-29 RX ORDER — ACETAMINOPHEN 500 MG
3 TABLET ORAL
Qty: 0 | Refills: 0 | COMMUNITY
Start: 2018-11-29

## 2018-11-29 RX ORDER — SODIUM CHLORIDE 9 MG/ML
1000 INJECTION, SOLUTION INTRAVENOUS
Qty: 0 | Refills: 0 | COMMUNITY
Start: 2018-11-29

## 2018-11-29 RX ORDER — OXYCODONE HYDROCHLORIDE 5 MG/1
10 TABLET ORAL EVERY 6 HOURS
Qty: 0 | Refills: 0 | Status: DISCONTINUED | OUTPATIENT
Start: 2018-11-29 | End: 2018-11-29

## 2018-11-29 RX ORDER — OXYCODONE HYDROCHLORIDE 5 MG/1
1 TABLET ORAL
Qty: 0 | Refills: 0 | COMMUNITY
Start: 2018-11-29

## 2018-11-29 RX ORDER — ACETAMINOPHEN 500 MG
2 TABLET ORAL
Qty: 0 | Refills: 0 | COMMUNITY
Start: 2018-11-29

## 2018-11-29 RX ORDER — CILOSTAZOL 100 MG/1
1 TABLET ORAL
Qty: 0 | Refills: 0 | COMMUNITY
Start: 2018-11-29

## 2018-11-29 RX ORDER — HEPARIN SODIUM 5000 [USP'U]/ML
25000 INJECTION INTRAVENOUS; SUBCUTANEOUS
Qty: 0 | Refills: 0 | COMMUNITY
Start: 2018-11-29

## 2018-11-29 RX ORDER — GABAPENTIN 400 MG/1
2 CAPSULE ORAL
Qty: 0 | Refills: 0 | COMMUNITY
Start: 2018-11-29

## 2018-11-29 RX ORDER — ONDANSETRON 8 MG/1
4 TABLET, FILM COATED ORAL ONCE
Qty: 0 | Refills: 0 | Status: DISCONTINUED | OUTPATIENT
Start: 2018-11-29 | End: 2018-11-29

## 2018-11-29 RX ORDER — LIDOCAINE 4 G/100G
1 CREAM TOPICAL
Qty: 0 | Refills: 0 | COMMUNITY
Start: 2018-11-29

## 2018-11-29 RX ORDER — DEXTROSE 50 % IN WATER 50 %
25 SYRINGE (ML) INTRAVENOUS
Qty: 0 | Refills: 0 | COMMUNITY
Start: 2018-11-29

## 2018-11-29 RX ORDER — LISINOPRIL 2.5 MG/1
1 TABLET ORAL
Qty: 0 | Refills: 0 | COMMUNITY
Start: 2018-11-29

## 2018-11-29 RX ADMIN — Medication 125 MILLIGRAM(S): at 06:11

## 2018-11-29 RX ADMIN — GABAPENTIN 200 MILLIGRAM(S): 400 CAPSULE ORAL at 14:13

## 2018-11-29 RX ADMIN — CILOSTAZOL 100 MILLIGRAM(S): 100 TABLET ORAL at 06:11

## 2018-11-29 RX ADMIN — Medication 1: at 18:27

## 2018-11-29 RX ADMIN — HEPARIN SODIUM 16 UNIT(S)/HR: 5000 INJECTION INTRAVENOUS; SUBCUTANEOUS at 07:03

## 2018-11-29 RX ADMIN — GABAPENTIN 200 MILLIGRAM(S): 400 CAPSULE ORAL at 18:29

## 2018-11-29 RX ADMIN — GABAPENTIN 200 MILLIGRAM(S): 400 CAPSULE ORAL at 21:58

## 2018-11-29 RX ADMIN — CILOSTAZOL 100 MILLIGRAM(S): 100 TABLET ORAL at 18:28

## 2018-11-29 RX ADMIN — GABAPENTIN 200 MILLIGRAM(S): 400 CAPSULE ORAL at 06:10

## 2018-11-29 RX ADMIN — LISINOPRIL 10 MILLIGRAM(S): 2.5 TABLET ORAL at 06:10

## 2018-11-29 RX ADMIN — Medication 2: at 14:12

## 2018-11-29 NOTE — PROGRESS NOTE ADULT - ASSESSMENT
1. 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 Low suspicion for APLS as her  b2 glycoprotein Abs neg and anticardiolipin IgG and IgM neg  -- vessels are shown to be patent on imaging. Would d/c heparin since no evidence for APLS if mikey objection from Vascular  -- f/u vascular, ID, and rheum  -- off of Abx per ID  -- ? amputation    2. anemia     -- MCV low, ferritin elevated to 500s but may be seen in acute setting. HbEP normal  -- may still have KRIS vs alpha thal  -- would need to repeat Fe panel and check alpha thal as outpt  -- adequate, monitor  -- No B12/Folate Def  -- No Hemolysis  -- No monoclonal gammopathy on SPEP or TANNER    3. thrombocytosis     -- likely reactive, monitor for now  -- No indication for cytoreduction    4. Side branch IPMN     -- GI Input appreciated  -- ca19-9 not elevated  -- outpt follow up    Olga Gerardo MD  [phone]

## 2018-11-29 NOTE — CHART NOTE - NSCHARTNOTEFT_GEN_A_CORE
Received call from Dr. Ochoa that pt and daughter wants to be transferred to [facility] at [address].  Spoke with pt and daughter at length prior to signing transfer certificate.  Lab result and most recent note from Dr. Ochoa and dc note faxed to [email or web address] at .  Awaiting for call back if pt is accepted for transfer.

## 2018-11-29 NOTE — PROGRESS NOTE ADULT - SUBJECTIVE AND OBJECTIVE BOX
RENATA DIXON:[id number],   68yFemale followed for:  No Known Allergies    PAST MEDICAL & SURGICAL HISTORY:  DM (diabetes mellitus)  HTN (hypertension)  Breast CA  No significant past surgical history    FAMILY HISTORY:  No pertinent family history in first degree relatives    MEDICATIONS  (STANDING):  cilostazol 100 milliGRAM(s) Oral two times a day  dextrose 5%. 1000 milliLiter(s) (50 mL/Hr) IV Continuous <Continuous>  dextrose 50% Injectable 12.5 Gram(s) IV Push once  gabapentin 200 milliGRAM(s) Oral three times a day  heparin  Infusion 1600 Unit(s)/Hr (16 mL/Hr) IV Continuous <Continuous>  insulin lispro (HumaLOG) corrective regimen sliding scale   SubCutaneous three times a day before meals  lidocaine   Patch 1 Patch Transdermal daily  lisinopril 10 milliGRAM(s) Oral daily  methylPREDNISolone sodium succinate Injectable 125 milliGRAM(s) IV Push daily    MEDICATIONS  (PRN):  acetaminophen   Tablet .. 975 milliGRAM(s) Oral every 6 hours PRN Mild Pain (1 - 3)  acetaminophen   Tablet .. 650 milliGRAM(s) Oral every 6 hours PRN Temp greater or equal to 38C (100.4F)  dextrose 40% Gel 15 Gram(s) Oral once PRN Blood Glucose LESS THAN 70 milliGRAM(s)/deciliter  glucagon  Injectable 1 milliGRAM(s) IntraMuscular once PRN Glucose LESS THAN 70 milligrams/deciliter      Vital Signs Last 24 Hrs  T(C): 36.3 (29 Nov 2018 06:02), Max: 37.8 (28 Nov 2018 13:39)  T(F): 97.4 (29 Nov 2018 06:02), Max: 100 (28 Nov 2018 13:39)  HR: 96 (29 Nov 2018 06:02) (96 - 116)  BP: 131/81 (29 Nov 2018 06:02) (124/71 - 132/75)  BP(mean): --  RR: 18 (29 Nov 2018 06:02) (18 - 18)  SpO2: 95% (29 Nov 2018 06:02) (94% - 99%)  nc/at  s1s2  cta  soft, nt, nd no guarding or rebound  no c/c/e    CBC Full  -  ( 29 Nov 2018 05:00 )  WBC Count : 13.08 K/uL  Hemoglobin : 10.2 g/dL  Hematocrit : 33.7 %  Platelet Count - Automated : 682 K/uL  Mean Cell Volume : 79.1 fL  Mean Cell Hemoglobin : 23.9 pg  Mean Cell Hemoglobin Concentration : 30.3 %  Auto Neutrophil # : x  Auto Lymphocyte # : x  Auto Monocyte # : x  Auto Eosinophil # : x  Auto Basophil # : x  Auto Neutrophil % : x  Auto Lymphocyte % : x  Auto Monocyte % : x  Auto Eosinophil % : x  Auto Basophil % : x    11-28    137  |  101  |  8   ----------------------------<  103<H>  4.2   |  23  |  0.83    Ca    10.0      28 Nov 2018 06:22      PTT - ( 29 Nov 2018 05:00 )  PTT:86.3 SEC

## 2018-11-29 NOTE — DISCHARGE NOTE ADULT - PROVIDER TOKENS
FREE:[LAST:[[facility]],PHONE:[phone]],FAX:[(   )    -]] PANCHO:'43:MIIS:43',FREE:[LAST:[[facility]],PHONE:[phone]],FAX:[(   )    -]],FREE:[LAST:[[facility]],PHONE:[phone]],FAX:[(   )    -]],FREE:[LAST:[Deshaun],FIRST:[Kamal],PHONE:[(   )    -],FAX:[(   )    -],ADDRESS:[PCP]]

## 2018-11-29 NOTE — DISCHARGE NOTE ADULT - PLAN OF CARE
Monitor for improvement Monitor blood glucose and continue taking your medication as prescribed- Minimize end organ damage Continue taking your blood pressure medication as prescribed Monitor hemoglobin and hematocrit, follow up with your doctor. Infectious disease, podiatry, and vascular medicine were consulted - Antibiotics initiated and completed - It is recommended to follow-up with podiatry/wound care center as outpatient for a hyperbaric oxygen evaluation ** Please call [phone] for an appointment and please follow-up with vascular and podiatry closely for further care/recommendations Continue your medication regimen and a consistent carbohydrate diet (Meaning eating the same amount of carbohydrates at the same time each day). Monitor blood glucose levels throughout the day before meals and at bedtime. Record blood sugars and bring to outpatient providers appointment in order to be reviewed by your doctor for management modifications. Monitor for signs/symptoms of low blood glucose, such as, dizziness, altered mental status, or cool/clammy skin. In addition, monitor for signs/symptoms of high blood glucose, such as, feeling hot, dry, fatigued, or with increased thirst/urination. Make regular podiatry appointments in order to have feet checked for wounds and uncontrolled toe nail growth to prevent infections, as well as, appointments with an ophthalmologist to monitor your vision. Continue blood pressure medication regimen as directed. Monitor for any visual changes, headaches or dizziness.  Monitor blood pressure regularly.  Follow up with your PCP for further management for high blood pressure. Follow-up with your outpatient provider for further care/recommendations. Monitor for signs/symptoms indicating worsening of disease, such as, easy bleeding/bruising, pale skin, fatigue, dizziness, increased heart rate, or chest pain. Gastrointestinal medicine was consulted - No intervention was warranted - CT scan of the abdomen/pelvis was performed and it is recommended to repeat a CT scan of the abdomen as outpatient for further monitoring in 1 year - Refer to primary care provider for CT scan prescription. ** Rheumatology was consulted and you were started on a steroid taper for a potential rheumatological ailment. There is a lab result pending - It is recommended to continue your steroid taper as directed and follow-up with rheumatology as outpatient to discuss pending results. Call [phone] Adrenal nodule and pancreatic cyst noted on imaging - Gastrointestinal medicine was consulted - No intervention was warranted - CT scan of the abdomen/pelvis was performed and it is recommended to repeat a CT scan of the abdomen as outpatient for further monitoring in 1 year - Refer to primary care provider for CT scan prescription. Follow-up with your outpatient provider for further care/recommendations - Will need repeat iron panel and alpha thalassemia levels . Monitor for signs/symptoms indicating worsening of disease, such as, easy bleeding/bruising, pale skin, fatigue, dizziness, increased heart rate, or chest pain. Infectious disease, podiatry, and vascular medicine were consulted - Antibiotics initiated and completed - Vascular team performed surgery 12/14_______ ** Rheumatology was consulted and you were started on a steroid taper for a potential rheumatological ailment. It is recommended to continue your steroid taper as directed and follow-up with rheumatology as outpatient to discuss pending results. Call [phone] Infectious disease, podiatry, and vascular medicine were consulted - Antibiotics initiated and completed - It was recommended to undergo amputation surgery, however, the surgery was deferred at this time and you will attend rehab - It is strongly recommended to follow-up with vascular, podiatry, and wound care upon hospital discharge - Phone numbers are listed below. Follow-up with your outpatient provider for further care/recommendations - Will need repeat iron panel and alpha thalassemia levels within 1-3 months upon hospital discharge. Monitor for signs/symptoms indicating worsening of disease, such as, easy bleeding/bruising, pale skin, fatigue, dizziness, increased heart rate, or chest pain. Can call [facility] clinic for appointment [phone] ** Rheumatology was consulted and you were started on a steroid taper for a potential rheumatological ailment. It is recommended to continue your steroid taper as directed and follow-up with rheumatology as outpatient to discuss pending results. Call [phone] for appointment.   Prednisone taper as follows: 20 milligram(s) orally once a day for 2 more days (12/15 and 12/16) then take 15mg orally daily for 4 days then take 10mg orall daily for 4 days then take 5 mg orally daily for 4 days then stop

## 2018-11-29 NOTE — PROGRESS NOTE ADULT - ASSESSMENT
Imp:  69 yo f with L foot ischemia; unclear exact etiology.  She is a long time smoker and Diabetic, hypertensive.  Anti cardiolipin and B2glyc 1 antibodies negative.  Possible Buergers disease, though not definitive    Rec:  Agree with empiric steroids; would use short course depending on response  Pt wishing to transfer to another facility

## 2018-11-29 NOTE — DISCHARGE NOTE ADULT - HOSPITAL COURSE
68 F PMHx DM, HTN and breast CA S/P lumpectomy presents for evaluation of L foot pain, swelling and redness x1 month. Pt febrile to 101.3, tachycardic to 120s, improved after IV fluid administration. ED noted streaking up left leg, begun on broad-spectrum Abx and cultures sent. Transfer from Vascular to Medicine.     Cellulitis of foot, left  - Zosyn d'cee, Vanco d'cee, Cefazolin d'cee 11/21  - BCx negative, throat Cx negative   - US LLE with no DVT  - Vascular Cx - ischemia changes stable, no indication for vascular intervention    - Pain management - added Gabapentin and Lidoderm patch   - Bone scan-- osteo / gangrene, will likely need amputation, awaiting demarcation  - Per ID - Finn-- dry gangrene, continue observe off abx  - MRA LLE 11/23 -Unremarkable MR angiogram of the abdomen and lower extremities.     Fever, intermittent, persistent; leukocytosis, persistent   - Elevated ESR/CRP, procalcitonin 0.11  - ID Consulted - DDX includes APLS vs vasculitis     HTN   - BP well controlled on Lisinopril     DM   - A1C 6.3%  - monitor FS, ISS     Microcytic anemia  - Heme Dr Samaniego consulted  - MCV low, ferritin elevated to 500s but may be seen in acute setting. 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 Smoking cessation  - Derm- Continue to observe off abx, not cellulitis  - Pod- Foot does not appear salvageable at this time as the ischemia has progressed proximally. Pod signed off  - Vascular -- Skin biopsy suggestive of endarteritis obliterans   - 11/25 Rheum 2nd opinion Dr Goldberg- Buergers disease seems most plausible  though the diagnosis is not definitive  - Patient will most likely require LLE BKA, however will allow for medical management and full demarcation before planning any definitive surgical intervention    Lt Adrenal nodule  - MR Abd -A 2.5 cm left adrenal nodule likely an adenoma. Pancreatic cysts measuring up to 2 cm 68 F PMHx DM, HTN and breast CA S/P lumpectomy presents for evaluation of L foot pain, swelling and redness x1 month. Pt febrile to 101.3, tachycardic to 120s, improved after IV fluid administration. ED noted streaking up left leg, begun on broad-spectrum Abx and cultures sent. Transfer from Vascular to Medicine.           Dispo - Rehab 68 F PMHx DM, HTN and breast CA S/P lumpectomy presents for evaluation of L foot pain, swelling and redness x1 month. Pt febrile to 101.3, tachycardic to 120s, improved after IV fluid administration. ED noted streaking up left leg, begun on broad-spectrum Abx and cultures sent. Transfer from Vascular to Medicine.     Cellulitis of foot, left  - BCx negative, throat Cx negative   - US LLE with no DVT  - Gabapentin and Lidoderm patch   - ID Cx - monitor off Abx   - MRA LLE 11/23 - unremarkable MR angiogram of the abdomen and lower extremities.   - Bone scan 11/25 - absent flow, blood pool and tracer uptake in the L mid foot and L forefoot compatible with gangrene/osteomyelitis. Small focal uptake in the first metatarsal phalangeal region of the R foot, probably arthritic.    HTN   - Lisinopril     DM   - A1C 6.3%    Microcytic anemia  -Heme Cx  -MCV low, ferritin elevated to 500s but may be seen in acute setting. 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   -Vascular Cx - will likely need LLE BKA, awaiting demarcation and medical optimization     L adrenal nodule  -MR abdomen - 2.5 cm L adrenal nodule likely an adenoma. Pancreatic cysts measuring up to 2 cm  -GI Cx  -F/U outpatient in 1 year     Dispo - Rehab 68 F PMHx DM, HTN and breast CA S/P lumpectomy presents for evaluation of L foot pain, swelling and redness x1 month. Pt febrile to 101.3, tachycardic to 120s, improved after IV fluid administration. ED noted streaking up left leg, begun on broad-spectrum Abx and cultures sent. Transfer from Vascular to Medicine.     COURSE____ 68 F PMHx DM, HTN and breast CA S/P lumpectomy presents for evaluation of L foot pain, swelling and redness x1 month. Pt febrile to 101.3, tachycardic to 120s, improved after IV fluid administration. ED noted streaking up left leg, begun on broad-spectrum Abx and cultures sent. Transfer from Vascular to Medicine.     Cellulitis of foot, left  - S/p course of Zosyn, Vanco, and Cefazolin  - Blood cultures negative  - US LLE with no DVT  - Gabapentin and Lidoderm patch   - Infectious disease, podiatry, and vascular consulted     - Bone scan 11/25 - absent flow, blood pool and tracer uptake in the L mid foot and L forefoot compatible with gangrene/osteomyelitis. Small focal uptake in the first metatarsal phalangeal region of the R foot, probably arthritic.  - Vascular recommends below the knee amputation - Patient deferring at this time and will go to rehab with outpatient follow-up    HTN   - Lisinopril     DM   - A1C 6.3% Insulin sliding scale while hospitalized and Metformin upon DC    Microcytic anemia  - Hematology consult   - MCV low, ferritin elevated to 500s but may be seen in acute setting. 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   - Vascular Cx - will likely need LLE BKA - Patient refusing surgery at this time    - Eliquis     L adrenal nodule  - MR abdomen - 2.5 cm L adrenal nodule likely an adenoma. Pancreatic cysts measuring up to 2 cm  - GI Consult - Recommends outpatient follow-up and repeat imaging within 1 year      Dispo - Rehab

## 2018-11-29 NOTE — DISCHARGE NOTE ADULT - MEDICATION SUMMARY - MEDICATIONS TO TAKE
I will START or STAY ON the medications listed below when I get home from the hospital:    methylPREDNISolone  -- 125 milligram(s)  once a day  -- Indication: For Vasculitis    oxyCODONE 10 mg oral tablet  -- 1 tab(s) by mouth every 6 hours, As needed, Severe Pain (7 - 10)  -- Indication: For Pain    acetaminophen 325 mg oral tablet  -- 2 tab(s) by mouth every 6 hours, As needed, Temp greater or equal to 38C (100.4F)  -- Indication: For Fever    acetaminophen 325 mg oral tablet  -- 3 tab(s) by mouth every 6 hours, As needed, Mild Pain (1 - 3)  -- Indication: For Pain    lisinopril 10 mg oral tablet  -- 1 tab(s) by mouth once a day  -- Indication: For HTN (hypertension)    lisinopril 10 mg oral tablet  -- 1 tab(s) by mouth once a day  -- Indication: For HTN (hypertension)    heparin  -- 83461 unit(s) intravenously once a day- Left leg necrosis- hypercoagualopathy  -- Indication: For Necrotis    gabapentin 100 mg oral capsule  -- 2 cap(s) by mouth 3 times a day  -- Indication: For Pain    metFORMIN 500 mg oral tablet, extended release  -- 1 tab(s) by mouth 3 times a day (with meals)  -- Indication: For DM (diabetes mellitus)    lidocaine 5% topical film  -- Apply on skin to affected area    -- Indication: For Pain    glucose 40% oral gel  -- 15 gram(s) orally  -- Indication: For DM (diabetes mellitus)    glucose 50% intravenous solution  -- 25 milliliter(s) intravenous once  -- Indication: For DM (diabetes mellitus)    Dextrose 5% in Water intravenous solution  -- 1000 milliliter(s) intravenous   -- Indication: For DM (diabetes mellitus)    cilostazol 100 mg oral tablet  -- 1 tab(s) by mouth 2 times a day  -- Indication: For Poor circulation I will START or STAY ON the medications listed below when I get home from the hospital:    methylPREDNISolone  -- 125 milligram(s)  once a day  -- Indication: For Vasculitis    oxyCODONE 10 mg oral tablet  -- 1 tab(s) by mouth every 6 hours, As needed, Severe Pain (7 - 10)  -- Indication: For Pain    acetaminophen 325 mg oral tablet  -- 2 tab(s) by mouth every 6 hours, As needed, Temp greater or equal to 38C (100.4F)  -- Indication: For Fever    acetaminophen 325 mg oral tablet  -- 3 tab(s) by mouth every 6 hours, As needed, Mild Pain (1 - 3)  -- Indication: For Pain    lisinopril 10 mg oral tablet  -- 1 tab(s) by mouth once a day  -- Indication: For HTN (hypertension)    lisinopril 10 mg oral tablet  -- 1 tab(s) by mouth once a day  -- Indication: For HTN (hypertension)    heparin  -- 92504 unit(s) intravenously once a day- Left leg necrosis- hypercoagualopathy  -- Indication: For Necrotis    gabapentin 100 mg oral capsule  -- 2 cap(s) by mouth 3 times a day  -- Indication: For Pain    metFORMIN 500 mg oral tablet, extended release  -- 1 tab(s) by mouth 3 times a day (with meals)  -- Indication: For DM (diabetes mellitus)    lidocaine 5% topical film  -- Apply on skin to affected area    -- Indication: For Pain    glucose 40% oral gel  -- 15 gram(s) orally  -- Indication: For DM (diabetes mellitus)    glucose 50% intravenous solution  -- 25 milliliter(s) intravenous once  -- Indication: For DM (diabetes mellitus)    Dextrose 5% in Water intravenous solution  -- 1000 milliliter(s) intravenous   -- Indication: For DM (diabetes mellitus)    cilostazol 100 mg oral tablet  -- 1 tab(s) by mouth 2 times a day  -- Indication: For Poor circulation I will START or STAY ON the medications listed below when I get home from the hospital:    predniSONE  -- 20mg by mouth daily for 2 more days (12/15 and 12/16) then take 15mg by mouth daily for 4 days then take 10mg by mouth daily for 4 days then take 5 mg by mouth daily for 4 days then stop   -- Indication: For Rheumatological ailment    oxyCODONE 10 mg oral tablet  -- 1 tab(s) by mouth every 6 hours, As needed, Severe Pain (7 - 10)  -- Indication: For Pain    acetaminophen 325 mg oral tablet  -- 3 tab(s) by mouth every 6 hours, As needed, Mild Pain (1 - 3)  -- Indication: For Pain    lisinopril 10 mg oral tablet  -- 1 tab(s) by mouth once a day  -- Indication: For HTN (hypertension)    apixaban 5 mg oral tablet  -- 1 tab(s) by mouth every 12 hours  -- Indication: For VTE    gabapentin 100 mg oral capsule  -- 2 cap(s) by mouth 3 times a day  -- Indication: For Pain    metFORMIN 500 mg oral tablet, extended release  -- 1 tab(s) by mouth 3 times a day (with meals)  -- Indication: For DM (diabetes mellitus)    lidocaine 5% topical film  -- Apply on skin to affected area    -- Indication: For Pain    docusate sodium 100 mg oral capsule  -- 1 cap(s) by mouth 3 times a day, As needed, Constipation  -- Indication: For Constipation    cilostazol 100 mg oral tablet  -- 1 tab(s) by mouth 2 times a day  -- Indication: For Poor circulation

## 2018-11-29 NOTE — PROGRESS NOTE ADULT - ASSESSMENT
68F with DM, HTN, weight loss, admitted on 11/14/18 with left foot pain of several months duration  Left foot pain/ fever/ leukocytosis  Treated empirically for cellulitis with 7 day course of abx through 7/21  Seen by rheumatology.  Pathology with "endarteritis obliterans". Rheumatology DDx is  Buergers thromboangiitis obliterans or livedoid vasculopathy . This patient has been smoking since age 19. She states she has quit smoking since just prior to current admission.  Anti-phopholipid serologies are negative  On therapeutic steroid trial: Solumedrol 125 daily 11/28-->    Ischemic foot with dry gangrene     Suggest:  Continue to observe off abx.  I am hoping that steroid duration can be minimized unless clear therapeutic response

## 2018-11-29 NOTE — PROGRESS NOTE ADULT - SUBJECTIVE AND OBJECTIVE BOX
Follow Up:   ischemic left foot    Interval History/ROS: left foot discomfort is about the same -- tolerable    Allergies  No Known Allergies    ANTIMICROBIALS:      OTHER MEDS:  MEDICATIONS  (STANDING):  acetaminophen   Tablet .. 975 every 6 hours PRN  acetaminophen   Tablet .. 650 every 6 hours PRN  cilostazol 100 two times a day  dextrose 40% Gel 15 once PRN  dextrose 50% Injectable 12.5 once  gabapentin 200 three times a day  glucagon  Injectable 1 once PRN  heparin  Infusion 1600 <Continuous>  insulin lispro (HumaLOG) corrective regimen sliding scale  three times a day before meals  lisinopril 10 daily  methylPREDNISolone sodium succinate Injectable 125 daily  oxyCODONE    IR 10 every 6 hours PRN      Vital Signs Last 24 Hrs  T(C): 36.9 (29 Nov 2018 13:14), Max: 37 (28 Nov 2018 20:57)  T(F): 98.4 (29 Nov 2018 13:14), Max: 98.6 (28 Nov 2018 20:57)  HR: 110 (29 Nov 2018 13:14) (96 - 113)  BP: 102/67 (29 Nov 2018 13:14) (102/67 - 132/75)  BP(mean): --  RR: 17 (29 Nov 2018 13:14) (17 - 18)  SpO2: 99% (29 Nov 2018 13:14) (94% - 99%)    PHYSICAL EXAM:  General: WN/WD NAD, Non-toxic  Neurology: A&Ox3, nonfocal  Respiratory: no distress  Extremities: dry gangrene left toes with discoloration of forefoot  Line Sites: Clear  Skin: No rash                        10.2   13.08 )-----------( 682      ( 29 Nov 2018 05:00 )             33.7   WBC Count: 13.08 (11-29 @ 05:00)  WBC Count: 13.14 (11-28 @ 06:22)  WBC Count: 12.51 (11-27 @ 05:35)  WBC Count: 14.56 (11-26 @ 14:50)  WBC Count: 14.28 (11-26 @ 05:47)  WBC Count: 13.60 (11-25 @ 08:20)  WBC Count: 11.80 (11-25 @ 05:35)         11-28    137  |  101  |  8   ----------------------------<  103<H>  4.2   |  23  |  0.83    Ca    10.0      28 Nov 2018 06:22    Anticardiolipin Antibody Level, Total (11.17.18 @ 06:45)  NORMAL    Anticardiolipin IgG, Serum: 7.97 GPL    Anticardiolipin IgM, Serum: 0.73 MPL    MICROBIOLOGY:  THROAT  11-19-18 --  --  --      BLOOD PERIPHERAL  11-18-18 --  --  --      BLOOD  11-14-18 --  --  --    RADIOLOGY:    Dayne Briseno MD; Division of Infectious Disease; Pager: [phone]; nights and weekends: [phone]

## 2018-11-29 NOTE — PROGRESS NOTE ADULT - SUBJECTIVE AND OBJECTIVE BOX
Chief complaint: L foot ischemia  Interval hx: no acute events    PAST MEDICAL & SURGICAL HISTORY:  DM (diabetes mellitus)  HTN (hypertension)  Breast CA  No significant past surgical history          REVIEW OF SYSTEMS:  CONSTITUTIONAL: No change in symptoms  EYES: No eye pain, visual disturbances, or discharge  NECK: No pain or stiffness  RESPIRATORY: No cough, wheezing, chills or hemoptysis; No Shortness of Breath  CARDIOVASCULAR: No chest pain, palpitations, passing out, dizziness, or leg swelling  GASTROINTESTINAL: No abdominal or epigastric pain. No nausea, vomiting, or hematemesis; No diarrhea or constipation. No melena or hematochezia.  GENITOURINARY: No dysuria, frequency, hematuria, or incontinence  NEUROLOGICAL: No headaches,   l foot pain no change      Medications:  MEDICATIONS  (STANDING):  cilostazol 100 milliGRAM(s) Oral two times a day  dextrose 5%. 1000 milliLiter(s) (50 mL/Hr) IV Continuous <Continuous>  dextrose 50% Injectable 12.5 Gram(s) IV Push once  gabapentin 200 milliGRAM(s) Oral three times a day  heparin  Infusion 1600 Unit(s)/Hr (16 mL/Hr) IV Continuous <Continuous>  insulin lispro (HumaLOG) corrective regimen sliding scale   SubCutaneous three times a day before meals  lidocaine   Patch 1 Patch Transdermal daily  lisinopril 10 milliGRAM(s) Oral daily  methylPREDNISolone sodium succinate Injectable 125 milliGRAM(s) IV Push daily    MEDICATIONS  (PRN):  acetaminophen   Tablet .. 975 milliGRAM(s) Oral every 6 hours PRN Mild Pain (1 - 3)  acetaminophen   Tablet .. 650 milliGRAM(s) Oral every 6 hours PRN Temp greater or equal to 38C (100.4F)  dextrose 40% Gel 15 Gram(s) Oral once PRN Blood Glucose LESS THAN 70 milliGRAM(s)/deciliter  glucagon  Injectable 1 milliGRAM(s) IntraMuscular once PRN Glucose LESS THAN 70 milligrams/deciliter  oxyCODONE    IR 10 milliGRAM(s) Oral every 6 hours PRN Severe Pain (7 - 10)    	    PHYSICAL EXAM:  T(C): 36.3 (11-29-18 @ 06:02), Max: 37.8 (11-28-18 @ 13:39)  HR: 96 (11-29-18 @ 06:02) (96 - 116)  BP: 131/81 (11-29-18 @ 06:02) (124/71 - 132/75)  RR: 18 (11-29-18 @ 06:02) (18 - 18)  SpO2: 95% (11-29-18 @ 06:02) (94% - 99%)  Wt(kg): --  I&O's Summary    Appearance: Normal	  HEENT:   Normal oral mucosa, PERRL, EOMI	  Lymphatic: No lymphadenopathy  Cardiovascular: Normal S1 S2, No JVD, No murmurs, No edema  Respiratory: Lungs clear to auscultation	  Psychiatry: A & O x 3, Mood & affect appropriate  Gastrointestinal:  Soft, Non-tender, + BS	  Skin: No rashes, No ecchymoses, No cyanosis	  Neurologic: Non-focal  Extremities:left foot vasc / ischemic changes w/o change    LABS:	 	    CARDIAC MARKERS:                                10.2   13.08 )-----------( 682      ( 29 Nov 2018 05:00 )             33.7     11-28    137  |  101  |  8   ----------------------------<  103<H>  4.2   |  23  |  0.83    Ca    10.0      28 Nov 2018 06:22      proBNP:   Lipid Profile:   HgA1c:   TSH:

## 2018-11-29 NOTE — PROGRESS NOTE ADULT - SUBJECTIVE AND OBJECTIVE BOX
INTERVAL HPI/OVERNIGHT EVENTS:  Pt expressing frustration with lack of progress.  started empiric steroids yesterday    MEDICATIONS  (STANDING):  cilostazol 100 milliGRAM(s) Oral two times a day  dextrose 5%. 1000 milliLiter(s) (50 mL/Hr) IV Continuous <Continuous>  dextrose 50% Injectable 12.5 Gram(s) IV Push once  gabapentin 200 milliGRAM(s) Oral three times a day  heparin  Infusion 1600 Unit(s)/Hr (16 mL/Hr) IV Continuous <Continuous>  insulin lispro (HumaLOG) corrective regimen sliding scale   SubCutaneous three times a day before meals  lidocaine   Patch 1 Patch Transdermal daily  lisinopril 10 milliGRAM(s) Oral daily  methylPREDNISolone sodium succinate Injectable 125 milliGRAM(s) IV Push daily    MEDICATIONS  (PRN):  acetaminophen   Tablet .. 975 milliGRAM(s) Oral every 6 hours PRN Mild Pain (1 - 3)  acetaminophen   Tablet .. 650 milliGRAM(s) Oral every 6 hours PRN Temp greater or equal to 38C (100.4F)  dextrose 40% Gel 15 Gram(s) Oral once PRN Blood Glucose LESS THAN 70 milliGRAM(s)/deciliter  glucagon  Injectable 1 milliGRAM(s) IntraMuscular once PRN Glucose LESS THAN 70 milligrams/deciliter  oxyCODONE    IR 10 milliGRAM(s) Oral every 6 hours PRN Severe Pain (7 - 10)      Allergies    No Known Allergies    Intolerances        REVIEW OF SYSTEMS    General:	    No other complaints    Vital Signs Last 24 Hrs  T(C): 36.9 (29 Nov 2018 13:14), Max: 37 (28 Nov 2018 20:57)  T(F): 98.4 (29 Nov 2018 13:14), Max: 98.6 (28 Nov 2018 20:57)  HR: 110 (29 Nov 2018 13:14) (96 - 113)  BP: 102/67 (29 Nov 2018 13:14) (102/67 - 132/75)  BP(mean): --  RR: 17 (29 Nov 2018 13:14) (17 - 18)  SpO2: 99% (29 Nov 2018 13:14) (94% - 99%)      PHYSICAL EXAM:    Constitutional:  fair appearing      Extremities:  L foot with dry gangrene; no significant progression, some degree of redness    Vascular:    Neurological:    Skin:    Lymph Nodes:    Musculoskeletal:    Psychiatric:  Pt frustrated; wants to go to another hospital      LABS:                        10.2   13.08 )-----------( 682      ( 29 Nov 2018 05:00 )             33.7     11-28    137  |  101  |  8   ----------------------------<  103<H>  4.2   |  23  |  0.83    Ca    10.0      28 Nov 2018 06:22      PTT - ( 29 Nov 2018 05:00 )  PTT:86.3 SEC    Anti-Nuclear Antibody in AM (11.21.18 @ 05:50)    Anti Nuclear Factor Titer: Negative: Resulted by Rule Reflex_ANA_[employer]al[employer]    Beta 2 Glycoprotein 1 Antibody Screen (11.17.18 @ 06:45)    Beta 2 Glycoprotein 1 Antibody Screen: Negative: REF RANGE            Negative    ANALYSIS PERFORMED BY:  [email or web address]  [address] [place], [place] [address]  Phone: [phone]  Fax:   [phone]  :           FRANCISCO JOHNSON MD          RADIOLOGY & ADDITIONAL TESTS:

## 2018-11-29 NOTE — DISCHARGE NOTE ADULT - CARE PROVIDER_API CALL
[facility],   Phone: [phone]  Fax: (   )    - Stephen Lema), Vascular Surgery  [address] [facility]  [address] [place] [address]  Phone: [phone]  Fax: [phone]    [facility],   Phone: [phone]  Fax: (   )    -    [facility],   Phone: [phone]  Fax: (   )    -    Ellen Meade  PCP  Phone: (   )    -  Fax: (   )    -

## 2018-11-29 NOTE — DISCHARGE NOTE ADULT - CARE PLAN
Principal Discharge DX:	Cellulitis of foot, left  Goal:	Monitor for improvement  Secondary Diagnosis:	DM (diabetes mellitus)  Assessment and plan of treatment:	Monitor blood glucose and continue taking your medication as prescribed-  Secondary Diagnosis:	HTN (hypertension)  Goal:	Minimize end organ damage  Assessment and plan of treatment:	Continue taking your blood pressure medication as prescribed  Secondary Diagnosis:	Microcytic anemia  Assessment and plan of treatment:	Monitor hemoglobin and hematocrit, follow up with your doctor. Principal Discharge DX:	Cellulitis of foot, left  Goal:	Monitor for improvement  Assessment and plan of treatment:	Infectious disease, podiatry, and vascular medicine were consulted - Antibiotics initiated and completed - It is recommended to follow-up with podiatry/wound care center as outpatient for a hyperbaric oxygen evaluation ** Please call [phone] for an appointment and please follow-up with vascular and podiatry closely for further care/recommendations  Secondary Diagnosis:	DM (diabetes mellitus)  Assessment and plan of treatment:	Continue your medication regimen and a consistent carbohydrate diet (Meaning eating the same amount of carbohydrates at the same time each day). Monitor blood glucose levels throughout the day before meals and at bedtime. Record blood sugars and bring to outpatient providers appointment in order to be reviewed by your doctor for management modifications. Monitor for signs/symptoms of low blood glucose, such as, dizziness, altered mental status, or cool/clammy skin. In addition, monitor for signs/symptoms of high blood glucose, such as, feeling hot, dry, fatigued, or with increased thirst/urination. Make regular podiatry appointments in order to have feet checked for wounds and uncontrolled toe nail growth to prevent infections, as well as, appointments with an ophthalmologist to monitor your vision.  Secondary Diagnosis:	HTN (hypertension)  Goal:	Minimize end organ damage  Assessment and plan of treatment:	Continue blood pressure medication regimen as directed. Monitor for any visual changes, headaches or dizziness.  Monitor blood pressure regularly.  Follow up with your PCP for further management for high blood pressure.  Secondary Diagnosis:	Microcytic anemia  Assessment and plan of treatment:	Follow-up with your outpatient provider for further care/recommendations. Monitor for signs/symptoms indicating worsening of disease, such as, easy bleeding/bruising, pale skin, fatigue, dizziness, increased heart rate, or chest pain.  Secondary Diagnosis:	Pancreatic cyst  Assessment and plan of treatment:	Gastrointestinal medicine was consulted - No intervention was warranted - CT scan of the abdomen/pelvis was performed and it is recommended to repeat a CT scan of the abdomen as outpatient for further monitoring in 1 year - Refer to primary care provider for CT scan prescription.  Assessment and plan of treatment:	** Rheumatology was consulted and you were started on a steroid taper for a potential rheumatological ailment. There is a lab result pending - It is recommended to continue your steroid taper as directed and follow-up with rheumatology as outpatient to discuss pending results. Call [phone] Principal Discharge DX:	Cellulitis of foot, left  Goal:	Monitor for improvement  Assessment and plan of treatment:	Infectious disease, podiatry, and vascular medicine were consulted - Antibiotics initiated and completed - It is recommended to follow-up with podiatry/wound care center as outpatient for a hyperbaric oxygen evaluation ** Please call [phone] for an appointment and please follow-up with vascular and podiatry closely for further care/recommendations  Secondary Diagnosis:	DM (diabetes mellitus)  Assessment and plan of treatment:	Continue your medication regimen and a consistent carbohydrate diet (Meaning eating the same amount of carbohydrates at the same time each day). Monitor blood glucose levels throughout the day before meals and at bedtime. Record blood sugars and bring to outpatient providers appointment in order to be reviewed by your doctor for management modifications. Monitor for signs/symptoms of low blood glucose, such as, dizziness, altered mental status, or cool/clammy skin. In addition, monitor for signs/symptoms of high blood glucose, such as, feeling hot, dry, fatigued, or with increased thirst/urination. Make regular podiatry appointments in order to have feet checked for wounds and uncontrolled toe nail growth to prevent infections, as well as, appointments with an ophthalmologist to monitor your vision.  Secondary Diagnosis:	HTN (hypertension)  Goal:	Minimize end organ damage  Assessment and plan of treatment:	Continue blood pressure medication regimen as directed. Monitor for any visual changes, headaches or dizziness.  Monitor blood pressure regularly.  Follow up with your PCP for further management for high blood pressure.  Secondary Diagnosis:	Microcytic anemia  Assessment and plan of treatment:	Follow-up with your outpatient provider for further care/recommendations - Will need repeat iron panel and alpha thalassemia levels . Monitor for signs/symptoms indicating worsening of disease, such as, easy bleeding/bruising, pale skin, fatigue, dizziness, increased heart rate, or chest pain.  Secondary Diagnosis:	Pancreatic cyst  Assessment and plan of treatment:	Adrenal nodule and pancreatic cyst noted on imaging - Gastrointestinal medicine was consulted - No intervention was warranted - CT scan of the abdomen/pelvis was performed and it is recommended to repeat a CT scan of the abdomen as outpatient for further monitoring in 1 year - Refer to primary care provider for CT scan prescription.  Assessment and plan of treatment:	** Rheumatology was consulted and you were started on a steroid taper for a potential rheumatological ailment. There is a lab result pending - It is recommended to continue your steroid taper as directed and follow-up with rheumatology as outpatient to discuss pending results. Call [phone] Principal Discharge DX:	Cellulitis of foot, left  Goal:	Monitor for improvement  Assessment and plan of treatment:	Infectious disease, podiatry, and vascular medicine were consulted - Antibiotics initiated and completed - Vascular team performed surgery 12/14_______  Secondary Diagnosis:	DM (diabetes mellitus)  Assessment and plan of treatment:	Continue your medication regimen and a consistent carbohydrate diet (Meaning eating the same amount of carbohydrates at the same time each day). Monitor blood glucose levels throughout the day before meals and at bedtime. Record blood sugars and bring to outpatient providers appointment in order to be reviewed by your doctor for management modifications. Monitor for signs/symptoms of low blood glucose, such as, dizziness, altered mental status, or cool/clammy skin. In addition, monitor for signs/symptoms of high blood glucose, such as, feeling hot, dry, fatigued, or with increased thirst/urination. Make regular podiatry appointments in order to have feet checked for wounds and uncontrolled toe nail growth to prevent infections, as well as, appointments with an ophthalmologist to monitor your vision.  Secondary Diagnosis:	HTN (hypertension)  Goal:	Minimize end organ damage  Assessment and plan of treatment:	Continue blood pressure medication regimen as directed. Monitor for any visual changes, headaches or dizziness.  Monitor blood pressure regularly.  Follow up with your PCP for further management for high blood pressure.  Secondary Diagnosis:	Microcytic anemia  Assessment and plan of treatment:	Follow-up with your outpatient provider for further care/recommendations - Will need repeat iron panel and alpha thalassemia levels . Monitor for signs/symptoms indicating worsening of disease, such as, easy bleeding/bruising, pale skin, fatigue, dizziness, increased heart rate, or chest pain.  Secondary Diagnosis:	Pancreatic cyst  Assessment and plan of treatment:	Adrenal nodule and pancreatic cyst noted on imaging - Gastrointestinal medicine was consulted - No intervention was warranted - CT scan of the abdomen/pelvis was performed and it is recommended to repeat a CT scan of the abdomen as outpatient for further monitoring in 1 year - Refer to primary care provider for CT scan prescription.  Assessment and plan of treatment:	** Rheumatology was consulted and you were started on a steroid taper for a potential rheumatological ailment. It is recommended to continue your steroid taper as directed and follow-up with rheumatology as outpatient to discuss pending results. Call [phone] Principal Discharge DX:	Cellulitis of foot, left  Goal:	Monitor for improvement  Assessment and plan of treatment:	Infectious disease, podiatry, and vascular medicine were consulted - Antibiotics initiated and completed - It was recommended to undergo amputation surgery, however, the surgery was deferred at this time and you will attend rehab - It is strongly recommended to follow-up with vascular, podiatry, and wound care upon hospital discharge - Phone numbers are listed below.  Secondary Diagnosis:	DM (diabetes mellitus)  Assessment and plan of treatment:	Continue your medication regimen and a consistent carbohydrate diet (Meaning eating the same amount of carbohydrates at the same time each day). Monitor blood glucose levels throughout the day before meals and at bedtime. Record blood sugars and bring to outpatient providers appointment in order to be reviewed by your doctor for management modifications. Monitor for signs/symptoms of low blood glucose, such as, dizziness, altered mental status, or cool/clammy skin. In addition, monitor for signs/symptoms of high blood glucose, such as, feeling hot, dry, fatigued, or with increased thirst/urination. Make regular podiatry appointments in order to have feet checked for wounds and uncontrolled toe nail growth to prevent infections, as well as, appointments with an ophthalmologist to monitor your vision.  Secondary Diagnosis:	HTN (hypertension)  Assessment and plan of treatment:	Continue blood pressure medication regimen as directed. Monitor for any visual changes, headaches or dizziness.  Monitor blood pressure regularly.  Follow up with your PCP for further management for high blood pressure.  Secondary Diagnosis:	Microcytic anemia  Assessment and plan of treatment:	Follow-up with your outpatient provider for further care/recommendations - Will need repeat iron panel and alpha thalassemia levels within 1-3 months upon hospital discharge. Monitor for signs/symptoms indicating worsening of disease, such as, easy bleeding/bruising, pale skin, fatigue, dizziness, increased heart rate, or chest pain. Can call [facility] clinic for appointment [phone]  Secondary Diagnosis:	Pancreatic cyst  Assessment and plan of treatment:	Adrenal nodule and pancreatic cyst noted on imaging - Gastrointestinal medicine was consulted - No intervention was warranted - CT scan of the abdomen/pelvis was performed and it is recommended to repeat a CT scan of the abdomen as outpatient for further monitoring in 1 year - Refer to primary care provider for CT scan prescription.  Assessment and plan of treatment:	** Rheumatology was consulted and you were started on a steroid taper for a potential rheumatological ailment. It is recommended to continue your steroid taper as directed and follow-up with rheumatology as outpatient to discuss pending results. Call [phone] for appointment.   Prednisone taper as follows: 20 milligram(s) orally once a day for 2 more days (12/15 and 12/16) then take 15mg orally daily for 4 days then take 10mg orall daily for 4 days then take 5 mg orally daily for 4 days then stop

## 2018-11-29 NOTE — PROGRESS NOTE ADULT - SUBJECTIVE AND OBJECTIVE BOX
Pt is seen and examined  pt is lying in bed   no acute events overnight  off of abx      PAST MEDICAL & SURGICAL HISTORY:  DM (diabetes mellitus)  HTN (hypertension)  Breast CA  No significant past surgical history      ROS:  Negative except for:    MEDICATIONS  (STANDING):  cilostazol 100 milliGRAM(s) Oral two times a day  dextrose 5%. 1000 milliLiter(s) (50 mL/Hr) IV Continuous <Continuous>  dextrose 50% Injectable 12.5 Gram(s) IV Push once  gabapentin 200 milliGRAM(s) Oral three times a day  heparin  Infusion 1600 Unit(s)/Hr (16 mL/Hr) IV Continuous <Continuous>  insulin lispro (HumaLOG) corrective regimen sliding scale   SubCutaneous three times a day before meals  lidocaine   Patch 1 Patch Transdermal daily  lisinopril 10 milliGRAM(s) Oral daily  methylPREDNISolone sodium succinate Injectable 125 milliGRAM(s) IV Push daily    MEDICATIONS  (PRN):  acetaminophen   Tablet .. 975 milliGRAM(s) Oral every 6 hours PRN Mild Pain (1 - 3)  acetaminophen   Tablet .. 650 milliGRAM(s) Oral every 6 hours PRN Temp greater or equal to 38C (100.4F)  dextrose 40% Gel 15 Gram(s) Oral once PRN Blood Glucose LESS THAN 70 milliGRAM(s)/deciliter  glucagon  Injectable 1 milliGRAM(s) IntraMuscular once PRN Glucose LESS THAN 70 milligrams/deciliter  oxyCODONE    IR 10 milliGRAM(s) Oral every 6 hours PRN moderate and severe pain      Allergies    No Known Allergies    Intolerances        Vital Signs Last 24 Hrs  T(C): 36.3 (29 Nov 2018 06:02), Max: 37.8 (28 Nov 2018 13:39)  T(F): 97.4 (29 Nov 2018 06:02), Max: 100 (28 Nov 2018 13:39)  HR: 96 (29 Nov 2018 06:02) (96 - 116)  BP: 131/81 (29 Nov 2018 06:02) (124/71 - 132/75)  BP(mean): --  RR: 18 (29 Nov 2018 06:02) (18 - 18)  SpO2: 95% (29 Nov 2018 06:02) (94% - 99%)    PHYSICAL EXAM    Awake, alert  Anicteric, MMM  RRR S1,S2+  CTAB No ronchi  Abd soft, NT, ND, + BS  RLE without abnml. L foot dry gangrene        LABS:                          10.2   13.08 )-----------( 682      ( 29 Nov 2018 05:00 )             33.7     Serial CBC's  11-29 @ 05:00  Hct-33.7 / Hgb-10.2 / Plat-682 / RBC-4.26 / WBC-13.08          Serial CBC's  11-28 @ 06:22  Hct-28.4 / Hgb-8.8 / Plat-574 / RBC-3.60 / WBC-13.14            11-28    137  |  101  |  8   ----------------------------<  103<H>  4.2   |  23  |  0.83    Ca    10.0      28 Nov 2018 06:22        PTT - ( 29 Nov 2018 05:00 )  PTT:86.3 SEC    WBC Count: 13.08 K/uL (11-29 @ 05:00)  Hemoglobin: 10.2 g/dL (11-29 @ 05:00)            RADIOLOGY & ADDITIONAL STUDIES:

## 2018-11-29 NOTE — PROGRESS NOTE ADULT - ASSESSMENT
67 yo F w pmhx of T2DM, HTN presenting with 1 month of LLE swelling and discoloration admitted for  presumed cellulitis       seen by id and abs stopped  however NM bone scan ? osteo vs gangrene  monitor off abx per ID      ·  Problem: HTN (hypertension).  Recommendation: BP well controlled  c/w home med of lisinopril.       ·  Problem: DM (diabetes mellitus).  Recommendation: Blood glucose well controlled  C/w sliding scale insulin.       ·  Problem: Microcytic anemia.  Recommendation: likely KRIS, outpt f/u    ·  Problem: Ischemia of LLE - c/w Heparin gtt for now, await results of vasculitis/APLS w/u  Rheum and Heme appreciated, await vasculitis w/u results  Derm appreciated, no role of repeat skin bx at this time  MRA LLE  done results noted    2nd oppinion Rheum and Heme noted, w/u APLS still pending  Pletal started per Rheum  Steroids started after discussing case with Rheum, although no clear indication, can be tried at this time with minimal downside    Adrenal incidenteloma - MRI reviewed, appears to be adrenal adenoma, Surg f/u    likely plan for amputation of toes or foot vs BKA LLE after complete demarkation    ipmn - GI appreciated, f/u CT outpt in 1 year    case discussed w/daughter multiple times, yesterday and today over the phone, all questions answered  pt's daughter wants her mom transferred to a different hospital where it would be logistically easier for the family to visit, working on finding accepting physician 67 yo F w pmhx of T2DM, HTN presenting with 1 month of LLE swelling and discoloration admitted for  presumed cellulitis       seen by id and abs stopped  however NM bone scan ? osteo vs gangrene  monitor off abx per ID      ·  Problem: HTN (hypertension).  Recommendation: BP well controlled  c/w home med of lisinopril.       ·  Problem: DM (diabetes mellitus).  Recommendation: Blood glucose well controlled  C/w sliding scale insulin.       ·  Problem: Microcytic anemia.  Recommendation: likely KRIS, outpt f/u    ·  Problem: Ischemia of LLE - c/w Heparin gtt for now, await results of vasculitis/APLS w/u  Rheum and Heme appreciated, vasculitis/APLS w/u neg  ? stop AC at this time if no objection from Vascular  Derm appreciated, no role of repeat skin bx at this time  MRA LLE  done results noted    2nd oppinion Rheum and Heme noted, w/u APLS still pending  Pletal started per Rheum  Steroids started after discussing case with Rheum, although no clear indication, can be tried at this time with minimal downside    Adrenal incidenteloma - MRI reviewed, appears to be adrenal adenoma, Surg f/u    likely plan for amputation of toes or foot vs BKA LLE after complete demarkation    ipmn - GI appreciated, f/u CT outpt in 1 year    case discussed w/daughter multiple times, yesterday and today over the phone, all questions answered  pt's daughter wants her mom transferred to a different hospital where it would be logistically easier for the family to visit, working on finding accepting physician

## 2018-11-29 NOTE — DISCHARGE NOTE ADULT - CARE PROVIDERS DIRECT ADDRESSES
,DirectAddress_Unknown ,[email or web address],DirectAddress_Unknown,DirectAddress_Unknown,DirectAddress_Unknown

## 2018-11-29 NOTE — DISCHARGE NOTE ADULT - PATIENT PORTAL LINK FT
You can access the [facility] Patient Portal, offered by [facility], by registering with the following website: [email or web address]

## 2018-11-29 NOTE — PROGRESS NOTE ADULT - SUBJECTIVE AND OBJECTIVE BOX
Pain controlled.  L foot isch cont to demarcate. Dry, no gross infection.  +PT pulse.  Per Ms. Whipple- awaiting transfer to [facility].  ASsistance offered w transfer of info/records.  Please refer to my office/hosp notes for further vasc related info.

## 2018-11-30 LAB
APTT BLD: 107.8 SEC — HIGH (ref 27.5–36.3)
BUN SERPL-MCNC: 27 MG/DL — HIGH (ref 7–23)
CALCIUM SERPL-MCNC: 10.6 MG/DL — HIGH (ref 8.4–10.5)
CHLORIDE SERPL-SCNC: 101 MMOL/L — SIGNIFICANT CHANGE UP (ref 98–107)
CO2 SERPL-SCNC: 28 MMOL/L — SIGNIFICANT CHANGE UP (ref 22–31)
CREAT SERPL-MCNC: 0.98 MG/DL — SIGNIFICANT CHANGE UP (ref 0.5–1.3)
GLUCOSE BLDC GLUCOMTR-MCNC: 128 MG/DL — HIGH (ref 70–99)
GLUCOSE BLDC GLUCOMTR-MCNC: 167 MG/DL — HIGH (ref 70–99)
GLUCOSE BLDC GLUCOMTR-MCNC: 199 MG/DL — HIGH (ref 70–99)
GLUCOSE BLDC GLUCOMTR-MCNC: 288 MG/DL — HIGH (ref 70–99)
GLUCOSE SERPL-MCNC: 106 MG/DL — HIGH (ref 70–99)
HCT VFR BLD CALC: 27.4 % — LOW (ref 34.5–45)
HGB A MFR BLD: 96.8 % — SIGNIFICANT CHANGE UP
HGB A2 MFR BLD: 2.8 % — SIGNIFICANT CHANGE UP (ref 2.4–3.5)
HGB BLD-MCNC: 8.2 G/DL — LOW (ref 11.5–15.5)
HGB ELECT COMMENT: SIGNIFICANT CHANGE UP
HGB F MFR BLD: < 1 % — SIGNIFICANT CHANGE UP (ref 0–1.5)
MCHC RBC-ENTMCNC: 24.2 PG — LOW (ref 27–34)
MCHC RBC-ENTMCNC: 29.9 % — LOW (ref 32–36)
MCV RBC AUTO: 80.8 FL — SIGNIFICANT CHANGE UP (ref 80–100)
NRBC # FLD: 0 — SIGNIFICANT CHANGE UP
PLATELET # BLD AUTO: 636 K/UL — HIGH (ref 150–400)
PMV BLD: 9.3 FL — SIGNIFICANT CHANGE UP (ref 7–13)
POTASSIUM SERPL-MCNC: 4.4 MMOL/L — SIGNIFICANT CHANGE UP (ref 3.5–5.3)
POTASSIUM SERPL-SCNC: 4.4 MMOL/L — SIGNIFICANT CHANGE UP (ref 3.5–5.3)
RBC # BLD: 3.39 M/UL — LOW (ref 3.8–5.2)
RBC # FLD: 16.7 % — HIGH (ref 10.3–14.5)
SODIUM SERPL-SCNC: 139 MMOL/L — SIGNIFICANT CHANGE UP (ref 135–145)
WBC # BLD: 17.49 K/UL — HIGH (ref 3.8–10.5)
WBC # FLD AUTO: 17.49 K/UL — HIGH (ref 3.8–10.5)

## 2018-11-30 PROCEDURE — 99232 SBSQ HOSP IP/OBS MODERATE 35: CPT

## 2018-11-30 RX ORDER — HEPARIN SODIUM 5000 [USP'U]/ML
1500 INJECTION INTRAVENOUS; SUBCUTANEOUS
Qty: 25000 | Refills: 0 | Status: DISCONTINUED | OUTPATIENT
Start: 2018-11-30 | End: 2018-11-30

## 2018-11-30 RX ORDER — HEPARIN SODIUM 5000 [USP'U]/ML
15 INJECTION INTRAVENOUS; SUBCUTANEOUS
Qty: 25000 | Refills: 0 | Status: DISCONTINUED | OUTPATIENT
Start: 2018-11-30 | End: 2018-11-30

## 2018-11-30 RX ADMIN — Medication 3: at 12:27

## 2018-11-30 RX ADMIN — Medication 1: at 17:31

## 2018-11-30 RX ADMIN — Medication 125 MILLIGRAM(S): at 05:58

## 2018-11-30 RX ADMIN — LISINOPRIL 10 MILLIGRAM(S): 2.5 TABLET ORAL at 05:58

## 2018-11-30 RX ADMIN — CILOSTAZOL 100 MILLIGRAM(S): 100 TABLET ORAL at 17:31

## 2018-11-30 RX ADMIN — HEPARIN SODIUM 15 UNIT(S)/HR: 5000 INJECTION INTRAVENOUS; SUBCUTANEOUS at 10:17

## 2018-11-30 RX ADMIN — CILOSTAZOL 100 MILLIGRAM(S): 100 TABLET ORAL at 05:58

## 2018-11-30 RX ADMIN — GABAPENTIN 200 MILLIGRAM(S): 400 CAPSULE ORAL at 05:58

## 2018-11-30 RX ADMIN — GABAPENTIN 200 MILLIGRAM(S): 400 CAPSULE ORAL at 22:26

## 2018-11-30 NOTE — PROGRESS NOTE ADULT - ASSESSMENT
69 yo F w pmhx of T2DM, HTN presenting with 1 month of LLE swelling and discoloration admitted for  presumed cellulitis       seen by id and abs stopped  however NM bone scan ? osteo vs gangrene  monitor off abx per ID  uptrending WBC likely 2/2 steroids      ·  Problem: HTN (hypertension).  Recommendation: BP well controlled  c/w home med of lisinopril.       ·  Problem: DM (diabetes mellitus).  Recommendation: Blood glucose well controlled  C/w sliding scale insulin.       ·  Problem: Microcytic anemia.  Recommendation: likely KRIS, outpt f/u    ·  Problem: Ischemia of LLE - c/w Heparin gtt for now, await results of vasculitis/APLS w/u  Rheum and Heme appreciated, vasculitis/APLS w/u neg  ? stop AC at this time if no objection from Vascular  Derm appreciated, no role of repeat skin bx at this time  MRA LLE  done results noted    2nd oppinion Rheum and Heme noted, w/u APLS still pending  Pletal started per Rheum  Steroids started after discussing case with Rheum, although no clear indication, can be tried at this time with minimal downside    Adrenal incidenteloma - MRI reviewed, appears to be adrenal adenoma, Surg f/u    likely plan for amputation of toes or foot vs BKA LLE after complete demarkation    ipmn - GI appreciated, f/u CT outpt in 1 year    records sent to [facility], as daughter wishes pt to transfer there  will f/u w/transfer center 69 yo F w pmhx of T2DM, HTN presenting with 1 month of LLE swelling and discoloration admitted for  presumed cellulitis       seen by id and abs stopped  however NM bone scan ? osteo vs gangrene  monitor off abx per ID  uptrending WBC likely 2/2 steroids      ·  Problem: HTN (hypertension).  Recommendation: BP well controlled  c/w home med of lisinopril.       ·  Problem: DM (diabetes mellitus).  Recommendation: Blood glucose well controlled  C/w sliding scale insulin.       ·  Problem: Microcytic anemia.  Recommendation: likely KRIS, outpt f/u    ·  Problem: Ischemia of LLE - off Heparin gtt  Rheum and Heme appreciated, vasculitis/APLS w/u neg  ? stop AC at this time if no objection from Vascular  Derm appreciated, no role of repeat skin bx at this time  MRA LLE  done results noted    2nd oppinion Rheum and Heme noted, w/u APLS still pending  Pletal started per Rheum  Steroids started after discussing case with Rheum, although no clear indication, can be tried at this time with minimal downside    Adrenal incidenteloma - MRI reviewed, appears to be adrenal adenoma, Surg f/u    likely plan for amputation of toes or foot vs BKA LLE after complete demarkation    ipmn - GI appreciated, f/u CT outpt in 1 year    Hb drop noted, off AC, recheck CBC in PM, FOBT    records sent to [facility], as daughter wishes pt to transfer there  will f/u w/transfer center

## 2018-11-30 NOTE — CHART NOTE - NSCHARTNOTEFT_GEN_A_CORE
As per daughter, will want to transfer to Vascular surgery at [facility] with Dr. Malik service. Called office at [phone], medical records received, awaiting review from Dr. Malik. Will follow.     ADS  [id number]

## 2018-11-30 NOTE — PROGRESS NOTE ADULT - ASSESSMENT
1. 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 Low suspicion for APLS as her  b2 glycoprotein Abs neg and anticardiolipin IgG and IgM neg  -- from heme perspective no need to cont Heparin gtt  -- f/u vascular, ID, and rheum  -- off of Abx per ID  -- short course steroids per rheum  -- WBC trending up. ? plan for amputation    2. anemia likely anemia of chronic inflammation +/- KRIS    -- would need to repeat Fe panel and check soluble transferrin receptor and alpha thal as outpt  -- counts adequate for now  -- No B12/Folate Def  -- No Hemolysis  -- No monoclonal gammopathy on SPEP or TANNER    3. thrombocytosis     -- likely reactive, slowly trending up, monitor for now  -- No indication for cytoreduction    4. Side branch IPMN     -- GI Input appreciated  -- ca19-9 not elevated  -- outpt follow up    Olga Gerardo MD  [phone]

## 2018-11-30 NOTE — PROGRESS NOTE ADULT - SUBJECTIVE AND OBJECTIVE BOX
Chief complaint: L foot ischemia  Interval hx: no acute events    PAST MEDICAL & SURGICAL HISTORY:  DM (diabetes mellitus)  HTN (hypertension)  Breast CA  No significant past surgical history          REVIEW OF SYSTEMS:  CONSTITUTIONAL: No change in symptoms  EYES: No eye pain, visual disturbances, or discharge  NECK: No pain or stiffness  RESPIRATORY: No cough, wheezing, chills or hemoptysis; No Shortness of Breath  CARDIOVASCULAR: No chest pain, palpitations, passing out, dizziness, or leg swelling  GASTROINTESTINAL: No abdominal or epigastric pain. No nausea, vomiting, or hematemesis; No diarrhea or constipation. No melena or hematochezia.  GENITOURINARY: No dysuria, frequency, hematuria, or incontinence  NEUROLOGICAL: No headaches,   l foot pain no change      Medications:  MEDICATIONS  (STANDING):  cilostazol 100 milliGRAM(s) Oral two times a day  dextrose 5%. 1000 milliLiter(s) (50 mL/Hr) IV Continuous <Continuous>  dextrose 50% Injectable 12.5 Gram(s) IV Push once  gabapentin 200 milliGRAM(s) Oral three times a day  heparin  Infusion 1500 Unit(s)/Hr (15 mL/Hr) IV Continuous <Continuous>  insulin lispro (HumaLOG) corrective regimen sliding scale   SubCutaneous three times a day before meals  lidocaine   Patch 1 Patch Transdermal daily  lisinopril 10 milliGRAM(s) Oral daily  methylPREDNISolone sodium succinate Injectable 125 milliGRAM(s) IV Push daily    MEDICATIONS  (PRN):  acetaminophen   Tablet .. 975 milliGRAM(s) Oral every 6 hours PRN Mild Pain (1 - 3)  acetaminophen   Tablet .. 650 milliGRAM(s) Oral every 6 hours PRN Temp greater or equal to 38C (100.4F)  dextrose 40% Gel 15 Gram(s) Oral once PRN Blood Glucose LESS THAN 70 milliGRAM(s)/deciliter  glucagon  Injectable 1 milliGRAM(s) IntraMuscular once PRN Glucose LESS THAN 70 milligrams/deciliter  oxyCODONE    IR 10 milliGRAM(s) Oral every 6 hours PRN Severe Pain (7 - 10)    	    PHYSICAL EXAM:  T(C): 36.6 (11-30-18 @ 05:55), Max: 36.9 (11-29-18 @ 13:14)  HR: 92 (11-30-18 @ 05:55) (92 - 110)  BP: 116/74 (11-30-18 @ 05:55) (102/67 - 116/74)  RR: 18 (11-30-18 @ 05:55) (17 - 18)  SpO2: 97% (11-30-18 @ 05:55) (97% - 100%)  Wt(kg): --  I&O's Summary    Appearance: Normal	  HEENT:   Normal oral mucosa, PERRL, EOMI	  Lymphatic: No lymphadenopathy  Cardiovascular: Normal S1 S2, No JVD, No murmurs, No edema  Respiratory: Lungs clear to auscultation	  Psychiatry: A & O x 3, Mood & affect appropriate  Gastrointestinal:  Soft, Non-tender, + BS	  Skin: No rashes, No ecchymoses, No cyanosis	  Neurologic: Non-focal  Extremities:left foot vasc / ischemic changes demarcation continues    LABS:	 	    CARDIAC MARKERS:                                8.2    17.49 )-----------( 636      ( 30 Nov 2018 06:43 )             27.4     11-30    139  |  101  |  27<H>  ----------------------------<  106<H>  4.4   |  28  |  0.98    Ca    10.6<H>      30 Nov 2018 06:43      proBNP:   Lipid Profile:   HgA1c:   TSH:

## 2018-11-30 NOTE — PROGRESS NOTE ADULT - SUBJECTIVE AND OBJECTIVE BOX
Follow Up:  ischemic foot    Interval History/ROS: about the same    Allergies  No Known Allergies    ANTIMICROBIALS:      OTHER MEDS:  MEDICATIONS  (STANDING):  acetaminophen   Tablet .. 975 every 6 hours PRN  acetaminophen   Tablet .. 650 every 6 hours PRN  cilostazol 100 two times a day  dextrose 40% Gel 15 once PRN  dextrose 50% Injectable 12.5 once  gabapentin 200 three times a day  glucagon  Injectable 1 once PRN  insulin lispro (HumaLOG) corrective regimen sliding scale  three times a day before meals  lisinopril 10 daily  methylPREDNISolone sodium succinate Injectable 125 daily  oxyCODONE    IR 10 every 6 hours PRN      Vital Signs Last 24 Hrs  T(C): 36.8 (30 Nov 2018 16:00), Max: 36.8 (30 Nov 2018 16:00)  T(F): 98.2 (30 Nov 2018 16:00), Max: 98.2 (30 Nov 2018 16:00)  HR: 94 (30 Nov 2018 16:00) (92 - 109)  BP: 112/76 (30 Nov 2018 16:00) (110/56 - 116/74)  BP(mean): --  RR: 19 (30 Nov 2018 16:00) (18 - 19)  SpO2: 99% (30 Nov 2018 16:00) (97% - 100%)    PHYSICAL EXAM:  General: WN/WD NAD, Non-toxic  Neurology: A&Ox3, nonfocal  Respiratory: Clear to auscultation bilaterally  CV: RRR, S1S2, no murmurs, rubs or gallops  Abdominal: Soft, Non-tender, non-distended, normal bowel sounds  Extremities: cool discolored foot - the tips of toes on left foot appeared more mummified - advanced discoloration distal foot  Line Sites: Clear  Skin: No rash                        8.2    17.49 )-----------( 636      ( 30 Nov 2018 06:43 )             27.4  WBC Count: 17.49 (11-30 @ 06:43)  WBC Count: 13.08 (11-29 @ 05:00)  WBC Count: 13.14 (11-28 @ 06:22)  WBC Count: 12.51 (11-27 @ 05:35)  WBC Count: 14.56 (11-26 @ 14:50)  WBC Count: 14.28 (11-26 @ 05:47)    11-30    139  |  101  |  27<H>  ----------------------------<  106<H>  4.4   |  28  |  0.98    Ca    10.6<H>      30 Nov 2018 06:43      MICROBIOLOGY:  THROAT  11-19-18 --  --  --      BLOOD PERIPHERAL  11-18-18 --  --  --      BLOOD  11-14-18 --  --  --        RADIOLOGY:    Dayne Briseno MD; Division of Infectious Disease; Pager: [phone]; nights and weekends: [phone]

## 2018-11-30 NOTE — PROGRESS NOTE ADULT - SUBJECTIVE AND OBJECTIVE BOX
RENATA DIXON:[id number],   68yFemale followed for:  No Known Allergies    PAST MEDICAL & SURGICAL HISTORY:  DM (diabetes mellitus)  HTN (hypertension)  Breast CA  No significant past surgical history    FAMILY HISTORY:  No pertinent family history in first degree relatives    MEDICATIONS  (STANDING):  cilostazol 100 milliGRAM(s) Oral two times a day  dextrose 5%. 1000 milliLiter(s) (50 mL/Hr) IV Continuous <Continuous>  dextrose 50% Injectable 12.5 Gram(s) IV Push once  gabapentin 200 milliGRAM(s) Oral three times a day  heparin  Infusion 1600 Unit(s)/Hr (16 mL/Hr) IV Continuous <Continuous>  insulin lispro (HumaLOG) corrective regimen sliding scale   SubCutaneous three times a day before meals  lidocaine   Patch 1 Patch Transdermal daily  lisinopril 10 milliGRAM(s) Oral daily  methylPREDNISolone sodium succinate Injectable 125 milliGRAM(s) IV Push daily    MEDICATIONS  (PRN):  acetaminophen   Tablet .. 975 milliGRAM(s) Oral every 6 hours PRN Mild Pain (1 - 3)  acetaminophen   Tablet .. 650 milliGRAM(s) Oral every 6 hours PRN Temp greater or equal to 38C (100.4F)  dextrose 40% Gel 15 Gram(s) Oral once PRN Blood Glucose LESS THAN 70 milliGRAM(s)/deciliter  glucagon  Injectable 1 milliGRAM(s) IntraMuscular once PRN Glucose LESS THAN 70 milligrams/deciliter  oxyCODONE    IR 10 milliGRAM(s) Oral every 6 hours PRN Severe Pain (7 - 10)      Vital Signs Last 24 Hrs  T(C): 36.6 (30 Nov 2018 05:55), Max: 36.9 (29 Nov 2018 13:14)  T(F): 97.8 (30 Nov 2018 05:55), Max: 98.4 (29 Nov 2018 13:14)  HR: 92 (30 Nov 2018 05:55) (92 - 110)  BP: 116/74 (30 Nov 2018 05:55) (102/67 - 116/74)  BP(mean): --  RR: 18 (30 Nov 2018 05:55) (17 - 18)  SpO2: 97% (30 Nov 2018 05:55) (97% - 100%)  nc/at  s1s2  cta  soft, nt, nd no guarding or rebound  no c/c/e    CBC Full  -  ( 30 Nov 2018 06:43 )  WBC Count : 17.49 K/uL  Hemoglobin : 8.2 g/dL  Hematocrit : 27.4 %  Platelet Count - Automated : 636 K/uL  Mean Cell Volume : 80.8 fL  Mean Cell Hemoglobin : 24.2 pg  Mean Cell Hemoglobin Concentration : 29.9 %  Auto Neutrophil # : x  Auto Lymphocyte # : x  Auto Monocyte # : x  Auto Eosinophil # : x  Auto Basophil # : x  Auto Neutrophil % : x  Auto Lymphocyte % : x  Auto Monocyte % : x  Auto Eosinophil % : x  Auto Basophil % : x    11-30    139  |  101  |  27<H>  ----------------------------<  106<H>  4.4   |  28  |  0.98    Ca    10.6<H>      30 Nov 2018 06:43      PTT - ( 30 Nov 2018 06:43 )  PTT:107.8 SEC

## 2018-11-30 NOTE — PROGRESS NOTE ADULT - SUBJECTIVE AND OBJECTIVE BOX
Pt is seen and examined  pt is awake and lying in bed   Agitated, does not feel like talking, feels that she is not any better since admission  requesting transfer      PAST MEDICAL & SURGICAL HISTORY:  DM (diabetes mellitus)  HTN (hypertension)  Breast CA  No significant past surgical history      ROS:  Negative except for: as above    MEDICATIONS  (STANDING):  cilostazol 100 milliGRAM(s) Oral two times a day  dextrose 5%. 1000 milliLiter(s) (50 mL/Hr) IV Continuous <Continuous>  dextrose 50% Injectable 12.5 Gram(s) IV Push once  gabapentin 200 milliGRAM(s) Oral three times a day  heparin  Infusion 1600 Unit(s)/Hr (16 mL/Hr) IV Continuous <Continuous>  insulin lispro (HumaLOG) corrective regimen sliding scale   SubCutaneous three times a day before meals  lidocaine   Patch 1 Patch Transdermal daily  lisinopril 10 milliGRAM(s) Oral daily  methylPREDNISolone sodium succinate Injectable 125 milliGRAM(s) IV Push daily    MEDICATIONS  (PRN):  acetaminophen   Tablet .. 975 milliGRAM(s) Oral every 6 hours PRN Mild Pain (1 - 3)  acetaminophen   Tablet .. 650 milliGRAM(s) Oral every 6 hours PRN Temp greater or equal to 38C (100.4F)  dextrose 40% Gel 15 Gram(s) Oral once PRN Blood Glucose LESS THAN 70 milliGRAM(s)/deciliter  glucagon  Injectable 1 milliGRAM(s) IntraMuscular once PRN Glucose LESS THAN 70 milligrams/deciliter  oxyCODONE    IR 10 milliGRAM(s) Oral every 6 hours PRN Severe Pain (7 - 10)      Allergies    No Known Allergies    Intolerances        Vital Signs Last 24 Hrs  T(C): 36.6 (30 Nov 2018 05:55), Max: 36.9 (29 Nov 2018 13:14)  T(F): 97.8 (30 Nov 2018 05:55), Max: 98.4 (29 Nov 2018 13:14)  HR: 92 (30 Nov 2018 05:55) (92 - 110)  BP: 116/74 (30 Nov 2018 05:55) (102/67 - 116/74)  BP(mean): --  RR: 18 (30 Nov 2018 05:55) (17 - 18)  SpO2: 97% (30 Nov 2018 05:55) (97% - 100%)    PHYSICAL EXAM    Awake, alert  Anicteric, MMM  RRR S1,S2+  CTAB No chance  Abd soft, NT, ND, + BS  RLE without abnml. L foot dry gangrene    LABS:                          8.2    17.49 )-----------( 636      ( 30 Nov 2018 06:43 )             27.4     Serial CBC's  11-30 @ 06:43  Hct-27.4 / Hgb-8.2 / Plat-636 / RBC-3.39 / WBC-17.49          Serial CBC's  11-29 @ 05:00  Hct-33.7 / Hgb-10.2 / Plat-682 / RBC-4.26 / WBC-13.08            11-30    139  |  101  |  27<H>  ----------------------------<  106<H>  4.4   |  28  |  0.98    Ca    10.6<H>      30 Nov 2018 06:43        PTT - ( 30 Nov 2018 06:43 )  PTT:107.8 SEC    WBC Count: 17.49 K/uL (11-30 @ 06:43)  Hemoglobin: 8.2 g/dL (11-30 @ 06:43)            RADIOLOGY & ADDITIONAL STUDIES:

## 2018-11-30 NOTE — PROGRESS NOTE ADULT - ASSESSMENT
68F with DM, HTN, weight loss, admitted on 11/14/18 with left foot pain of several months duration  Left foot pain/ fever/ leukocytosis  Treated empirically for cellulitis with 7 day course of abx through 7/21  Seen by rheumatology.  Pathology with "endarteritis obliterans". Rheumatology DDx is  Buergers thromboangiitis obliterans or livedoid vasculopathy . This patient has been smoking since age 19. She states she has quit smoking since just prior to current admission.  Anti-phospholipid serologies are negative  On therapeutic steroid trial: Solumedrol 125 daily 11/28-->    Ischemic foot with dry gangrene   increased leukocytosis secondary to tissue damage + steroids    Suggest:  Continue to observe off abx.  I am hoping that steroid duration can be minimized unless clear therapeutic response    Please call ID if needed over weekend  I will be at different hospital next week: ID service will follow unless patient transfers out

## 2018-12-01 LAB
GLUCOSE BLDC GLUCOMTR-MCNC: 150 MG/DL — HIGH (ref 70–99)
GLUCOSE BLDC GLUCOMTR-MCNC: 159 MG/DL — HIGH (ref 70–99)
GLUCOSE BLDC GLUCOMTR-MCNC: 196 MG/DL — HIGH (ref 70–99)

## 2018-12-01 RX ADMIN — GABAPENTIN 200 MILLIGRAM(S): 400 CAPSULE ORAL at 21:18

## 2018-12-01 RX ADMIN — GABAPENTIN 200 MILLIGRAM(S): 400 CAPSULE ORAL at 05:15

## 2018-12-01 RX ADMIN — LISINOPRIL 10 MILLIGRAM(S): 2.5 TABLET ORAL at 05:15

## 2018-12-01 RX ADMIN — CILOSTAZOL 100 MILLIGRAM(S): 100 TABLET ORAL at 17:40

## 2018-12-01 RX ADMIN — Medication 125 MILLIGRAM(S): at 05:15

## 2018-12-01 RX ADMIN — CILOSTAZOL 100 MILLIGRAM(S): 100 TABLET ORAL at 09:55

## 2018-12-01 RX ADMIN — Medication 1: at 12:34

## 2018-12-01 RX ADMIN — Medication 1: at 17:40

## 2018-12-01 NOTE — PROGRESS NOTE ADULT - SUBJECTIVE AND OBJECTIVE BOX
CHIEF COMPLAINT:Patient is a 68y old  Female who presents with a chief complaint of Left foot cellulitis (30 Nov 2018 18:04)    	        PAST MEDICAL & SURGICAL HISTORY:  DM (diabetes mellitus)  HTN (hypertension)  Breast CA  No significant past surgical history          REVIEW OF SYSTEMS:  CONSTITUTIONAL: No fever, weight loss, or fatigue  EYES: No eye pain, visual disturbances, or discharge  NECK: No pain or stiffness  RESPIRATORY: No cough, wheezing, chills or hemoptysis; No Shortness of Breath  CARDIOVASCULAR: No chest pain, palpitations, passing out, dizziness, or leg swelling  GASTROINTESTINAL: No abdominal or epigastric pain. No nausea, vomiting, or hematemesis; No diarrhea or constipation. No melena or hematochezia.  GENITOURINARY: No dysuria, frequency, hematuria, or incontinence  NEUROLOGICAL: No headaches, memory loss, loss of strength, numbness, or tremors  l foot pain controlled    Medications:  MEDICATIONS  (STANDING):  cilostazol 100 milliGRAM(s) Oral two times a day  dextrose 5%. 1000 milliLiter(s) (50 mL/Hr) IV Continuous <Continuous>  dextrose 50% Injectable 12.5 Gram(s) IV Push once  gabapentin 200 milliGRAM(s) Oral three times a day  insulin lispro (HumaLOG) corrective regimen sliding scale   SubCutaneous three times a day before meals  lidocaine   Patch 1 Patch Transdermal daily  lisinopril 10 milliGRAM(s) Oral daily  methylPREDNISolone sodium succinate Injectable 125 milliGRAM(s) IV Push daily    MEDICATIONS  (PRN):  acetaminophen   Tablet .. 975 milliGRAM(s) Oral every 6 hours PRN Mild Pain (1 - 3)  acetaminophen   Tablet .. 650 milliGRAM(s) Oral every 6 hours PRN Temp greater or equal to 38C (100.4F)  dextrose 40% Gel 15 Gram(s) Oral once PRN Blood Glucose LESS THAN 70 milliGRAM(s)/deciliter  glucagon  Injectable 1 milliGRAM(s) IntraMuscular once PRN Glucose LESS THAN 70 milligrams/deciliter  oxyCODONE    IR 10 milliGRAM(s) Oral every 6 hours PRN Severe Pain (7 - 10)    	    PHYSICAL EXAM:  T(C): 36.8 (12-01-18 @ 05:11), Max: 36.8 (11-30-18 @ 16:00)  HR: 91 (12-01-18 @ 05:11) (83 - 94)  BP: 133/79 (12-01-18 @ 05:11) (108/67 - 133/79)  RR: 18 (12-01-18 @ 05:11) (18 - 20)  SpO2: 97% (12-01-18 @ 05:11) (95% - 99%)  Wt(kg): --  I&O's Summary      Appearance: Normal	  HEENT:   Normal oral mucosa, PERRL, EOMI	  Lymphatic: No lymphadenopathy  Cardiovascular: Normal S1 S2, No JVD, No murmurs,   Respiratory: Lungs clear to auscultation	  Psychiatry: A & O x 3,  Gastrointestinal:  Soft, Non-tender, + BS	  Neurologic: Non-focal  Extremities: l foot dicolored/ischemic changes      TELEMETRY: 	    ECG:  	  RADIOLOGY:  OTHER: 	  	  LABS:	 	    CARDIAC MARKERS:                                8.2    17.49 )-----------( 636      ( 30 Nov 2018 06:43 )             27.4     11-30    139  |  101  |  27<H>  ----------------------------<  106<H>  4.4   |  28  |  0.98    Ca    10.6<H>      30 Nov 2018 06:43      proBNP:   Lipid Profile:   HgA1c:   TSH:

## 2018-12-01 NOTE — PROGRESS NOTE ADULT - ASSESSMENT
69 yo F w pmhx of T2DM, HTN presenting with 1 month of LLE swelling and discoloration admitted for  presumed cellulitis       seen by id and abs stopped  monitor off abx per ID as per id  uptrending WBC likely 2/2 steroids      ·  Problem: HTN (hypertension).   : BP well controlled  c/w home med of lisinopril.       ·  Problem: DM (diabetes mellitus).    : Blood glucose well controlled  C/w sliding scale insulin.       ·  Problem: Microcytic anemia.    likely KRIS,   heme f/u noted    ·  Problem: Ischemia of LLE - off Heparin gtt  Rheum and Heme appreciated, vasculitis/APLS w/u neg  stopped a/c as per/vasc/rheum  Derm appreciated, no role of repeat skin bx at this time  MRA LLE  done results noted    2nd opinion Rheum and Heme noted,and following  cont pletal  Steroids started after discussing case with Rheum, although no clear indication, can be tried at this time with minimal downside    Adrenal incidenteloma - MRI reviewed, appears to be adrenal adenoma, Surg f/u      likely plan for amputation of toes or foot vs BKA LLE after complete demarcation    ipmn - GI appreciated, f/u CT outpt in 1 year    records sent to [facility], as daughter wishes pt to transfer there

## 2018-12-01 NOTE — CHART NOTE - NSCHARTNOTEFT_GEN_A_CORE
Spoke with transfer center at [phone] for possible transfer to [facility]. Spoke with medical team at [facility] and cased reviewed. As per medical team, no indication for transfer given services offered at [facility] can be offered at [facility].     Called Dr. Malik office at [phone] regarding review of documentation sent Thursday for possible transfer to [facility]. As per  conversation with Dr. Malik, will need to await Monday for Dr. Malik to return to office to review information for definitive answer.     Daughter and pt informed and updated regarding information for transfer. Will follow.     Of note, spoke with Vascular, no acute surgical intervention indicated until L foot ischemia demarcated. Spoke with covering physician, Dr. Mick Dominguez (rheumatologist) at [phone] for Dr. Mendez, will continue with steroids and await further assessment.     ADS  [id number]

## 2018-12-01 NOTE — PROGRESS NOTE ADULT - SUBJECTIVE AND OBJECTIVE BOX
RENATA DIXON:[id number],   68yFemale followed for:  No Known Allergies    PAST MEDICAL & SURGICAL HISTORY:  DM (diabetes mellitus)  HTN (hypertension)  Breast CA  No significant past surgical history    FAMILY HISTORY:  No pertinent family history in first degree relatives    MEDICATIONS  (STANDING):  cilostazol 100 milliGRAM(s) Oral two times a day  dextrose 5%. 1000 milliLiter(s) (50 mL/Hr) IV Continuous <Continuous>  dextrose 50% Injectable 12.5 Gram(s) IV Push once  gabapentin 200 milliGRAM(s) Oral three times a day  insulin lispro (HumaLOG) corrective regimen sliding scale   SubCutaneous three times a day before meals  lidocaine   Patch 1 Patch Transdermal daily  lisinopril 10 milliGRAM(s) Oral daily  methylPREDNISolone sodium succinate Injectable 125 milliGRAM(s) IV Push daily    MEDICATIONS  (PRN):  acetaminophen   Tablet .. 975 milliGRAM(s) Oral every 6 hours PRN Mild Pain (1 - 3)  acetaminophen   Tablet .. 650 milliGRAM(s) Oral every 6 hours PRN Temp greater or equal to 38C (100.4F)  dextrose 40% Gel 15 Gram(s) Oral once PRN Blood Glucose LESS THAN 70 milliGRAM(s)/deciliter  glucagon  Injectable 1 milliGRAM(s) IntraMuscular once PRN Glucose LESS THAN 70 milligrams/deciliter  oxyCODONE    IR 10 milliGRAM(s) Oral every 6 hours PRN Severe Pain (7 - 10)      Vital Signs Last 24 Hrs  T(C): 36.8 (01 Dec 2018 05:11), Max: 36.8 (30 Nov 2018 16:00)  T(F): 98.2 (01 Dec 2018 05:11), Max: 98.2 (30 Nov 2018 16:00)  HR: 91 (01 Dec 2018 05:11) (83 - 94)  BP: 133/79 (01 Dec 2018 05:11) (108/67 - 133/79)  BP(mean): --  RR: 18 (01 Dec 2018 05:11) (18 - 20)  SpO2: 97% (01 Dec 2018 05:11) (95% - 99%)  nc/at  s1s2  cta  soft, nt, nd no guarding or rebound  no c/c/e    CBC Full  -  ( 30 Nov 2018 06:43 )  WBC Count : 17.49 K/uL  Hemoglobin : 8.2 g/dL  Hematocrit : 27.4 %  Platelet Count - Automated : 636 K/uL  Mean Cell Volume : 80.8 fL  Mean Cell Hemoglobin : 24.2 pg  Mean Cell Hemoglobin Concentration : 29.9 %  Auto Neutrophil # : x  Auto Lymphocyte # : x  Auto Monocyte # : x  Auto Eosinophil # : x  Auto Basophil # : x  Auto Neutrophil % : x  Auto Lymphocyte % : x  Auto Monocyte % : x  Auto Eosinophil % : x  Auto Basophil % : x    11-30    139  |  101  |  27<H>  ----------------------------<  106<H>  4.4   |  28  |  0.98    Ca    10.6<H>      30 Nov 2018 06:43      PTT - ( 30 Nov 2018 06:43 )  PTT:107.8 SEC

## 2018-12-02 LAB
ALBUMIN SERPL ELPH-MCNC: 3.1 G/DL — LOW (ref 3.3–5)
ALP SERPL-CCNC: 60 U/L — SIGNIFICANT CHANGE UP (ref 40–120)
ALT FLD-CCNC: 6 U/L — SIGNIFICANT CHANGE UP (ref 4–33)
AST SERPL-CCNC: 7 U/L — SIGNIFICANT CHANGE UP (ref 4–32)
BILIRUB SERPL-MCNC: < 0.2 MG/DL — LOW (ref 0.2–1.2)
BUN SERPL-MCNC: 28 MG/DL — HIGH (ref 7–23)
BUN SERPL-MCNC: 28 MG/DL — HIGH (ref 7–23)
CALCIUM SERPL-MCNC: 10.5 MG/DL — SIGNIFICANT CHANGE UP (ref 8.4–10.5)
CALCIUM SERPL-MCNC: 10.5 MG/DL — SIGNIFICANT CHANGE UP (ref 8.4–10.5)
CHLORIDE SERPL-SCNC: 99 MMOL/L — SIGNIFICANT CHANGE UP (ref 98–107)
CHLORIDE SERPL-SCNC: 99 MMOL/L — SIGNIFICANT CHANGE UP (ref 98–107)
CO2 SERPL-SCNC: 28 MMOL/L — SIGNIFICANT CHANGE UP (ref 22–31)
CO2 SERPL-SCNC: 28 MMOL/L — SIGNIFICANT CHANGE UP (ref 22–31)
CREAT SERPL-MCNC: 0.84 MG/DL — SIGNIFICANT CHANGE UP (ref 0.5–1.3)
CREAT SERPL-MCNC: 0.84 MG/DL — SIGNIFICANT CHANGE UP (ref 0.5–1.3)
GLUCOSE BLDC GLUCOMTR-MCNC: 135 MG/DL — HIGH (ref 70–99)
GLUCOSE BLDC GLUCOMTR-MCNC: 153 MG/DL — HIGH (ref 70–99)
GLUCOSE BLDC GLUCOMTR-MCNC: 157 MG/DL — HIGH (ref 70–99)
GLUCOSE BLDC GLUCOMTR-MCNC: 198 MG/DL — HIGH (ref 70–99)
GLUCOSE SERPL-MCNC: 107 MG/DL — HIGH (ref 70–99)
GLUCOSE SERPL-MCNC: 107 MG/DL — HIGH (ref 70–99)
HCT VFR BLD CALC: 28.9 % — LOW (ref 34.5–45)
HGB BLD-MCNC: 9 G/DL — LOW (ref 11.5–15.5)
MCHC RBC-ENTMCNC: 24.8 PG — LOW (ref 27–34)
MCHC RBC-ENTMCNC: 31.1 % — LOW (ref 32–36)
MCV RBC AUTO: 79.6 FL — LOW (ref 80–100)
NRBC # FLD: 0 — SIGNIFICANT CHANGE UP
PLATELET # BLD AUTO: 643 K/UL — HIGH (ref 150–400)
PMV BLD: 8.8 FL — SIGNIFICANT CHANGE UP (ref 7–13)
POTASSIUM SERPL-MCNC: 4 MMOL/L — SIGNIFICANT CHANGE UP (ref 3.5–5.3)
POTASSIUM SERPL-MCNC: 4 MMOL/L — SIGNIFICANT CHANGE UP (ref 3.5–5.3)
POTASSIUM SERPL-SCNC: 4 MMOL/L — SIGNIFICANT CHANGE UP (ref 3.5–5.3)
POTASSIUM SERPL-SCNC: 4 MMOL/L — SIGNIFICANT CHANGE UP (ref 3.5–5.3)
PROT SERPL-MCNC: 6.7 G/DL — SIGNIFICANT CHANGE UP (ref 6–8.3)
RBC # BLD: 3.63 M/UL — LOW (ref 3.8–5.2)
RBC # FLD: 17.1 % — HIGH (ref 10.3–14.5)
SODIUM SERPL-SCNC: 138 MMOL/L — SIGNIFICANT CHANGE UP (ref 135–145)
SODIUM SERPL-SCNC: 138 MMOL/L — SIGNIFICANT CHANGE UP (ref 135–145)
WBC # BLD: 10.19 K/UL — SIGNIFICANT CHANGE UP (ref 3.8–10.5)
WBC # FLD AUTO: 10.19 K/UL — SIGNIFICANT CHANGE UP (ref 3.8–10.5)

## 2018-12-02 RX ADMIN — CILOSTAZOL 100 MILLIGRAM(S): 100 TABLET ORAL at 05:17

## 2018-12-02 RX ADMIN — GABAPENTIN 200 MILLIGRAM(S): 400 CAPSULE ORAL at 14:11

## 2018-12-02 RX ADMIN — Medication 125 MILLIGRAM(S): at 05:17

## 2018-12-02 RX ADMIN — GABAPENTIN 200 MILLIGRAM(S): 400 CAPSULE ORAL at 05:17

## 2018-12-02 RX ADMIN — GABAPENTIN 200 MILLIGRAM(S): 400 CAPSULE ORAL at 21:09

## 2018-12-02 RX ADMIN — LISINOPRIL 10 MILLIGRAM(S): 2.5 TABLET ORAL at 05:17

## 2018-12-02 RX ADMIN — Medication 1: at 12:23

## 2018-12-02 RX ADMIN — Medication 1: at 17:27

## 2018-12-02 RX ADMIN — CILOSTAZOL 100 MILLIGRAM(S): 100 TABLET ORAL at 17:27

## 2018-12-02 NOTE — PROGRESS NOTE ADULT - SUBJECTIVE AND OBJECTIVE BOX
RENATA DIXON:[id number],   68yFemale followed for:  No Known Allergies    PAST MEDICAL & SURGICAL HISTORY:  DM (diabetes mellitus)  HTN (hypertension)  Breast CA  No significant past surgical history    FAMILY HISTORY:  No pertinent family history in first degree relatives    MEDICATIONS  (STANDING):  cilostazol 100 milliGRAM(s) Oral two times a day  dextrose 5%. 1000 milliLiter(s) (50 mL/Hr) IV Continuous <Continuous>  dextrose 50% Injectable 12.5 Gram(s) IV Push once  gabapentin 200 milliGRAM(s) Oral three times a day  insulin lispro (HumaLOG) corrective regimen sliding scale   SubCutaneous three times a day before meals  lidocaine   Patch 1 Patch Transdermal daily  lisinopril 10 milliGRAM(s) Oral daily  methylPREDNISolone sodium succinate Injectable 125 milliGRAM(s) IV Push daily    MEDICATIONS  (PRN):  acetaminophen   Tablet .. 975 milliGRAM(s) Oral every 6 hours PRN Mild Pain (1 - 3)  acetaminophen   Tablet .. 650 milliGRAM(s) Oral every 6 hours PRN Temp greater or equal to 38C (100.4F)  dextrose 40% Gel 15 Gram(s) Oral once PRN Blood Glucose LESS THAN 70 milliGRAM(s)/deciliter  glucagon  Injectable 1 milliGRAM(s) IntraMuscular once PRN Glucose LESS THAN 70 milligrams/deciliter  oxyCODONE    IR 10 milliGRAM(s) Oral every 6 hours PRN Severe Pain (7 - 10)      Vital Signs Last 24 Hrs  T(C): 36.9 (02 Dec 2018 05:16), Max: 36.9 (01 Dec 2018 22:24)  T(F): 98.4 (02 Dec 2018 05:16), Max: 98.5 (01 Dec 2018 22:24)  HR: 89 (02 Dec 2018 05:16) (82 - 89)  BP: 146/96 (02 Dec 2018 05:16) (127/72 - 146/96)  BP(mean): --  RR: 18 (02 Dec 2018 05:16) (18 - 20)  SpO2: 97% (02 Dec 2018 05:16) (96% - 100%)  nc/at  s1s2  cta  soft, nt, nd no guarding or rebound  no c/c/e    CBC Full  -  ( 02 Dec 2018 07:20 )  WBC Count : 10.19 K/uL  Hemoglobin : 9.0 g/dL  Hematocrit : 28.9 %  Platelet Count - Automated : 643 K/uL  Mean Cell Volume : 79.6 fL  Mean Cell Hemoglobin : 24.8 pg  Mean Cell Hemoglobin Concentration : 31.1 %  Auto Neutrophil # : x  Auto Lymphocyte # : x  Auto Monocyte # : x  Auto Eosinophil # : x  Auto Basophil # : x  Auto Neutrophil % : x  Auto Lymphocyte % : x  Auto Monocyte % : x  Auto Eosinophil % : x  Auto Basophil % : x    12-02    138  |  99  |  28<H>  ----------------------------<  107<H>  4.0   |  28  |  0.84    Ca    10.5      02 Dec 2018 07:20    TPro  6.7  /  Alb  3.1<L>  /  TBili  < 0.2<L>  /  DBili  x   /  AST  7   /  ALT  6   /  AlkPhos  60  12-02

## 2018-12-02 NOTE — PROGRESS NOTE ADULT - SUBJECTIVE AND OBJECTIVE BOX
CHIEF COMPLAINT:Patient is a 68y old  Female who presents with a chief complaint of Left foot cellulitis (01 Dec 2018 09:07)    	        PAST MEDICAL & SURGICAL HISTORY:  DM (diabetes mellitus)  HTN (hypertension)  Breast CA  No significant past surgical history          REVIEW OF SYSTEMS:  CONSTITUTIONAL: No fever, weight loss, or fatigue  EYES: No eye pain, visual disturbances, or discharge  NECK: No pain or stiffness  RESPIRATORY: No cough, wheezing, chills or hemoptysis; No Shortness of Breath  CARDIOVASCULAR: No chest pain, palpitations, passing out, dizziness,  GASTROINTESTINAL: No abdominal or epigastric pain. No nausea, vomiting, or hematemesis; No diarrhea or constipation. No melena or hematochezia.  GENITOURINARY: No dysuria, frequency, hematuria, or incontinence  NEUROLOGICAL: No headaches  l foot pain controlled    Medications:  MEDICATIONS  (STANDING):  cilostazol 100 milliGRAM(s) Oral two times a day  dextrose 5%. 1000 milliLiter(s) (50 mL/Hr) IV Continuous <Continuous>  dextrose 50% Injectable 12.5 Gram(s) IV Push once  gabapentin 200 milliGRAM(s) Oral three times a day  insulin lispro (HumaLOG) corrective regimen sliding scale   SubCutaneous three times a day before meals  lidocaine   Patch 1 Patch Transdermal daily  lisinopril 10 milliGRAM(s) Oral daily  methylPREDNISolone sodium succinate Injectable 125 milliGRAM(s) IV Push daily    MEDICATIONS  (PRN):  acetaminophen   Tablet .. 975 milliGRAM(s) Oral every 6 hours PRN Mild Pain (1 - 3)  acetaminophen   Tablet .. 650 milliGRAM(s) Oral every 6 hours PRN Temp greater or equal to 38C (100.4F)  dextrose 40% Gel 15 Gram(s) Oral once PRN Blood Glucose LESS THAN 70 milliGRAM(s)/deciliter  glucagon  Injectable 1 milliGRAM(s) IntraMuscular once PRN Glucose LESS THAN 70 milligrams/deciliter  oxyCODONE    IR 10 milliGRAM(s) Oral every 6 hours PRN Severe Pain (7 - 10)    	    PHYSICAL EXAM:  T(C): 36.9 (12-02-18 @ 05:16), Max: 36.9 (12-01-18 @ 22:24)  HR: 89 (12-02-18 @ 05:16) (82 - 89)  BP: 146/96 (12-02-18 @ 05:16) (127/72 - 146/96)  RR: 18 (12-02-18 @ 05:16) (18 - 20)  SpO2: 97% (12-02-18 @ 05:16) (96% - 100%)  Wt(kg): --  I&O's Summary      Appearance: Normal	  HEENT:   Normal oral mucosa, PERRL, EOMI	  Lymphatic: No lymphadenopathy  Cardiovascular: Normal S1 S2, No JVD, No murmurs  Respiratory: Lungs clear to auscultation	  Psychiatry: A & O x 3,  Gastrointestinal:  Soft, Non-tender, + BS		  Neurologic: Non-focal  Extremities:l   ischemic changes  / demarcating    TELEMETRY: 	    ECG:  	  RADIOLOGY:  OTHER: 	  	  LABS:	 	    CARDIAC MARKERS:                  proBNP:   Lipid Profile:   HgA1c:   TSH:

## 2018-12-02 NOTE — PROGRESS NOTE ADULT - ASSESSMENT
67 yo F w pmhx of T2DM, HTN presenting with 1 month of LLE swelling and discoloration admitted for  presumed cellulitis       seen by id and abs stopped  monitor off abx per ID as per id  uptrending WBC likely 2/2 steroids      ·  Problem: HTN (hypertension).   : BP well controlled  c/w meds      ·  Problem: DM (diabetes mellitus).    : Blood glucose well controlled  C/w sliding scale insulin.       ·  Problem: Microcytic anemia.    likely KRIS,   heme f/u noted    ·  Problem: Ischemia of LLE - off Heparin gtt  Rheum and Heme appreciated, vasculitis/APLS w/u neg  stopped a/c as per/vasc/rheum  Derm appreciated, no role of repeat skin bx at this time  MRA LLE  done results noted    2nd opinion Rheum and Heme noted,and following  cont pletal  Steroids started as per  Rheum, although no clear indication, can be tried at this time with minimal downside    Adrenal incidenteloma - MRI reviewed, appears to be adrenal adenoma, Surg f/u      likely plan for amputation of toes or foot vs BKA LLE after complete demarcation  vascular to follow up for proposed sx    ipmn - GI appreciated, f/u CT outpt in 1 year    records sent to [facility], as daughter wishes pt to transfer there not has been rejected

## 2018-12-03 LAB
BUN SERPL-MCNC: 23 MG/DL — SIGNIFICANT CHANGE UP (ref 7–23)
CALCIUM SERPL-MCNC: 10.2 MG/DL — SIGNIFICANT CHANGE UP (ref 8.4–10.5)
CHLORIDE SERPL-SCNC: 100 MMOL/L — SIGNIFICANT CHANGE UP (ref 98–107)
CO2 SERPL-SCNC: 29 MMOL/L — SIGNIFICANT CHANGE UP (ref 22–31)
CREAT SERPL-MCNC: 0.86 MG/DL — SIGNIFICANT CHANGE UP (ref 0.5–1.3)
GLUCOSE BLDC GLUCOMTR-MCNC: 121 MG/DL — HIGH (ref 70–99)
GLUCOSE BLDC GLUCOMTR-MCNC: 134 MG/DL — HIGH (ref 70–99)
GLUCOSE BLDC GLUCOMTR-MCNC: 140 MG/DL — HIGH (ref 70–99)
GLUCOSE BLDC GLUCOMTR-MCNC: 309 MG/DL — HIGH (ref 70–99)
GLUCOSE SERPL-MCNC: 113 MG/DL — HIGH (ref 70–99)
HCT VFR BLD CALC: 27.7 % — LOW (ref 34.5–45)
HGB BLD-MCNC: 8.4 G/DL — LOW (ref 11.5–15.5)
MCHC RBC-ENTMCNC: 23.9 PG — LOW (ref 27–34)
MCHC RBC-ENTMCNC: 30.3 % — LOW (ref 32–36)
MCV RBC AUTO: 78.7 FL — LOW (ref 80–100)
NRBC # FLD: 0.02 — SIGNIFICANT CHANGE UP
PLATELET # BLD AUTO: 666 K/UL — HIGH (ref 150–400)
PMV BLD: 9.1 FL — SIGNIFICANT CHANGE UP (ref 7–13)
POTASSIUM SERPL-MCNC: 4.2 MMOL/L — SIGNIFICANT CHANGE UP (ref 3.5–5.3)
POTASSIUM SERPL-SCNC: 4.2 MMOL/L — SIGNIFICANT CHANGE UP (ref 3.5–5.3)
RBC # BLD: 3.52 M/UL — LOW (ref 3.8–5.2)
RBC # FLD: 17.8 % — HIGH (ref 10.3–14.5)
SODIUM SERPL-SCNC: 139 MMOL/L — SIGNIFICANT CHANGE UP (ref 135–145)
WBC # BLD: 11.9 K/UL — HIGH (ref 3.8–10.5)
WBC # FLD AUTO: 11.9 K/UL — HIGH (ref 3.8–10.5)

## 2018-12-03 PROCEDURE — 99232 SBSQ HOSP IP/OBS MODERATE 35: CPT

## 2018-12-03 RX ADMIN — Medication 125 MILLIGRAM(S): at 05:11

## 2018-12-03 RX ADMIN — GABAPENTIN 200 MILLIGRAM(S): 400 CAPSULE ORAL at 05:10

## 2018-12-03 RX ADMIN — LISINOPRIL 10 MILLIGRAM(S): 2.5 TABLET ORAL at 05:11

## 2018-12-03 RX ADMIN — GABAPENTIN 200 MILLIGRAM(S): 400 CAPSULE ORAL at 13:31

## 2018-12-03 RX ADMIN — CILOSTAZOL 100 MILLIGRAM(S): 100 TABLET ORAL at 17:52

## 2018-12-03 RX ADMIN — Medication 4: at 12:51

## 2018-12-03 RX ADMIN — GABAPENTIN 200 MILLIGRAM(S): 400 CAPSULE ORAL at 22:02

## 2018-12-03 RX ADMIN — CILOSTAZOL 100 MILLIGRAM(S): 100 TABLET ORAL at 05:11

## 2018-12-03 NOTE — PROGRESS NOTE ADULT - SUBJECTIVE AND OBJECTIVE BOX
Notified today that pt was not transferred  Fu re isch L foot  Pain controlled  No other isch complciations    L LE: foot isch/dry gang cont to demarcate.  No gross infection.  +PT pulse  No ascending isch/infection    A/P:   L foot isch/gang  Atypical presentation (originally tachy, febrile, wbc, palp PT pulse, rapid isch progression etc)  No role for revasc    Rec:   Cont med mgmt. multi spec consults/  AC if safe  Podiatry fu for local care/protection/observation/poss foot salvage etc  May end up w BKA.\  DW'ed pt, daughter and med attd

## 2018-12-03 NOTE — PROGRESS NOTE ADULT - SUBJECTIVE AND OBJECTIVE BOX
RENATA DIXON 68y [id number]    Patient is a 68y old  Female who presents with a chief complaint of Left foot cellulitis (03 Dec 2018 13:44)      Follow Up/CC:  ID following for gangrene    Interval History/ROS: no fevers, foot pain+    Allergies    No Known Allergies    Intolerances        ANTIMICROBIALS:      MEDICATIONS  (STANDING):  cilostazol 100 milliGRAM(s) Oral two times a day  dextrose 5%. 1000 milliLiter(s) (50 mL/Hr) IV Continuous <Continuous>  dextrose 50% Injectable 12.5 Gram(s) IV Push once  gabapentin 200 milliGRAM(s) Oral three times a day  insulin lispro (HumaLOG) corrective regimen sliding scale   SubCutaneous three times a day before meals  lidocaine   Patch 1 Patch Transdermal daily  lisinopril 10 milliGRAM(s) Oral daily  methylPREDNISolone sodium succinate Injectable 125 milliGRAM(s) IV Push daily    MEDICATIONS  (PRN):  acetaminophen   Tablet .. 975 milliGRAM(s) Oral every 6 hours PRN Mild Pain (1 - 3)  acetaminophen   Tablet .. 650 milliGRAM(s) Oral every 6 hours PRN Temp greater or equal to 38C (100.4F)  dextrose 40% Gel 15 Gram(s) Oral once PRN Blood Glucose LESS THAN 70 milliGRAM(s)/deciliter  glucagon  Injectable 1 milliGRAM(s) IntraMuscular once PRN Glucose LESS THAN 70 milligrams/deciliter  oxyCODONE    IR 10 milliGRAM(s) Oral every 6 hours PRN Severe Pain (7 - 10)        Vital Signs Last 24 Hrs  T(C): 36.8 (03 Dec 2018 12:52), Max: 36.9 (02 Dec 2018 21:12)  T(F): 98.3 (03 Dec 2018 12:52), Max: 98.4 (02 Dec 2018 21:12)  HR: 97 (03 Dec 2018 12:52) (94 - 107)  BP: 121/88 (03 Dec 2018 12:52) (121/88 - 134/80)  BP(mean): --  RR: 18 (03 Dec 2018 12:52) (17 - 18)  SpO2: 97% (03 Dec 2018 12:52) (97% - 100%)    CBC Full  -  ( 03 Dec 2018 06:35 )  WBC Count : 11.90 K/uL  Hemoglobin : 8.4 g/dL  Hematocrit : 27.7 %  Platelet Count - Automated : 666 K/uL  Mean Cell Volume : 78.7 fL  Mean Cell Hemoglobin : 23.9 pg  Mean Cell Hemoglobin Concentration : 30.3 %  Auto Neutrophil # : x  Auto Lymphocyte # : x  Auto Monocyte # : x  Auto Eosinophil # : x  Auto Basophil # : x  Auto Neutrophil % : x  Auto Lymphocyte % : x  Auto Monocyte % : x  Auto Eosinophil % : x  Auto Basophil % : x    12-03    139  |  100  |  23  ----------------------------<  113<H>  4.2   |  29  |  0.86    Ca    10.2      03 Dec 2018 06:35    TPro  6.7  /  Alb  3.1<L>  /  TBili  < 0.2<L>  /  DBili  x   /  AST  7   /  ALT  6   /  AlkPhos  60  12-02    LIVER FUNCTIONS - ( 02 Dec 2018 07:20 )  Alb: 3.1 g/dL / Pro: 6.7 g/dL / ALK PHOS: 60 u/L / ALT: 6 u/L / AST: 7 u/L / GGT: x               MICROBIOLOGY:  Culture - Group A Streptococcus (11.19.18 @ 16:56)    Culture - Group A Streptococcus:   NO BETA STREP. ISOLATED AFTER 48HRS.    Specimen Source: THROAT    Culture - Blood (11.18.18 @ 06:45)    Culture - Blood:   NO ORGANISMS ISOLATED    Specimen Source: BLOOD VENOUS    Culture - Blood (11.18.18 @ 06:45)    Culture - Blood:   NO ORGANISMS ISOLATED    Specimen Source: BLOOD VENOUS    RADIOLOGY    < from: NM 3-Phase Bone Scan (11.25.18 @ 16:18) >  Absent flow, blood pool and tracer uptake in the left mid foot and left   forefoot compatible with gangrene/osteomyelitis.    Small focal uptake in the first metatarsal phalangeal region of the right   foot, probably arthritic.    < end of copied text >

## 2018-12-03 NOTE — PROGRESS NOTE ADULT - ASSESSMENT
68F with DM, HTN, weight loss, admitted on 11/14/18 with left foot pain of several months duration  Left foot pain/ fever/ leukocytosis  Treated empirically for cellulitis with 7 day course of abx through 7/21  Seen by rheumatology.  Pathology with "endarteritis obliterans". Rheumatology DDx is  Buergers thromboangiitis obliterans or livedoid vasculopathy . This patient has been smoking since age 19. She states she has quit smoking since just prior to current admission.  Anti-phospholipid serologies are negative  On therapeutic steroid trial: Solumedrol 125 daily 11/28-->    Ischemic foot with dry gangrene   increased leukocytosis secondary to tissue damage + steroids    Suggest:  Continue to observe off abx.

## 2018-12-03 NOTE — PROGRESS NOTE ADULT - SUBJECTIVE AND OBJECTIVE BOX
Pt is seen and examined  pt is awake and lying in bed  no new complaints  awaiting transfer to [facility] if accepted      PAST MEDICAL & SURGICAL HISTORY:  DM (diabetes mellitus)  HTN (hypertension)  Breast CA  No significant past surgical history      ROS:  Negative except for: neg    MEDICATIONS  (STANDING):  cilostazol 100 milliGRAM(s) Oral two times a day  dextrose 5%. 1000 milliLiter(s) (50 mL/Hr) IV Continuous <Continuous>  dextrose 50% Injectable 12.5 Gram(s) IV Push once  gabapentin 200 milliGRAM(s) Oral three times a day  insulin lispro (HumaLOG) corrective regimen sliding scale   SubCutaneous three times a day before meals  lidocaine   Patch 1 Patch Transdermal daily  lisinopril 10 milliGRAM(s) Oral daily  methylPREDNISolone sodium succinate Injectable 125 milliGRAM(s) IV Push daily    MEDICATIONS  (PRN):  acetaminophen   Tablet .. 975 milliGRAM(s) Oral every 6 hours PRN Mild Pain (1 - 3)  acetaminophen   Tablet .. 650 milliGRAM(s) Oral every 6 hours PRN Temp greater or equal to 38C (100.4F)  dextrose 40% Gel 15 Gram(s) Oral once PRN Blood Glucose LESS THAN 70 milliGRAM(s)/deciliter  glucagon  Injectable 1 milliGRAM(s) IntraMuscular once PRN Glucose LESS THAN 70 milligrams/deciliter  oxyCODONE    IR 10 milliGRAM(s) Oral every 6 hours PRN Severe Pain (7 - 10)      Allergies    No Known Allergies    Intolerances        Vital Signs Last 24 Hrs  T(C): 36.8 (03 Dec 2018 05:07), Max: 37.2 (02 Dec 2018 14:10)  T(F): 98.2 (03 Dec 2018 05:07), Max: 98.9 (02 Dec 2018 14:10)  HR: 94 (03 Dec 2018 05:07) (94 - 107)  BP: 134/80 (03 Dec 2018 05:07) (123/63 - 134/80)  BP(mean): --  RR: 18 (03 Dec 2018 05:07) (17 - 18)  SpO2: 100% (03 Dec 2018 05:07) (97% - 100%)    PHYSICAL EXAM    Awake, alert  Anicteric, MMM  RRR S1,S2+  CTAB No ronchi  Abd soft, NT, ND, + BS  RLE without abnml. L foot dry gangrene        LABS:                          8.4    11.90 )-----------( 666      ( 03 Dec 2018 06:35 )             27.7     Serial CBC's  12-03 @ 06:35  Hct-27.7 / Hgb-8.4 / Plat-666 / RBC-3.52 / WBC-11.90          Serial CBC's  12-02 @ 07:20  Hct-28.9 / Hgb-9.0 / Plat-643 / RBC-3.63 / WBC-10.19            12-02    138  |  99  |  28<H>  ----------------------------<  107<H>  4.0   |  28  |  0.84    Ca    10.5      02 Dec 2018 07:20    TPro  6.7  /  Alb  3.1<L>  /  TBili  < 0.2<L>  /  DBili  x   /  AST  7   /  ALT  6   /  AlkPhos  60  12-02          WBC Count: 11.90 K/uL (12-03 @ 06:35)  Hemoglobin: 8.4 g/dL (12-03 @ 06:35)            RADIOLOGY & ADDITIONAL STUDIES:

## 2018-12-03 NOTE — PROGRESS NOTE ADULT - SUBJECTIVE AND OBJECTIVE BOX
INTERVAL HPI/OVERNIGHT EVENTS:  Pt with no new complaints; ischemia of foot has stabilized    MEDICATIONS  (STANDING):  cilostazol 100 milliGRAM(s) Oral two times a day  dextrose 5%. 1000 milliLiter(s) (50 mL/Hr) IV Continuous <Continuous>  dextrose 50% Injectable 12.5 Gram(s) IV Push once  gabapentin 200 milliGRAM(s) Oral three times a day  insulin lispro (HumaLOG) corrective regimen sliding scale   SubCutaneous three times a day before meals  lidocaine   Patch 1 Patch Transdermal daily  lisinopril 10 milliGRAM(s) Oral daily  methylPREDNISolone sodium succinate Injectable 125 milliGRAM(s) IV Push daily    MEDICATIONS  (PRN):  acetaminophen   Tablet .. 975 milliGRAM(s) Oral every 6 hours PRN Mild Pain (1 - 3)  acetaminophen   Tablet .. 650 milliGRAM(s) Oral every 6 hours PRN Temp greater or equal to 38C (100.4F)  dextrose 40% Gel 15 Gram(s) Oral once PRN Blood Glucose LESS THAN 70 milliGRAM(s)/deciliter  glucagon  Injectable 1 milliGRAM(s) IntraMuscular once PRN Glucose LESS THAN 70 milligrams/deciliter  oxyCODONE    IR 10 milliGRAM(s) Oral every 6 hours PRN Severe Pain (7 - 10)      Allergies    No Known Allergies    Intolerances        REVIEW OF SYSTEMS      	  Vital Signs Last 24 Hrs  T(C): 36.9 (03 Dec 2018 20:58), Max: 36.9 (03 Dec 2018 20:58)  T(F): 98.4 (03 Dec 2018 20:58), Max: 98.4 (03 Dec 2018 20:58)  HR: 89 (03 Dec 2018 20:58) (89 - 97)  BP: 134/87 (03 Dec 2018 20:58) (121/88 - 134/87)  BP(mean): --  RR: 18 (03 Dec 2018 20:58) (18 - 18)  SpO2: 95% (03 Dec 2018 20:58) (95% - 100%)      PHYSICAL EXAM:    Constitutional:  fair appearing    Eyes:    ENMT:    Neck:  supple    Back:    Extremities:  L foot with gangrene of toes and dark distal half of foot; demarcating area of ischemia    Vascular:  Poor perfusion distal L foot      Skin:    Lymph Nodes:    Musculoskeletal:    Psychiatric:  alert, appropriate    LABS:                        8.4    11.90 )-----------( 666      ( 03 Dec 2018 06:35 )             27.7     12-03    139  |  100  |  23  ----------------------------<  113<H>  4.2   |  29  |  0.86    Ca    10.2      03 Dec 2018 06:35    TPro  6.7  /  Alb  3.1<L>  /  TBili  < 0.2<L>  /  DBili  x   /  AST  7   /  ALT  6   /  AlkPhos  60  12-02          RADIOLOGY & ADDITIONAL TESTS:

## 2018-12-03 NOTE — PROGRESS NOTE ADULT - ASSESSMENT
Imp:   68 yo female with h/o DM, HTN, Smoking p/w L foot ischemia, possible Buergers disease.   No significant response to steroids    Rec:  Can steroid tapering steroids; eg, prednisone 40mg and taper by 10mg every few days until 20mg, then taper by 5mg every few days  Awaiting likely BKA; followed by vascular

## 2018-12-03 NOTE — PROGRESS NOTE ADULT - SUBJECTIVE AND OBJECTIVE BOX
CHIEF COMPLAINT:Patient is a 68y old  Female who presents with a chief complaint of Left foot cellulitis (01 Dec 2018 09:07)  no acute events    PAST MEDICAL & SURGICAL HISTORY:  DM (diabetes mellitus)  HTN (hypertension)  Breast CA  No significant past surgical history          REVIEW OF SYSTEMS:  CONSTITUTIONAL: No fever, weight loss, or fatigue  EYES: No eye pain, visual disturbances, or discharge  NECK: No pain or stiffness  RESPIRATORY: No cough, wheezing, chills or hemoptysis; No Shortness of Breath  CARDIOVASCULAR: No chest pain, palpitations, passing out, dizziness,  GASTROINTESTINAL: No abdominal or epigastric pain. No nausea, vomiting, or hematemesis; No diarrhea or constipation. No melena or hematochezia.  GENITOURINARY: No dysuria, frequency, hematuria, or incontinence  NEUROLOGICAL: No headaches  l foot pain controlled      Medications:  MEDICATIONS  (STANDING):  cilostazol 100 milliGRAM(s) Oral two times a day  dextrose 5%. 1000 milliLiter(s) (50 mL/Hr) IV Continuous <Continuous>  dextrose 50% Injectable 12.5 Gram(s) IV Push once  gabapentin 200 milliGRAM(s) Oral three times a day  insulin lispro (HumaLOG) corrective regimen sliding scale   SubCutaneous three times a day before meals  lidocaine   Patch 1 Patch Transdermal daily  lisinopril 10 milliGRAM(s) Oral daily  methylPREDNISolone sodium succinate Injectable 125 milliGRAM(s) IV Push daily    MEDICATIONS  (PRN):  acetaminophen   Tablet .. 975 milliGRAM(s) Oral every 6 hours PRN Mild Pain (1 - 3)  acetaminophen   Tablet .. 650 milliGRAM(s) Oral every 6 hours PRN Temp greater or equal to 38C (100.4F)  dextrose 40% Gel 15 Gram(s) Oral once PRN Blood Glucose LESS THAN 70 milliGRAM(s)/deciliter  glucagon  Injectable 1 milliGRAM(s) IntraMuscular once PRN Glucose LESS THAN 70 milligrams/deciliter  oxyCODONE    IR 10 milliGRAM(s) Oral every 6 hours PRN Severe Pain (7 - 10)    	    PHYSICAL EXAM:  T(C): 36.8 (12-03-18 @ 12:52), Max: 37.2 (12-02-18 @ 14:10)  HR: 97 (12-03-18 @ 12:52) (94 - 107)  BP: 121/88 (12-03-18 @ 12:52) (121/88 - 134/80)  RR: 18 (12-03-18 @ 12:52) (17 - 18)  SpO2: 97% (12-03-18 @ 12:52) (97% - 100%)  Wt(kg): --  I&O's Summary    02 Dec 2018 07:01  -  03 Dec 2018 07:00  --------------------------------------------------------  IN: 60 mL / OUT: 0 mL / NET: 60 mL      Appearance: Normal	  HEENT:   Normal oral mucosa, PERRL, EOMI	  Lymphatic: No lymphadenopathy  Cardiovascular: Normal S1 S2, No JVD, No murmurs  Respiratory: Lungs clear to auscultation	  Psychiatry: A & O x 3,  Gastrointestinal:  Soft, Non-tender, + BS		  Neurologic: Non-focal  Extremities:l   ischemic changes  / demarcating    LABS:	 	    CARDIAC MARKERS:                                8.4    11.90 )-----------( 666      ( 03 Dec 2018 06:35 )             27.7     12-03    139  |  100  |  23  ----------------------------<  113<H>  4.2   |  29  |  0.86    Ca    10.2      03 Dec 2018 06:35    TPro  6.7  /  Alb  3.1<L>  /  TBili  < 0.2<L>  /  DBili  x   /  AST  7   /  ALT  6   /  AlkPhos  60  12-02    proBNP:   Lipid Profile:   HgA1c:   TSH:

## 2018-12-03 NOTE — PROGRESS NOTE ADULT - ASSESSMENT
1. LLE Gangrene    -- pt requesting to transfer care to another hospital, awaiting accepting physician  -- vasculitis  likely related to endarteritis obliterans. Low suspicion for APLS as her B2 glycoprotein Abs neg and anticardiolipin IgG and IgM neg  -- pt off of Heparin gtt   -- f/u vascular, ID, and rheum  -- off of Abx    -- short course steroids completed, as per rheum  -- WBC improved, likely some degree of steroid effect    2. anemia likely anemia of chronic inflammation +/- KRIS    --  Repeat Fe panel   -- check soluble transferrin receptor and alpha thal as outpt  -- counts adequate for now  -- No B12/Folate Def  -- No Hemolysis  -- No monoclonal gammopathy on SPEP or TANNER    3. thrombocytosis     -- Reactive  -- No indication for cytoreduction    4. Side branch IPMN     -- GI following  -- ca19-9 not elevated  -- outpt follow up    Olga Gerardo MD  [phone]

## 2018-12-03 NOTE — PROGRESS NOTE ADULT - ATTENDING COMMENTS
Pravin Phelan  Attending Physician   Division of Infectious Disease  Pager #[phone]  After 5pm/weekend or no response, call #[phone]    Will sign off, recall ID if needed #[phone].

## 2018-12-03 NOTE — PROGRESS NOTE ADULT - SUBJECTIVE AND OBJECTIVE BOX
RENATA DIXON:[id number],   68yFemale followed for:  No Known Allergies    PAST MEDICAL & SURGICAL HISTORY:  DM (diabetes mellitus)  HTN (hypertension)  Breast CA  No significant past surgical history    FAMILY HISTORY:  No pertinent family history in first degree relatives    MEDICATIONS  (STANDING):  cilostazol 100 milliGRAM(s) Oral two times a day  dextrose 5%. 1000 milliLiter(s) (50 mL/Hr) IV Continuous <Continuous>  dextrose 50% Injectable 12.5 Gram(s) IV Push once  gabapentin 200 milliGRAM(s) Oral three times a day  insulin lispro (HumaLOG) corrective regimen sliding scale   SubCutaneous three times a day before meals  lidocaine   Patch 1 Patch Transdermal daily  lisinopril 10 milliGRAM(s) Oral daily  methylPREDNISolone sodium succinate Injectable 125 milliGRAM(s) IV Push daily    MEDICATIONS  (PRN):  acetaminophen   Tablet .. 975 milliGRAM(s) Oral every 6 hours PRN Mild Pain (1 - 3)  acetaminophen   Tablet .. 650 milliGRAM(s) Oral every 6 hours PRN Temp greater or equal to 38C (100.4F)  dextrose 40% Gel 15 Gram(s) Oral once PRN Blood Glucose LESS THAN 70 milliGRAM(s)/deciliter  glucagon  Injectable 1 milliGRAM(s) IntraMuscular once PRN Glucose LESS THAN 70 milligrams/deciliter  oxyCODONE    IR 10 milliGRAM(s) Oral every 6 hours PRN Severe Pain (7 - 10)      Vital Signs Last 24 Hrs  T(C): 36.8 (03 Dec 2018 05:07), Max: 37.2 (02 Dec 2018 14:10)  T(F): 98.2 (03 Dec 2018 05:07), Max: 98.9 (02 Dec 2018 14:10)  HR: 94 (03 Dec 2018 05:07) (94 - 107)  BP: 134/80 (03 Dec 2018 05:07) (123/63 - 134/80)  BP(mean): --  RR: 18 (03 Dec 2018 05:07) (17 - 18)  SpO2: 100% (03 Dec 2018 05:07) (97% - 100%)  nc/at  s1s2  cta  soft, nt, nd no guarding or rebound  no c/c/e    CBC Full  -  ( 03 Dec 2018 06:35 )  WBC Count : 11.90 K/uL  Hemoglobin : 8.4 g/dL  Hematocrit : 27.7 %  Platelet Count - Automated : 666 K/uL  Mean Cell Volume : 78.7 fL  Mean Cell Hemoglobin : 23.9 pg  Mean Cell Hemoglobin Concentration : 30.3 %  Auto Neutrophil # : x  Auto Lymphocyte # : x  Auto Monocyte # : x  Auto Eosinophil # : x  Auto Basophil # : x  Auto Neutrophil % : x  Auto Lymphocyte % : x  Auto Monocyte % : x  Auto Eosinophil % : x  Auto Basophil % : x    12-03    139  |  100  |  23  ----------------------------<  113<H>  4.2   |  29  |  0.86    Ca    10.2      03 Dec 2018 06:35    TPro  6.7  /  Alb  3.1<L>  /  TBili  < 0.2<L>  /  DBili  x   /  AST  7   /  ALT  6   /  AlkPhos  60  12-02

## 2018-12-03 NOTE — PROGRESS NOTE ADULT - ASSESSMENT
67 yo F w pmhx of T2DM, HTN presenting with 1 month of LLE swelling and discoloration admitted for  presumed cellulitis       seen by id and abs stopped  monitor off abx per ID as per id  uptrending WBC likely 2/2 steroids      ·  Problem: HTN (hypertension).   : BP well controlled  c/w meds      ·  Problem: DM (diabetes mellitus).    : Blood glucose well controlled  C/w sliding scale insulin.       ·  Problem: Microcytic anemia.    likely KRIS,   heme f/u noted    ·  Problem: Ischemia of LLE - off Heparin gtt, case discussed with vascular, will start pt on eliquis  Rheum and Heme appreciated, vasculitis/APLS w/u neg  stopped a/c as per/vasc/rheum  Derm appreciated, no role of repeat skin bx at this time  MRA LLE  done results noted    2nd opinion Rheum and Heme noted,and following  cont pletal  Steroids started as per  Rheum, transition to PO today    Adrenal incidenteloma - MRI reviewed, appears to be adrenal adenoma, Surg f/u      likely plan for amputation of toes or foot vs BKA LLE after complete demarcation  vascular to follow up for proposed sx    ipmn - GI appreciated, f/u CT outpt in 1 year    records sent to [facility], as daughter wishes pt to transfer there not has been rejected

## 2018-12-04 LAB
BUN SERPL-MCNC: 22 MG/DL — SIGNIFICANT CHANGE UP (ref 7–23)
CALCIUM SERPL-MCNC: 10.2 MG/DL — SIGNIFICANT CHANGE UP (ref 8.4–10.5)
CARDIOLIPIN IGM SER-MCNC: 1.92 MPL — SIGNIFICANT CHANGE UP (ref 0–11)
CARDIOLIPIN IGM SER-MCNC: 6.32 GPL — SIGNIFICANT CHANGE UP (ref 0–23)
CHLORIDE SERPL-SCNC: 99 MMOL/L — SIGNIFICANT CHANGE UP (ref 98–107)
CO2 SERPL-SCNC: 27 MMOL/L — SIGNIFICANT CHANGE UP (ref 22–31)
CREAT SERPL-MCNC: 0.82 MG/DL — SIGNIFICANT CHANGE UP (ref 0.5–1.3)
FERRITIN SERPL-MCNC: 316 NG/ML — HIGH (ref 15–150)
GLUCOSE BLDC GLUCOMTR-MCNC: 128 MG/DL — HIGH (ref 70–99)
GLUCOSE BLDC GLUCOMTR-MCNC: 175 MG/DL — HIGH (ref 70–99)
GLUCOSE BLDC GLUCOMTR-MCNC: 181 MG/DL — HIGH (ref 70–99)
GLUCOSE BLDC GLUCOMTR-MCNC: 209 MG/DL — HIGH (ref 70–99)
GLUCOSE SERPL-MCNC: 132 MG/DL — HIGH (ref 70–99)
HCT VFR BLD CALC: 29.3 % — LOW (ref 34.5–45)
HGB BLD-MCNC: 9 G/DL — LOW (ref 11.5–15.5)
IRON SATN MFR SERPL: 251 UG/DL — SIGNIFICANT CHANGE UP (ref 140–530)
IRON SATN MFR SERPL: 47 UG/DL — SIGNIFICANT CHANGE UP (ref 30–160)
MAGNESIUM SERPL-MCNC: 2.1 MG/DL — SIGNIFICANT CHANGE UP (ref 1.6–2.6)
MCHC RBC-ENTMCNC: 24.5 PG — LOW (ref 27–34)
MCHC RBC-ENTMCNC: 30.7 % — LOW (ref 32–36)
MCV RBC AUTO: 79.8 FL — LOW (ref 80–100)
NRBC # FLD: 0 — SIGNIFICANT CHANGE UP
PHOSPHATE SERPL-MCNC: 2.4 MG/DL — LOW (ref 2.5–4.5)
PLATELET # BLD AUTO: 641 K/UL — HIGH (ref 150–400)
PMV BLD: 8.8 FL — SIGNIFICANT CHANGE UP (ref 7–13)
POTASSIUM SERPL-MCNC: 4.1 MMOL/L — SIGNIFICANT CHANGE UP (ref 3.5–5.3)
POTASSIUM SERPL-SCNC: 4.1 MMOL/L — SIGNIFICANT CHANGE UP (ref 3.5–5.3)
RBC # BLD: 3.67 M/UL — LOW (ref 3.8–5.2)
RBC # FLD: 18 % — HIGH (ref 10.3–14.5)
SODIUM SERPL-SCNC: 138 MMOL/L — SIGNIFICANT CHANGE UP (ref 135–145)
UIBC SERPL-MCNC: 203.8 UG/DL — SIGNIFICANT CHANGE UP (ref 110–370)
WBC # BLD: 12.71 K/UL — HIGH (ref 3.8–10.5)
WBC # FLD AUTO: 12.71 K/UL — HIGH (ref 3.8–10.5)

## 2018-12-04 RX ORDER — APIXABAN 2.5 MG/1
5 TABLET, FILM COATED ORAL EVERY 12 HOURS
Qty: 0 | Refills: 0 | Status: DISCONTINUED | OUTPATIENT
Start: 2018-12-04 | End: 2018-12-10

## 2018-12-04 RX ORDER — POTASSIUM PHOSPHATE, MONOBASIC POTASSIUM PHOSPHATE, DIBASIC 236; 224 MG/ML; MG/ML
15 INJECTION, SOLUTION INTRAVENOUS ONCE
Qty: 0 | Refills: 0 | Status: COMPLETED | OUTPATIENT
Start: 2018-12-04 | End: 2018-12-04

## 2018-12-04 RX ADMIN — APIXABAN 5 MILLIGRAM(S): 2.5 TABLET, FILM COATED ORAL at 19:10

## 2018-12-04 RX ADMIN — Medication 2: at 12:36

## 2018-12-04 RX ADMIN — CILOSTAZOL 100 MILLIGRAM(S): 100 TABLET ORAL at 22:07

## 2018-12-04 RX ADMIN — GABAPENTIN 200 MILLIGRAM(S): 400 CAPSULE ORAL at 15:04

## 2018-12-04 RX ADMIN — POTASSIUM PHOSPHATE, MONOBASIC POTASSIUM PHOSPHATE, DIBASIC 62.5 MILLIMOLE(S): 236; 224 INJECTION, SOLUTION INTRAVENOUS at 11:59

## 2018-12-04 RX ADMIN — Medication 1: at 18:00

## 2018-12-04 RX ADMIN — GABAPENTIN 200 MILLIGRAM(S): 400 CAPSULE ORAL at 05:40

## 2018-12-04 RX ADMIN — GABAPENTIN 200 MILLIGRAM(S): 400 CAPSULE ORAL at 22:08

## 2018-12-04 RX ADMIN — Medication 40 MILLIGRAM(S): at 11:59

## 2018-12-04 RX ADMIN — CILOSTAZOL 100 MILLIGRAM(S): 100 TABLET ORAL at 05:40

## 2018-12-04 RX ADMIN — Medication 125 MILLIGRAM(S): at 05:41

## 2018-12-04 RX ADMIN — LISINOPRIL 10 MILLIGRAM(S): 2.5 TABLET ORAL at 05:41

## 2018-12-04 NOTE — CHART NOTE - NSCHARTNOTEFT_GEN_A_CORE
Patient was being followed for left foot discoloration and ischemia. We had recommended heparin gtt until final work up is completed.    Private heme has been consulted who is currently actively following the patient.    House heme will sign off.

## 2018-12-04 NOTE — PROGRESS NOTE ADULT - SUBJECTIVE AND OBJECTIVE BOX
RENATA DIXON:[id number],   68yFemale followed for:  No Known Allergies    PAST MEDICAL & SURGICAL HISTORY:  DM (diabetes mellitus)  HTN (hypertension)  Breast CA  No significant past surgical history    FAMILY HISTORY:  No pertinent family history in first degree relatives    MEDICATIONS  (STANDING):  cilostazol 100 milliGRAM(s) Oral two times a day  dextrose 5%. 1000 milliLiter(s) (50 mL/Hr) IV Continuous <Continuous>  dextrose 50% Injectable 12.5 Gram(s) IV Push once  gabapentin 200 milliGRAM(s) Oral three times a day  insulin lispro (HumaLOG) corrective regimen sliding scale   SubCutaneous three times a day before meals  lidocaine   Patch 1 Patch Transdermal daily  lisinopril 10 milliGRAM(s) Oral daily  potassium phosphate IVPB 15 milliMole(s) IV Intermittent once  predniSONE   Tablet 40 milliGRAM(s) Oral daily  predniSONE   Tablet   Oral     MEDICATIONS  (PRN):  acetaminophen   Tablet .. 975 milliGRAM(s) Oral every 6 hours PRN Mild Pain (1 - 3)  acetaminophen   Tablet .. 650 milliGRAM(s) Oral every 6 hours PRN Temp greater or equal to 38C (100.4F)  dextrose 40% Gel 15 Gram(s) Oral once PRN Blood Glucose LESS THAN 70 milliGRAM(s)/deciliter  glucagon  Injectable 1 milliGRAM(s) IntraMuscular once PRN Glucose LESS THAN 70 milligrams/deciliter  oxyCODONE    IR 10 milliGRAM(s) Oral every 6 hours PRN Severe Pain (7 - 10)      Vital Signs Last 24 Hrs  T(C): 37 (04 Dec 2018 05:36), Max: 37 (04 Dec 2018 05:36)  T(F): 98.6 (04 Dec 2018 05:36), Max: 98.6 (04 Dec 2018 05:36)  HR: 94 (04 Dec 2018 05:36) (89 - 97)  BP: 122/76 (04 Dec 2018 05:36) (121/88 - 134/87)  BP(mean): --  RR: 18 (04 Dec 2018 05:36) (18 - 18)  SpO2: 95% (04 Dec 2018 05:36) (95% - 97%)  nc/at  s1s2  cta  soft, nt, nd no guarding or rebound  no c/c/e    CBC Full  -  ( 04 Dec 2018 05:37 )  WBC Count : 12.71 K/uL  Hemoglobin : 9.0 g/dL  Hematocrit : 29.3 %  Platelet Count - Automated : 641 K/uL  Mean Cell Volume : 79.8 fL  Mean Cell Hemoglobin : 24.5 pg  Mean Cell Hemoglobin Concentration : 30.7 %  Auto Neutrophil # : x  Auto Lymphocyte # : x  Auto Monocyte # : x  Auto Eosinophil # : x  Auto Basophil # : x  Auto Neutrophil % : x  Auto Lymphocyte % : x  Auto Monocyte % : x  Auto Eosinophil % : x  Auto Basophil % : x    12-04    138  |  99  |  22  ----------------------------<  132<H>  4.1   |  27  |  0.82    Ca    10.2      04 Dec 2018 05:37  Phos  2.4     12-04  Mg     2.1     12-04

## 2018-12-04 NOTE — PROGRESS NOTE ADULT - ASSESSMENT
67 yo F w pmhx of T2DM, HTN presenting with 1 month of LLE swelling and discoloration admitted for  presumed cellulitis       seen by id and abs stopped  monitor off abx per ID as per id      ·  Problem: HTN (hypertension).   : BP well controlled  c/w meds      ·  Problem: DM (diabetes mellitus).    : Blood glucose well controlled  C/w sliding scale insulin.       ·  Problem: Microcytic anemia.    likely KRIS,   heme f/u noted    ·  Problem: Ischemia of LLE - off Heparin gtt, case discussed with vascular, empiric AC restarted w/Eliquis  Rheum and Heme appreciated, vasculitis/APLS w/u neg  stopped a/c as per/vasc/rheum  Derm appreciated, no role of repeat skin bx at this time  MRA LLE  done results noted    2nd opinion Rheum and Heme noted,and following  cont pletal  Steroids taper per Rheum    Adrenal incidenteloma - MRI reviewed, appears to be adrenal adenoma, Surg f/u      likely plan for amputation of toes or foot vs BKA LLE after complete demarcation  vascular to follow up for proposed sx    ipmn - GI appreciated, f/u CT outpt in 1 year    records sent to [facility], as daughter wishes pt to transfer there, but has been rejected    d/c planning to [facility], w/close outpt f/u vasc surg to decide on timing and extent of amputation

## 2018-12-04 NOTE — PROGRESS NOTE ADULT - SUBJECTIVE AND OBJECTIVE BOX
FU L foot ischemia  Comfortable, slept well    L foot: dry isch/gang changes cont to demarcate- mid plantar, dorsally still unclear w clean unroofed blisters/partial pink underlying tissue.  toes remain w dry gang  No gross infection.  No prox progression  +PT pulse    A/P:   L foot isch  Not a good candidate for vasc intervention w palp PT pulse.  ?etiology    DW'ed podiatry attd: Very small chance of foot salvage- will optimize local care/med mgmt etc.  BKA if foot can't be slavaged.

## 2018-12-04 NOTE — PROGRESS NOTE ADULT - SUBJECTIVE AND OBJECTIVE BOX
CHIEF COMPLAINT:Patient is a 68y old  Female who presents with a chief complaint of Left foot cellulitis (01 Dec 2018 09:07)  no acute events    PAST MEDICAL & SURGICAL HISTORY:  DM (diabetes mellitus)  HTN (hypertension)  Breast CA  No significant past surgical history          REVIEW OF SYSTEMS:  CONSTITUTIONAL: No fever, weight loss, or fatigue  EYES: No eye pain, visual disturbances, or discharge  NECK: No pain or stiffness  RESPIRATORY: No cough, wheezing, chills or hemoptysis; No Shortness of Breath  CARDIOVASCULAR: No chest pain, palpitations, passing out, dizziness,  GASTROINTESTINAL: No abdominal or epigastric pain. No nausea, vomiting, or hematemesis; No diarrhea or constipation. No melena or hematochezia.  GENITOURINARY: No dysuria, frequency, hematuria, or incontinence  NEUROLOGICAL: No headaches  l foot pain controlled      Medications:  MEDICATIONS  (STANDING):  apixaban 5 milliGRAM(s) Oral every 12 hours  cilostazol 100 milliGRAM(s) Oral two times a day  dextrose 5%. 1000 milliLiter(s) (50 mL/Hr) IV Continuous <Continuous>  dextrose 50% Injectable 12.5 Gram(s) IV Push once  gabapentin 200 milliGRAM(s) Oral three times a day  insulin lispro (HumaLOG) corrective regimen sliding scale   SubCutaneous three times a day before meals  lidocaine   Patch 1 Patch Transdermal daily  lisinopril 10 milliGRAM(s) Oral daily  potassium phosphate IVPB 15 milliMole(s) IV Intermittent once  predniSONE   Tablet 40 milliGRAM(s) Oral daily  predniSONE   Tablet   Oral     MEDICATIONS  (PRN):  acetaminophen   Tablet .. 975 milliGRAM(s) Oral every 6 hours PRN Mild Pain (1 - 3)  acetaminophen   Tablet .. 650 milliGRAM(s) Oral every 6 hours PRN Temp greater or equal to 38C (100.4F)  dextrose 40% Gel 15 Gram(s) Oral once PRN Blood Glucose LESS THAN 70 milliGRAM(s)/deciliter  glucagon  Injectable 1 milliGRAM(s) IntraMuscular once PRN Glucose LESS THAN 70 milligrams/deciliter  oxyCODONE    IR 10 milliGRAM(s) Oral every 6 hours PRN Severe Pain (7 - 10)    	    PHYSICAL EXAM:  T(C): 37 (12-04-18 @ 05:36), Max: 37 (12-04-18 @ 05:36)  HR: 94 (12-04-18 @ 05:36) (89 - 97)  BP: 122/76 (12-04-18 @ 05:36) (121/88 - 134/87)  RR: 18 (12-04-18 @ 05:36) (18 - 18)  SpO2: 95% (12-04-18 @ 05:36) (95% - 97%)  Wt(kg): --  I&O's Summary    Appearance: Normal	  HEENT:   Normal oral mucosa, PERRL, EOMI	  Lymphatic: No lymphadenopathy  Cardiovascular: Normal S1 S2, No JVD, No murmurs  Respiratory: Lungs clear to auscultation	  Psychiatry: A & O x 3,  Gastrointestinal:  Soft, Non-tender, + BS		  Neurologic: Non-focal  Extremities:l   ischemic changes  / demarcating    LABS:	 	    CARDIAC MARKERS:                                9.0    12.71 )-----------( 641      ( 04 Dec 2018 05:37 )             29.3     12-04    138  |  99  |  22  ----------------------------<  132<H>  4.1   |  27  |  0.82    Ca    10.2      04 Dec 2018 05:37  Phos  2.4     12-04  Mg     2.1     12-04      proBNP:   Lipid Profile:   HgA1c:   TSH:

## 2018-12-04 NOTE — PROGRESS NOTE ADULT - SUBJECTIVE AND OBJECTIVE BOX
Patient is a 68y old  Female who presents with a chief complaint of Left foot cellulitis (04 Dec 2018 11:05)       INTERVAL HPI/OVERNIGHT EVENTS:  Patient seen and evaluated at bedside to determine if the foot is salvageable at the request of Dr. Lema.  Pt is resting comfortable in NAD. Denies N/V/F/C or pain.  Allergies    No Known Allergies    Intolerances        Vital Signs Last 24 Hrs  T(C): 37 (04 Dec 2018 05:36), Max: 37 (04 Dec 2018 05:36)  T(F): 98.6 (04 Dec 2018 05:36), Max: 98.6 (04 Dec 2018 05:36)  HR: 94 (04 Dec 2018 05:36) (89 - 94)  BP: 122/76 (04 Dec 2018 05:36) (122/76 - 134/87)  BP(mean): --  RR: 18 (04 Dec 2018 05:36) (18 - 18)  SpO2: 95% (04 Dec 2018 05:36) (95% - 95%)    LABS:                        9.0    12.71 )-----------( 641      ( 04 Dec 2018 05:37 )             29.3     12-04    138  |  99  |  22  ----------------------------<  132<H>  4.1   |  27  |  0.82    Ca    10.2      04 Dec 2018 05:37  Phos  2.4     12-04  Mg     2.1     12-04          CAPILLARY BLOOD GLUCOSE      POCT Blood Glucose.: 209 mg/dL (04 Dec 2018 12:17)  POCT Blood Glucose.: 128 mg/dL (04 Dec 2018 08:28)  POCT Blood Glucose.: 140 mg/dL (03 Dec 2018 22:20)  POCT Blood Glucose.: 121 mg/dL (03 Dec 2018 17:17)      Lower Extremity Physical Exam:  Left foot with well demarcated gangrene to the forefoot primarily plantarly which is full thickness.  Dorsal skin is partial thickness intraepidermal bulla proximally under which shows some CR.  There are no signs of infection or collection.  No edema or erythema noted and no cellulitis.

## 2018-12-04 NOTE — PROGRESS NOTE ADULT - ASSESSMENT
Left foot gangrene  -Due to the extent of plantar skin necrosis whe will need a proximal amt with a long dorsal flap which is at this point questionable as to whether the skin is adequately operfused.  -Options are continued monitoring for reperfusion of the dorsal flap vs. HBO to assist the perfusion.  -Discussed options with patient and Dr. Lema  -Will obtain a HBE

## 2018-12-05 LAB
BUN SERPL-MCNC: 26 MG/DL — HIGH (ref 7–23)
CALCIUM SERPL-MCNC: 10.3 MG/DL — SIGNIFICANT CHANGE UP (ref 8.4–10.5)
CHLORIDE SERPL-SCNC: 98 MMOL/L — SIGNIFICANT CHANGE UP (ref 98–107)
CO2 SERPL-SCNC: 28 MMOL/L — SIGNIFICANT CHANGE UP (ref 22–31)
CREAT SERPL-MCNC: 0.8 MG/DL — SIGNIFICANT CHANGE UP (ref 0.5–1.3)
CRYOGLOB SERPL-MCNC: 0 — SIGNIFICANT CHANGE UP
GLUCOSE BLDC GLUCOMTR-MCNC: 114 MG/DL — HIGH (ref 70–99)
GLUCOSE BLDC GLUCOMTR-MCNC: 134 MG/DL — HIGH (ref 70–99)
GLUCOSE BLDC GLUCOMTR-MCNC: 140 MG/DL — HIGH (ref 70–99)
GLUCOSE BLDC GLUCOMTR-MCNC: 246 MG/DL — HIGH (ref 70–99)
GLUCOSE SERPL-MCNC: 100 MG/DL — HIGH (ref 70–99)
HCT VFR BLD CALC: 29.1 % — LOW (ref 34.5–45)
HGB BLD-MCNC: 9 G/DL — LOW (ref 11.5–15.5)
MAGNESIUM SERPL-MCNC: 2.1 MG/DL — SIGNIFICANT CHANGE UP (ref 1.6–2.6)
MCHC RBC-ENTMCNC: 24.3 PG — LOW (ref 27–34)
MCHC RBC-ENTMCNC: 30.9 % — LOW (ref 32–36)
MCV RBC AUTO: 78.4 FL — LOW (ref 80–100)
NRBC # FLD: 0 — SIGNIFICANT CHANGE UP
PHOSPHATE SERPL-MCNC: 2.8 MG/DL — SIGNIFICANT CHANGE UP (ref 2.5–4.5)
PLATELET # BLD AUTO: 621 K/UL — HIGH (ref 150–400)
PMV BLD: 8.9 FL — SIGNIFICANT CHANGE UP (ref 7–13)
POTASSIUM SERPL-MCNC: 4.2 MMOL/L — SIGNIFICANT CHANGE UP (ref 3.5–5.3)
POTASSIUM SERPL-SCNC: 4.2 MMOL/L — SIGNIFICANT CHANGE UP (ref 3.5–5.3)
RBC # BLD: 3.71 M/UL — LOW (ref 3.8–5.2)
RBC # FLD: 18.7 % — HIGH (ref 10.3–14.5)
SODIUM SERPL-SCNC: 136 MMOL/L — SIGNIFICANT CHANGE UP (ref 135–145)
WBC # BLD: 14.75 K/UL — HIGH (ref 3.8–10.5)
WBC # FLD AUTO: 14.75 K/UL — HIGH (ref 3.8–10.5)

## 2018-12-05 RX ADMIN — GABAPENTIN 200 MILLIGRAM(S): 400 CAPSULE ORAL at 15:27

## 2018-12-05 RX ADMIN — GABAPENTIN 200 MILLIGRAM(S): 400 CAPSULE ORAL at 23:00

## 2018-12-05 RX ADMIN — Medication 40 MILLIGRAM(S): at 05:44

## 2018-12-05 RX ADMIN — CILOSTAZOL 100 MILLIGRAM(S): 100 TABLET ORAL at 17:31

## 2018-12-05 RX ADMIN — LISINOPRIL 10 MILLIGRAM(S): 2.5 TABLET ORAL at 05:46

## 2018-12-05 RX ADMIN — CILOSTAZOL 100 MILLIGRAM(S): 100 TABLET ORAL at 05:42

## 2018-12-05 RX ADMIN — GABAPENTIN 200 MILLIGRAM(S): 400 CAPSULE ORAL at 05:42

## 2018-12-05 RX ADMIN — APIXABAN 5 MILLIGRAM(S): 2.5 TABLET, FILM COATED ORAL at 05:42

## 2018-12-05 RX ADMIN — Medication 2: at 12:47

## 2018-12-05 RX ADMIN — LIDOCAINE 1 PATCH: 4 CREAM TOPICAL at 07:00

## 2018-12-05 RX ADMIN — APIXABAN 5 MILLIGRAM(S): 2.5 TABLET, FILM COATED ORAL at 17:31

## 2018-12-05 NOTE — PROGRESS NOTE ADULT - ASSESSMENT
67 yo F w pmhx of T2DM, HTN presenting with 1 month of LLE swelling and discoloration admitted for  presumed cellulitis       seen by id and abs stopped  monitor off abx per ID as per id      ·  Problem: HTN (hypertension).   : BP well controlled  c/w meds      ·  Problem: DM (diabetes mellitus).    : Blood glucose well controlled  C/w sliding scale insulin.       ·  Problem: Microcytic anemia.    likely KRIS,   heme f/u noted    ·  Problem: Ischemia of LLE - off Heparin gtt, case discussed with vascular, empiric AC restarted w/Eliquis  Rheum and Heme appreciated, vasculitis/APLS w/u neg  stopped a/c as per/vasc/rheum  Derm appreciated, no role of repeat skin bx at this time  MRA LLE  done results noted  possibly trial of HBO per Podiatry    2nd opinion Rheum and Heme noted,and following  cont pletal  Steroids taper per Rheum    Adrenal incidenteloma - MRI reviewed, appears to be adrenal adenoma, Surg f/u      likely plan for amputation of toes or foot vs BKA LLE after complete demarcation  vascular to follow up for proposed sx    ipmn - GI appreciated, f/u CT outpt in 1 year    records sent to [facility], as daughter wishes pt to transfer there, but has been rejected    d/c planning if no inpt intervention by Podiatry/Vascular

## 2018-12-05 NOTE — PROGRESS NOTE ADULT - SUBJECTIVE AND OBJECTIVE BOX
CHIEF COMPLAINT:Patient is a 68y old  Female who presents with a chief complaint of Left foot cellulitis (01 Dec 2018 09:07)  no acute events    PAST MEDICAL & SURGICAL HISTORY:  DM (diabetes mellitus)  HTN (hypertension)  Breast CA  No significant past surgical history          REVIEW OF SYSTEMS:  CONSTITUTIONAL: No fever, weight loss, or fatigue  EYES: No eye pain, visual disturbances, or discharge  NECK: No pain or stiffness  RESPIRATORY: No cough, wheezing, chills or hemoptysis; No Shortness of Breath  CARDIOVASCULAR: No chest pain, palpitations, passing out, dizziness,  GASTROINTESTINAL: No abdominal or epigastric pain. No nausea, vomiting, or hematemesis; No diarrhea or constipation. No melena or hematochezia.  GENITOURINARY: No dysuria, frequency, hematuria, or incontinence  NEUROLOGICAL: No headaches  l foot pain controlled      Medications:  MEDICATIONS  (STANDING):  apixaban 5 milliGRAM(s) Oral every 12 hours  cilostazol 100 milliGRAM(s) Oral two times a day  dextrose 5%. 1000 milliLiter(s) (50 mL/Hr) IV Continuous <Continuous>  dextrose 50% Injectable 12.5 Gram(s) IV Push once  gabapentin 200 milliGRAM(s) Oral three times a day  insulin lispro (HumaLOG) corrective regimen sliding scale   SubCutaneous three times a day before meals  lidocaine   Patch 1 Patch Transdermal daily  lisinopril 10 milliGRAM(s) Oral daily  predniSONE   Tablet 40 milliGRAM(s) Oral daily  predniSONE   Tablet   Oral     MEDICATIONS  (PRN):  acetaminophen   Tablet .. 975 milliGRAM(s) Oral every 6 hours PRN Mild Pain (1 - 3)  acetaminophen   Tablet .. 650 milliGRAM(s) Oral every 6 hours PRN Temp greater or equal to 38C (100.4F)  dextrose 40% Gel 15 Gram(s) Oral once PRN Blood Glucose LESS THAN 70 milliGRAM(s)/deciliter  glucagon  Injectable 1 milliGRAM(s) IntraMuscular once PRN Glucose LESS THAN 70 milligrams/deciliter  oxyCODONE    IR 10 milliGRAM(s) Oral every 6 hours PRN Severe Pain (7 - 10)    	    PHYSICAL EXAM:  T(C): 37 (12-05-18 @ 05:37), Max: 37 (12-05-18 @ 05:37)  HR: 91 (12-05-18 @ 05:37) (91 - 96)  BP: 123/79 (12-05-18 @ 05:37) (122/86 - 125/82)  RR: 18 (12-05-18 @ 05:37) (18 - 18)  SpO2: 95% (12-05-18 @ 05:37) (95% - 96%)  Wt(kg): --  I&O's Summary    Appearance: Normal	  HEENT:   Normal oral mucosa, PERRL, EOMI	  Lymphatic: No lymphadenopathy  Cardiovascular: Normal S1 S2, No JVD, No murmurs  Respiratory: Lungs clear to auscultation	  Psychiatry: A & O x 3,  Gastrointestinal:  Soft, Non-tender, + BS		  Neurologic: Non-focal  Extremities:l   ischemic changes  / demarcating    LABS:	 	    CARDIAC MARKERS:                                9.0    14.75 )-----------( 621      ( 05 Dec 2018 06:00 )             29.1     12-05    136  |  98  |  26<H>  ----------------------------<  100<H>  4.2   |  28  |  0.80    Ca    10.3      05 Dec 2018 06:20  Phos  2.8     12-05  Mg     2.1     12-05      proBNP:   Lipid Profile:   HgA1c:   TSH:

## 2018-12-05 NOTE — PROGRESS NOTE ADULT - SUBJECTIVE AND OBJECTIVE BOX
RENATA DIXON:[id number],   68yFemale followed for:  No Known Allergies    PAST MEDICAL & SURGICAL HISTORY:  DM (diabetes mellitus)  HTN (hypertension)  Breast CA  No significant past surgical history    FAMILY HISTORY:  No pertinent family history in first degree relatives    MEDICATIONS  (STANDING):  apixaban 5 milliGRAM(s) Oral every 12 hours  cilostazol 100 milliGRAM(s) Oral two times a day  dextrose 5%. 1000 milliLiter(s) (50 mL/Hr) IV Continuous <Continuous>  dextrose 50% Injectable 12.5 Gram(s) IV Push once  gabapentin 200 milliGRAM(s) Oral three times a day  insulin lispro (HumaLOG) corrective regimen sliding scale   SubCutaneous three times a day before meals  lidocaine   Patch 1 Patch Transdermal daily  lisinopril 10 milliGRAM(s) Oral daily  predniSONE   Tablet 40 milliGRAM(s) Oral daily  predniSONE   Tablet   Oral     MEDICATIONS  (PRN):  acetaminophen   Tablet .. 975 milliGRAM(s) Oral every 6 hours PRN Mild Pain (1 - 3)  acetaminophen   Tablet .. 650 milliGRAM(s) Oral every 6 hours PRN Temp greater or equal to 38C (100.4F)  dextrose 40% Gel 15 Gram(s) Oral once PRN Blood Glucose LESS THAN 70 milliGRAM(s)/deciliter  glucagon  Injectable 1 milliGRAM(s) IntraMuscular once PRN Glucose LESS THAN 70 milligrams/deciliter  oxyCODONE    IR 10 milliGRAM(s) Oral every 6 hours PRN Severe Pain (7 - 10)      Vital Signs Last 24 Hrs  T(C): 37 (05 Dec 2018 05:37), Max: 37 (05 Dec 2018 05:37)  T(F): 98.6 (05 Dec 2018 05:37), Max: 98.6 (05 Dec 2018 05:37)  HR: 91 (05 Dec 2018 05:37) (91 - 96)  BP: 123/79 (05 Dec 2018 05:37) (122/86 - 125/82)  BP(mean): --  RR: 18 (05 Dec 2018 05:37) (18 - 18)  SpO2: 95% (05 Dec 2018 05:37) (95% - 96%)  nc/at  s1s2  cta  soft, nt, nd no guarding or rebound  no c/c/e    CBC Full  -  ( 05 Dec 2018 06:00 )  WBC Count : 14.75 K/uL  Hemoglobin : 9.0 g/dL  Hematocrit : 29.1 %  Platelet Count - Automated : 621 K/uL  Mean Cell Volume : 78.4 fL  Mean Cell Hemoglobin : 24.3 pg  Mean Cell Hemoglobin Concentration : 30.9 %  Auto Neutrophil # : x  Auto Lymphocyte # : x  Auto Monocyte # : x  Auto Eosinophil # : x  Auto Basophil # : x  Auto Neutrophil % : x  Auto Lymphocyte % : x  Auto Monocyte % : x  Auto Eosinophil % : x  Auto Basophil % : x    12-05    136  |  98  |  26<H>  ----------------------------<  100<H>  4.2   |  28  |  0.80    Ca    10.3      05 Dec 2018 06:20  Phos  2.8     12-05  Mg     2.1     12-05

## 2018-12-05 NOTE — CHART NOTE - NSCHARTNOTEFT_GEN_A_CORE
As per house podiatry patient will DC to [facility] and is to follow-up with wound care/podiatry clinic in order to obtain hyperbaric oxygen evaluation as outpatient - Number in DC paperwork  Rheumatology labs still pending and prednisone taper ordered - Can f/u as outpatient for results   [facility] auth pending

## 2018-12-06 LAB
BASOPHILS # BLD AUTO: 0.03 K/UL — SIGNIFICANT CHANGE UP (ref 0–0.2)
BASOPHILS NFR BLD AUTO: 0.2 % — SIGNIFICANT CHANGE UP (ref 0–2)
BUN SERPL-MCNC: 27 MG/DL — HIGH (ref 7–23)
CALCIUM SERPL-MCNC: 10.3 MG/DL — SIGNIFICANT CHANGE UP (ref 8.4–10.5)
CHLORIDE SERPL-SCNC: 98 MMOL/L — SIGNIFICANT CHANGE UP (ref 98–107)
CO2 SERPL-SCNC: 28 MMOL/L — SIGNIFICANT CHANGE UP (ref 22–31)
CREAT SERPL-MCNC: 0.89 MG/DL — SIGNIFICANT CHANGE UP (ref 0.5–1.3)
EOSINOPHIL # BLD AUTO: 0.13 K/UL — SIGNIFICANT CHANGE UP (ref 0–0.5)
EOSINOPHIL NFR BLD AUTO: 1 % — SIGNIFICANT CHANGE UP (ref 0–6)
GLUCOSE BLDC GLUCOMTR-MCNC: 133 MG/DL — HIGH (ref 70–99)
GLUCOSE BLDC GLUCOMTR-MCNC: 138 MG/DL — HIGH (ref 70–99)
GLUCOSE BLDC GLUCOMTR-MCNC: 168 MG/DL — HIGH (ref 70–99)
GLUCOSE BLDC GLUCOMTR-MCNC: 207 MG/DL — HIGH (ref 70–99)
GLUCOSE SERPL-MCNC: 113 MG/DL — HIGH (ref 70–99)
HCT VFR BLD CALC: 33.3 % — LOW (ref 34.5–45)
HGB BLD-MCNC: 10 G/DL — LOW (ref 11.5–15.5)
IMM GRANULOCYTES # BLD AUTO: 0.08 # — SIGNIFICANT CHANGE UP
IMM GRANULOCYTES NFR BLD AUTO: 0.6 % — SIGNIFICANT CHANGE UP (ref 0–1.5)
LYMPHOCYTES # BLD AUTO: 37.5 % — SIGNIFICANT CHANGE UP (ref 13–44)
LYMPHOCYTES # BLD AUTO: 5.1 K/UL — HIGH (ref 1–3.3)
MAGNESIUM SERPL-MCNC: 2.2 MG/DL — SIGNIFICANT CHANGE UP (ref 1.6–2.6)
MCHC RBC-ENTMCNC: 23.9 PG — LOW (ref 27–34)
MCHC RBC-ENTMCNC: 30 % — LOW (ref 32–36)
MCV RBC AUTO: 79.5 FL — LOW (ref 80–100)
MONOCYTES # BLD AUTO: 0.94 K/UL — HIGH (ref 0–0.9)
MONOCYTES NFR BLD AUTO: 6.9 % — SIGNIFICANT CHANGE UP (ref 2–14)
NEUTROPHILS # BLD AUTO: 7.33 K/UL — SIGNIFICANT CHANGE UP (ref 1.8–7.4)
NEUTROPHILS NFR BLD AUTO: 53.8 % — SIGNIFICANT CHANGE UP (ref 43–77)
NRBC # FLD: 0 — SIGNIFICANT CHANGE UP
PHOSPHATE SERPL-MCNC: 2.7 MG/DL — SIGNIFICANT CHANGE UP (ref 2.5–4.5)
PLATELET # BLD AUTO: 671 K/UL — HIGH (ref 150–400)
PMV BLD: 9.1 FL — SIGNIFICANT CHANGE UP (ref 7–13)
POTASSIUM SERPL-MCNC: 4.5 MMOL/L — SIGNIFICANT CHANGE UP (ref 3.5–5.3)
POTASSIUM SERPL-SCNC: 4.5 MMOL/L — SIGNIFICANT CHANGE UP (ref 3.5–5.3)
RBC # BLD: 4.19 M/UL — SIGNIFICANT CHANGE UP (ref 3.8–5.2)
RBC # FLD: 19.8 % — HIGH (ref 10.3–14.5)
SODIUM SERPL-SCNC: 138 MMOL/L — SIGNIFICANT CHANGE UP (ref 135–145)
WBC # BLD: 13.61 K/UL — HIGH (ref 3.8–10.5)
WBC # FLD AUTO: 13.61 K/UL — HIGH (ref 3.8–10.5)

## 2018-12-06 RX ADMIN — GABAPENTIN 200 MILLIGRAM(S): 400 CAPSULE ORAL at 14:18

## 2018-12-06 RX ADMIN — APIXABAN 5 MILLIGRAM(S): 2.5 TABLET, FILM COATED ORAL at 05:08

## 2018-12-06 RX ADMIN — APIXABAN 5 MILLIGRAM(S): 2.5 TABLET, FILM COATED ORAL at 17:45

## 2018-12-06 RX ADMIN — GABAPENTIN 200 MILLIGRAM(S): 400 CAPSULE ORAL at 05:10

## 2018-12-06 RX ADMIN — LISINOPRIL 10 MILLIGRAM(S): 2.5 TABLET ORAL at 05:09

## 2018-12-06 RX ADMIN — CILOSTAZOL 100 MILLIGRAM(S): 100 TABLET ORAL at 17:45

## 2018-12-06 RX ADMIN — CILOSTAZOL 100 MILLIGRAM(S): 100 TABLET ORAL at 05:10

## 2018-12-06 RX ADMIN — Medication 2: at 12:33

## 2018-12-06 RX ADMIN — GABAPENTIN 200 MILLIGRAM(S): 400 CAPSULE ORAL at 22:55

## 2018-12-06 RX ADMIN — Medication 40 MILLIGRAM(S): at 05:09

## 2018-12-06 NOTE — PROGRESS NOTE ADULT - ASSESSMENT
67 yo F w pmhx of T2DM, HTN presenting with 1 month of LLE swelling and discoloration admitted for  presumed cellulitis       seen by id and abs stopped  monitor off abx per ID as per id      ·  Problem: HTN (hypertension).   : BP well controlled  c/w meds      ·  Problem: DM (diabetes mellitus).    : Blood glucose well controlled  C/w sliding scale insulin.       ·  Problem: Microcytic anemia.    likely KRIS,   heme f/u noted    ·  Problem: Ischemia of LLE - off Heparin gtt, case discussed with vascular, empiric AC restarted w/Eliquis  Rheum and Heme appreciated, vasculitis/APLS w/u neg  stopped a/c as per/vasc/rheum  Derm appreciated, no role of repeat skin bx at this time  MRA LLE  done results noted  possibly trial of HBO per Podiatry    2nd opinion Rheum and Heme noted,and following  cont pletal  Steroids taper per Rheum    Adrenal incidenteloma - MRI reviewed, appears to be adrenal adenoma, Surg f/u      likely plan for amputation of toes or foot vs BKA LLE after complete demarcation  vascular to follow up for proposed sx    ipmn - GI appreciated, f/u CT outpt in 1 year    records sent to [facility], as daughter wishes pt to transfer there, but has been rejected    d/c planning to [facility] w/HBO tx as outpt

## 2018-12-06 NOTE — PROGRESS NOTE ADULT - SUBJECTIVE AND OBJECTIVE BOX
CHIEF COMPLAINT:Patient is a 68y old  Female who presents with a chief complaint of Left foot cellulitis (01 Dec 2018 09:07)  no acute events    PAST MEDICAL & SURGICAL HISTORY:  DM (diabetes mellitus)  HTN (hypertension)  Breast CA  No significant past surgical history          REVIEW OF SYSTEMS:  CONSTITUTIONAL: No fever, weight loss, or fatigue  EYES: No eye pain, visual disturbances, or discharge  NECK: No pain or stiffness  RESPIRATORY: No cough, wheezing, chills or hemoptysis; No Shortness of Breath  CARDIOVASCULAR: No chest pain, palpitations, passing out, dizziness,  GASTROINTESTINAL: No abdominal or epigastric pain. No nausea, vomiting, or hematemesis; No diarrhea or constipation. No melena or hematochezia.  GENITOURINARY: No dysuria, frequency, hematuria, or incontinence  NEUROLOGICAL: No headaches  l foot pain controlled      Medications:  MEDICATIONS  (STANDING):  apixaban 5 milliGRAM(s) Oral every 12 hours  cilostazol 100 milliGRAM(s) Oral two times a day  dextrose 5%. 1000 milliLiter(s) (50 mL/Hr) IV Continuous <Continuous>  dextrose 50% Injectable 12.5 Gram(s) IV Push once  gabapentin 200 milliGRAM(s) Oral three times a day  insulin lispro (HumaLOG) corrective regimen sliding scale   SubCutaneous three times a day before meals  lidocaine   Patch 1 Patch Transdermal daily  lisinopril 10 milliGRAM(s) Oral daily  predniSONE   Tablet 40 milliGRAM(s) Oral daily  predniSONE   Tablet   Oral     MEDICATIONS  (PRN):  acetaminophen   Tablet .. 975 milliGRAM(s) Oral every 6 hours PRN Mild Pain (1 - 3)  acetaminophen   Tablet .. 650 milliGRAM(s) Oral every 6 hours PRN Temp greater or equal to 38C (100.4F)  dextrose 40% Gel 15 Gram(s) Oral once PRN Blood Glucose LESS THAN 70 milliGRAM(s)/deciliter  glucagon  Injectable 1 milliGRAM(s) IntraMuscular once PRN Glucose LESS THAN 70 milligrams/deciliter  oxyCODONE    IR 10 milliGRAM(s) Oral every 6 hours PRN Severe Pain (7 - 10)    	    PHYSICAL EXAM:  T(C): 36.8 (12-06-18 @ 05:03), Max: 36.9 (12-05-18 @ 21:22)  HR: 90 (12-06-18 @ 05:03) (85 - 102)  BP: 121/75 (12-06-18 @ 05:03) (120/85 - 122/77)  RR: 18 (12-06-18 @ 05:03) (18 - 18)  SpO2: 95% (12-06-18 @ 05:03) (94% - 97%)  Wt(kg): --  I&O's Summary    Appearance: Normal	  HEENT:   Normal oral mucosa, PERRL, EOMI	  Lymphatic: No lymphadenopathy  Cardiovascular: Normal S1 S2, No JVD, No murmurs  Respiratory: Lungs clear to auscultation	  Psychiatry: A & O x 3,  Gastrointestinal:  Soft, Non-tender, + BS		  Neurologic: Non-focal  Extremities:l   ischemic changes  / demarcating    LABS:	 	    CARDIAC MARKERS:                                10.0   13.61 )-----------( 671      ( 06 Dec 2018 05:30 )             33.3     12-06    138  |  98  |  27<H>  ----------------------------<  113<H>  4.5   |  28  |  0.89    Ca    10.3      06 Dec 2018 05:30  Phos  2.7     12-06  Mg     2.2     12-06      proBNP:   Lipid Profile:   HgA1c:   TSH:

## 2018-12-06 NOTE — PROGRESS NOTE ADULT - SUBJECTIVE AND OBJECTIVE BOX
RENATA DIXON:[id number],   68yFemale followed for:  No Known Allergies    PAST MEDICAL & SURGICAL HISTORY:  DM (diabetes mellitus)  HTN (hypertension)  Breast CA  No significant past surgical history    FAMILY HISTORY:  No pertinent family history in first degree relatives    MEDICATIONS  (STANDING):  apixaban 5 milliGRAM(s) Oral every 12 hours  cilostazol 100 milliGRAM(s) Oral two times a day  dextrose 5%. 1000 milliLiter(s) (50 mL/Hr) IV Continuous <Continuous>  dextrose 50% Injectable 12.5 Gram(s) IV Push once  gabapentin 200 milliGRAM(s) Oral three times a day  insulin lispro (HumaLOG) corrective regimen sliding scale   SubCutaneous three times a day before meals  lidocaine   Patch 1 Patch Transdermal daily  lisinopril 10 milliGRAM(s) Oral daily  predniSONE   Tablet 40 milliGRAM(s) Oral daily  predniSONE   Tablet   Oral     MEDICATIONS  (PRN):  acetaminophen   Tablet .. 975 milliGRAM(s) Oral every 6 hours PRN Mild Pain (1 - 3)  acetaminophen   Tablet .. 650 milliGRAM(s) Oral every 6 hours PRN Temp greater or equal to 38C (100.4F)  dextrose 40% Gel 15 Gram(s) Oral once PRN Blood Glucose LESS THAN 70 milliGRAM(s)/deciliter  glucagon  Injectable 1 milliGRAM(s) IntraMuscular once PRN Glucose LESS THAN 70 milligrams/deciliter  oxyCODONE    IR 10 milliGRAM(s) Oral every 6 hours PRN Severe Pain (7 - 10)      Vital Signs Last 24 Hrs  T(C): 36.8 (06 Dec 2018 05:03), Max: 36.9 (05 Dec 2018 21:22)  T(F): 98.3 (06 Dec 2018 05:03), Max: 98.5 (05 Dec 2018 21:22)  HR: 90 (06 Dec 2018 05:03) (85 - 102)  BP: 121/75 (06 Dec 2018 05:03) (120/85 - 122/77)  BP(mean): --  RR: 18 (06 Dec 2018 05:03) (18 - 18)  SpO2: 95% (06 Dec 2018 05:03) (94% - 97%)  nc/at  s1s2  cta  soft, nt, nd no guarding or rebound  no c/c/e    CBC Full  -  ( 06 Dec 2018 05:30 )  WBC Count : 13.61 K/uL  Hemoglobin : 10.0 g/dL  Hematocrit : 33.3 %  Platelet Count - Automated : 671 K/uL  Mean Cell Volume : 79.5 fL  Mean Cell Hemoglobin : 23.9 pg  Mean Cell Hemoglobin Concentration : 30.0 %  Auto Neutrophil # : 7.33 K/uL  Auto Lymphocyte # : 5.10 K/uL  Auto Monocyte # : 0.94 K/uL  Auto Eosinophil # : 0.13 K/uL  Auto Basophil # : 0.03 K/uL  Auto Neutrophil % : 53.8 %  Auto Lymphocyte % : 37.5 %  Auto Monocyte % : 6.9 %  Auto Eosinophil % : 1.0 %  Auto Basophil % : 0.2 %    12-06    138  |  98  |  27<H>  ----------------------------<  113<H>  4.5   |  28  |  0.89    Ca    10.3      06 Dec 2018 05:30  Phos  2.7     12-06  Mg     2.2     12-06

## 2018-12-07 LAB
GLUCOSE BLDC GLUCOMTR-MCNC: 135 MG/DL — HIGH (ref 70–99)
GLUCOSE BLDC GLUCOMTR-MCNC: 158 MG/DL — HIGH (ref 70–99)
GLUCOSE BLDC GLUCOMTR-MCNC: 161 MG/DL — HIGH (ref 70–99)
GLUCOSE BLDC GLUCOMTR-MCNC: 231 MG/DL — HIGH (ref 70–99)

## 2018-12-07 PROCEDURE — 99231 SBSQ HOSP IP/OBS SF/LOW 25: CPT

## 2018-12-07 RX ADMIN — Medication 40 MILLIGRAM(S): at 06:42

## 2018-12-07 RX ADMIN — GABAPENTIN 200 MILLIGRAM(S): 400 CAPSULE ORAL at 21:58

## 2018-12-07 RX ADMIN — Medication 2: at 12:34

## 2018-12-07 RX ADMIN — LISINOPRIL 10 MILLIGRAM(S): 2.5 TABLET ORAL at 06:43

## 2018-12-07 RX ADMIN — CILOSTAZOL 100 MILLIGRAM(S): 100 TABLET ORAL at 06:42

## 2018-12-07 RX ADMIN — Medication 1: at 17:53

## 2018-12-07 RX ADMIN — CILOSTAZOL 100 MILLIGRAM(S): 100 TABLET ORAL at 17:53

## 2018-12-07 RX ADMIN — APIXABAN 5 MILLIGRAM(S): 2.5 TABLET, FILM COATED ORAL at 06:42

## 2018-12-07 RX ADMIN — GABAPENTIN 200 MILLIGRAM(S): 400 CAPSULE ORAL at 12:35

## 2018-12-07 RX ADMIN — GABAPENTIN 200 MILLIGRAM(S): 400 CAPSULE ORAL at 06:42

## 2018-12-07 RX ADMIN — APIXABAN 5 MILLIGRAM(S): 2.5 TABLET, FILM COATED ORAL at 17:53

## 2018-12-07 NOTE — PROGRESS NOTE ADULT - SUBJECTIVE AND OBJECTIVE BOX
RENATA DIXON:[id number],   68yFemale followed for:  No Known Allergies    PAST MEDICAL & SURGICAL HISTORY:  DM (diabetes mellitus)  HTN (hypertension)  Breast CA  No significant past surgical history    FAMILY HISTORY:  No pertinent family history in first degree relatives    MEDICATIONS  (STANDING):  apixaban 5 milliGRAM(s) Oral every 12 hours  cilostazol 100 milliGRAM(s) Oral two times a day  dextrose 5%. 1000 milliLiter(s) (50 mL/Hr) IV Continuous <Continuous>  dextrose 50% Injectable 12.5 Gram(s) IV Push once  gabapentin 200 milliGRAM(s) Oral three times a day  insulin lispro (HumaLOG) corrective regimen sliding scale   SubCutaneous three times a day before meals  lidocaine   Patch 1 Patch Transdermal daily  lisinopril 10 milliGRAM(s) Oral daily  predniSONE   Tablet 40 milliGRAM(s) Oral daily  predniSONE   Tablet   Oral     MEDICATIONS  (PRN):  acetaminophen   Tablet .. 975 milliGRAM(s) Oral every 6 hours PRN Mild Pain (1 - 3)  acetaminophen   Tablet .. 650 milliGRAM(s) Oral every 6 hours PRN Temp greater or equal to 38C (100.4F)  dextrose 40% Gel 15 Gram(s) Oral once PRN Blood Glucose LESS THAN 70 milliGRAM(s)/deciliter  glucagon  Injectable 1 milliGRAM(s) IntraMuscular once PRN Glucose LESS THAN 70 milligrams/deciliter      Vital Signs Last 24 Hrs  T(C): 37.2 (07 Dec 2018 06:39), Max: 37.2 (07 Dec 2018 06:39)  T(F): 99 (07 Dec 2018 06:39), Max: 99 (07 Dec 2018 06:39)  HR: 90 (07 Dec 2018 06:39) (90 - 106)  BP: 126/82 (07 Dec 2018 06:39) (111/73 - 126/82)  BP(mean): --  RR: 17 (07 Dec 2018 06:39) (17 - 18)  SpO2: 97% (07 Dec 2018 06:39) (97% - 99%)  nc/at  s1s2  cta  soft, nt, nd no guarding or rebound  no c/c/e    CBC Full  -  ( 06 Dec 2018 05:30 )  WBC Count : 13.61 K/uL  Hemoglobin : 10.0 g/dL  Hematocrit : 33.3 %  Platelet Count - Automated : 671 K/uL  Mean Cell Volume : 79.5 fL  Mean Cell Hemoglobin : 23.9 pg  Mean Cell Hemoglobin Concentration : 30.0 %  Auto Neutrophil # : 7.33 K/uL  Auto Lymphocyte # : 5.10 K/uL  Auto Monocyte # : 0.94 K/uL  Auto Eosinophil # : 0.13 K/uL  Auto Basophil # : 0.03 K/uL  Auto Neutrophil % : 53.8 %  Auto Lymphocyte % : 37.5 %  Auto Monocyte % : 6.9 %  Auto Eosinophil % : 1.0 %  Auto Basophil % : 0.2 %    12-06    138  |  98  |  27<H>  ----------------------------<  113<H>  4.5   |  28  |  0.89    Ca    10.3      06 Dec 2018 05:30  Phos  2.7     12-06  Mg     2.2     12-06

## 2018-12-07 NOTE — PROGRESS NOTE ADULT - SUBJECTIVE AND OBJECTIVE BOX
CHIEF COMPLAINT:Patient is a 68y old  Female who presents with a chief complaint of Left foot cellulitis (01 Dec 2018 09:07)  no acute events    PAST MEDICAL & SURGICAL HISTORY:  DM (diabetes mellitus)  HTN (hypertension)  Breast CA  No significant past surgical history          REVIEW OF SYSTEMS:  CONSTITUTIONAL: No fever, weight loss, or fatigue  EYES: No eye pain, visual disturbances, or discharge  NECK: No pain or stiffness  RESPIRATORY: No cough, wheezing, chills or hemoptysis; No Shortness of Breath  CARDIOVASCULAR: No chest pain, palpitations, passing out, dizziness,  GASTROINTESTINAL: No abdominal or epigastric pain. No nausea, vomiting, or hematemesis; No diarrhea or constipation. No melena or hematochezia.  GENITOURINARY: No dysuria, frequency, hematuria, or incontinence  NEUROLOGICAL: No headaches  l foot pain controlled      Medications:  MEDICATIONS  (STANDING):  apixaban 5 milliGRAM(s) Oral every 12 hours  cilostazol 100 milliGRAM(s) Oral two times a day  dextrose 5%. 1000 milliLiter(s) (50 mL/Hr) IV Continuous <Continuous>  dextrose 50% Injectable 12.5 Gram(s) IV Push once  gabapentin 200 milliGRAM(s) Oral three times a day  insulin lispro (HumaLOG) corrective regimen sliding scale   SubCutaneous three times a day before meals  lidocaine   Patch 1 Patch Transdermal daily  lisinopril 10 milliGRAM(s) Oral daily  predniSONE   Tablet 40 milliGRAM(s) Oral daily  predniSONE   Tablet   Oral     MEDICATIONS  (PRN):  acetaminophen   Tablet .. 975 milliGRAM(s) Oral every 6 hours PRN Mild Pain (1 - 3)  acetaminophen   Tablet .. 650 milliGRAM(s) Oral every 6 hours PRN Temp greater or equal to 38C (100.4F)  dextrose 40% Gel 15 Gram(s) Oral once PRN Blood Glucose LESS THAN 70 milliGRAM(s)/deciliter  glucagon  Injectable 1 milliGRAM(s) IntraMuscular once PRN Glucose LESS THAN 70 milligrams/deciliter    	    PHYSICAL EXAM:  T(C): 37.2 (12-07-18 @ 06:39), Max: 37.2 (12-07-18 @ 06:39)  HR: 90 (12-07-18 @ 06:39) (90 - 106)  BP: 126/82 (12-07-18 @ 06:39) (111/73 - 126/82)  RR: 17 (12-07-18 @ 06:39) (17 - 18)  SpO2: 97% (12-07-18 @ 06:39) (97% - 99%)  Wt(kg): --  I&O's Summary      Appearance: Normal	  HEENT:   Normal oral mucosa, PERRL, EOMI	  Lymphatic: No lymphadenopathy  Cardiovascular: Normal S1 S2, No JVD, No murmurs  Respiratory: Lungs clear to auscultation	  Psychiatry: A & O x 3,  Gastrointestinal:  Soft, Non-tender, + BS		  Neurologic: Non-focal  Extremities:l   ischemic changes  / demarcating    LABS:	 	    CARDIAC MARKERS:                                10.0   13.61 )-----------( 671      ( 06 Dec 2018 05:30 )             33.3     12-06    138  |  98  |  27<H>  ----------------------------<  113<H>  4.5   |  28  |  0.89    Ca    10.3      06 Dec 2018 05:30  Phos  2.7     12-06  Mg     2.2     12-06      proBNP:   Lipid Profile:   HgA1c:   TSH:

## 2018-12-07 NOTE — PROGRESS NOTE ADULT - SUBJECTIVE AND OBJECTIVE BOX
FU L foot isch  Comfortable  Pain controlled  L LE: foot/toes isch/dry gang cont to demarcate  No prox progression.  No gross infection  +PT pulse    A/P:   L foot isch  Atypical presentation    REc:   Cont med mgmt  AC  fu w podiatry recs: await full demarcation/optimize mgmt for partial foot slavage (HBO? etc)  If uns[facility]kle then will req [facility]  DW'ed pt (Pt declined offer to call/update her daughter this kellee) Seen ~21:00  FU L foot isch  Comfortable  Pain controlled  L LE: foot/toes isch/dry gang cont to demarcate  No prox progression.  No gross infection  +PT pulse    A/P:   L foot isch  Atypical presentation    REc:   Cont med mgmt  AC  fu w podiatry recs: await full demarcation/optimize mgmt for partial foot slavage (HBO? etc)  If unsalvageabkle then will req [facility]  DW'ed pt (Pt declined offer to call/update her daughter this kellee)

## 2018-12-07 NOTE — PROGRESS NOTE ADULT - ASSESSMENT
67 yo F w pmhx of T2DM, HTN presenting with 1 month of LLE swelling and discoloration admitted for  presumed cellulitis       seen by id and abs stopped  monitor off abx per ID as per id      ·  Problem: HTN (hypertension).   : BP well controlled  c/w meds      ·  Problem: DM (diabetes mellitus).    : Blood glucose well controlled  C/w sliding scale insulin.       ·  Problem: Microcytic anemia.    likely KRIS,   heme f/u noted    ·  Problem: Ischemia of LLE - off Heparin gtt, case discussed with vascular, empiric AC restarted w/Eliquis  Rheum and Heme appreciated, vasculitis/APLS w/u neg  stopped a/c as per/vasc/rheum  Derm appreciated, no role of repeat skin bx at this time  MRA LLE  done results noted  possibly trial of HBO per Podiatry    2nd opinion Rheum and Heme noted,and following  cont pletal  Steroids taper per Rheum    Adrenal incidenteloma - MRI reviewed, appears to be adrenal adenoma, Surg f/u      likely plan for amputation of toes or foot vs BKA LLE after complete demarcation  vascular to follow up for proposed sx    ipmn - GI appreciated, f/u CT outpt in 1 year    records sent to [facility], as daughter wishes pt to transfer there, but has been rejected    d/c planning to [facility] w/HBO tx as outpt  above discussed w/daughter over the phone

## 2018-12-08 LAB
GLUCOSE BLDC GLUCOMTR-MCNC: 117 MG/DL — HIGH (ref 70–99)
GLUCOSE BLDC GLUCOMTR-MCNC: 159 MG/DL — HIGH (ref 70–99)
GLUCOSE BLDC GLUCOMTR-MCNC: 228 MG/DL — HIGH (ref 70–99)
GLUCOSE BLDC GLUCOMTR-MCNC: 236 MG/DL — HIGH (ref 70–99)

## 2018-12-08 RX ADMIN — Medication 2: at 17:33

## 2018-12-08 RX ADMIN — APIXABAN 5 MILLIGRAM(S): 2.5 TABLET, FILM COATED ORAL at 05:30

## 2018-12-08 RX ADMIN — GABAPENTIN 200 MILLIGRAM(S): 400 CAPSULE ORAL at 05:30

## 2018-12-08 RX ADMIN — APIXABAN 5 MILLIGRAM(S): 2.5 TABLET, FILM COATED ORAL at 17:04

## 2018-12-08 RX ADMIN — Medication 1: at 08:50

## 2018-12-08 RX ADMIN — GABAPENTIN 200 MILLIGRAM(S): 400 CAPSULE ORAL at 21:51

## 2018-12-08 RX ADMIN — Medication 2: at 12:39

## 2018-12-08 RX ADMIN — CILOSTAZOL 100 MILLIGRAM(S): 100 TABLET ORAL at 05:30

## 2018-12-08 RX ADMIN — LISINOPRIL 10 MILLIGRAM(S): 2.5 TABLET ORAL at 05:30

## 2018-12-08 RX ADMIN — GABAPENTIN 200 MILLIGRAM(S): 400 CAPSULE ORAL at 15:29

## 2018-12-08 RX ADMIN — Medication 40 MILLIGRAM(S): at 05:30

## 2018-12-08 RX ADMIN — CILOSTAZOL 100 MILLIGRAM(S): 100 TABLET ORAL at 17:04

## 2018-12-08 NOTE — PROGRESS NOTE ADULT - ASSESSMENT
69 yo F w pmhx of T2DM, HTN presenting with 1 month of LLE swelling and discoloration admitted for  presumed cellulitis       seen by id and abs stopped  monitor off abx per ID as per id      ·  Problem: HTN (hypertension).   : BP well controlled  c/w meds      ·  Problem: DM (diabetes mellitus).    : Blood glucose well controlled  C/w sliding scale insulin.       ·  Problem: Microcytic anemia.    likely KRIS,   heme f/u noted    ·  Problem: Ischemia of LLE - off Heparin gtt, case discussed with vascular, empiric AC restarted w/Eliquis  Rheum and Heme appreciated, vasculitis/APLS w/u neg  stopped a/c as per/vasc/rheum  Derm appreciated, no role of repeat skin bx at this time  MRA LLE  done results noted  possibly trial of HBO per Podiatry    2nd opinion Rheum and Heme noted,and following  cont pletal  Steroids taper per Rheum    Adrenal incidenteloma - MRI reviewed, appears to be adrenal adenoma, Surg f/u      likely plan for amputation of toes or foot vs BKA LLE after complete demarcation  vascular to follow up for proposed sx    ipmn - GI appreciated, f/u CT outpt in 1 year    records sent to [facility], as daughter wishes pt to transfer there, but has been rejected    d/c planning to [facility] w/HBO tx as outpt pending insurance auth  above discussed w/daughter at bedside

## 2018-12-08 NOTE — PROGRESS NOTE ADULT - SUBJECTIVE AND OBJECTIVE BOX
RENATA DIXON:[id number],   68yFemale followed for:  No Known Allergies    PAST MEDICAL & SURGICAL HISTORY:  DM (diabetes mellitus)  HTN (hypertension)  Breast CA  No significant past surgical history    FAMILY HISTORY:  No pertinent family history in first degree relatives    MEDICATIONS  (STANDING):  apixaban 5 milliGRAM(s) Oral every 12 hours  cilostazol 100 milliGRAM(s) Oral two times a day  dextrose 5%. 1000 milliLiter(s) (50 mL/Hr) IV Continuous <Continuous>  dextrose 50% Injectable 12.5 Gram(s) IV Push once  gabapentin 200 milliGRAM(s) Oral three times a day  insulin lispro (HumaLOG) corrective regimen sliding scale   SubCutaneous three times a day before meals  lidocaine   Patch 1 Patch Transdermal daily  lisinopril 10 milliGRAM(s) Oral daily  predniSONE   Tablet   Oral     MEDICATIONS  (PRN):  acetaminophen   Tablet .. 975 milliGRAM(s) Oral every 6 hours PRN Mild Pain (1 - 3)  acetaminophen   Tablet .. 650 milliGRAM(s) Oral every 6 hours PRN Temp greater or equal to 38C (100.4F)  dextrose 40% Gel 15 Gram(s) Oral once PRN Blood Glucose LESS THAN 70 milliGRAM(s)/deciliter  glucagon  Injectable 1 milliGRAM(s) IntraMuscular once PRN Glucose LESS THAN 70 milligrams/deciliter      Vital Signs Last 24 Hrs  T(C): 36.9 (08 Dec 2018 04:58), Max: 37.3 (07 Dec 2018 21:56)  T(F): 98.5 (08 Dec 2018 04:58), Max: 99.1 (07 Dec 2018 21:56)  HR: 86 (08 Dec 2018 04:58) (86 - 117)  BP: 122/77 (08 Dec 2018 04:58) (109/59 - 122/77)  BP(mean): --  RR: 18 (08 Dec 2018 04:58) (18 - 20)  SpO2: 99% (08 Dec 2018 04:58) (95% - 99%)  nc/at  s1s2  cta  soft, nt, nd no guarding or rebound  no c/c/e

## 2018-12-08 NOTE — PROGRESS NOTE ADULT - ASSESSMENT
68 year old F with Left foot gangrene  -Pt seen and evaluated bedside  - Left foot gangrene stable, no clinical signs of infection.   - Due to extent of plantar skin necrosis proximal TMA is not a option at this time. Discussed with patient in-depth (30 minutes) options and practicality of going to HBO 5X a week and continued wound care will low odds of salvageability of foot vs below knee amp. Pt voiced she would be interested in possibly having BKA in attempt to improve quality of life. She will discuss with family. 68 year old F with Left foot gangrene  -Pt seen and evaluated bedside  - Left foot gangrene stable, no clinical signs of infection.   - Due to extent of plantar skin necrosis proximal TMA is not a option at this time. Discussed with patient in-depth (30 minutes) options and practicality of going to HBO 5X a week and continued wound care will low odds of salvageability of foot vs below knee amp. Pt voiced she would be interested in possibly having BKA in attempt to improve quality of life. She will discuss with family.   - No pod surgical intervention at this time.   - Dressed with Xeroform and DSD.

## 2018-12-08 NOTE — PROGRESS NOTE ADULT - SUBJECTIVE AND OBJECTIVE BOX
Patient is a 68y old  Female who presents with a chief complaint of Left foot cellulitis (08 Dec 2018 09:31)       INTERVAL HPI/OVERNIGHT EVENTS:  Patient seen and evaluated at bedside.  Pt is resting comfortable in NAD. Denies N/V/F/C.      Allergies    No Known Allergies    Intolerances        Vital Signs Last 24 Hrs  T(C): 36.9 (08 Dec 2018 04:58), Max: 37.3 (07 Dec 2018 21:56)  T(F): 98.5 (08 Dec 2018 04:58), Max: 99.1 (07 Dec 2018 21:56)  HR: 86 (08 Dec 2018 04:58) (86 - 117)  BP: 122/77 (08 Dec 2018 04:58) (109/59 - 122/77)  BP(mean): --  RR: 18 (08 Dec 2018 04:58) (18 - 20)  SpO2: 99% (08 Dec 2018 04:58) (95% - 99%)    LABS:              CAPILLARY BLOOD GLUCOSE      POCT Blood Glucose.: 159 mg/dL (08 Dec 2018 08:43)  POCT Blood Glucose.: 158 mg/dL (07 Dec 2018 22:29)  POCT Blood Glucose.: 161 mg/dL (07 Dec 2018 17:14)  POCT Blood Glucose.: 231 mg/dL (07 Dec 2018 12:21)      Lower Extremity Physical Exam:  Left foot with well demarcated gangrene to the forefoot primarily plantarly which is full thickness.  Dorsal skin is partial thickness intraepidermal bulla proximally under which shows some CR. There are no signs of infection or collection.  No edema or erythema noted and no cellulitis.

## 2018-12-08 NOTE — PROGRESS NOTE ADULT - SUBJECTIVE AND OBJECTIVE BOX
CHIEF COMPLAINT:Patient is a 68y old  Female who presents with a chief complaint of Left foot cellulitis (01 Dec 2018 09:07)  no acute events    PAST MEDICAL & SURGICAL HISTORY:  DM (diabetes mellitus)  HTN (hypertension)  Breast CA  No significant past surgical history          REVIEW OF SYSTEMS:  CONSTITUTIONAL: No fever, weight loss, or fatigue  EYES: No eye pain, visual disturbances, or discharge  NECK: No pain or stiffness  RESPIRATORY: No cough, wheezing, chills or hemoptysis; No Shortness of Breath  CARDIOVASCULAR: No chest pain, palpitations, passing out, dizziness,  GASTROINTESTINAL: No abdominal or epigastric pain. No nausea, vomiting, or hematemesis; No diarrhea or constipation. No melena or hematochezia.  GENITOURINARY: No dysuria, frequency, hematuria, or incontinence  NEUROLOGICAL: No headaches  l foot pain controlled      Medications:  MEDICATIONS  (STANDING):  apixaban 5 milliGRAM(s) Oral every 12 hours  cilostazol 100 milliGRAM(s) Oral two times a day  dextrose 5%. 1000 milliLiter(s) (50 mL/Hr) IV Continuous <Continuous>  dextrose 50% Injectable 12.5 Gram(s) IV Push once  gabapentin 200 milliGRAM(s) Oral three times a day  insulin lispro (HumaLOG) corrective regimen sliding scale   SubCutaneous three times a day before meals  lidocaine   Patch 1 Patch Transdermal daily  lisinopril 10 milliGRAM(s) Oral daily  predniSONE   Tablet   Oral     MEDICATIONS  (PRN):  acetaminophen   Tablet .. 975 milliGRAM(s) Oral every 6 hours PRN Mild Pain (1 - 3)  acetaminophen   Tablet .. 650 milliGRAM(s) Oral every 6 hours PRN Temp greater or equal to 38C (100.4F)  dextrose 40% Gel 15 Gram(s) Oral once PRN Blood Glucose LESS THAN 70 milliGRAM(s)/deciliter  glucagon  Injectable 1 milliGRAM(s) IntraMuscular once PRN Glucose LESS THAN 70 milligrams/deciliter    	    PHYSICAL EXAM:  T(C): 36.9 (12-08-18 @ 04:58), Max: 37.3 (12-07-18 @ 21:56)  HR: 86 (12-08-18 @ 04:58) (86 - 117)  BP: 122/77 (12-08-18 @ 04:58) (109/59 - 122/77)  RR: 18 (12-08-18 @ 04:58) (18 - 20)  SpO2: 99% (12-08-18 @ 04:58) (95% - 99%)  Wt(kg): --  I&O's Summary    Appearance: Normal	  HEENT:   Normal oral mucosa, PERRL, EOMI	  Lymphatic: No lymphadenopathy  Cardiovascular: Normal S1 S2, No JVD, No murmurs  Respiratory: Lungs clear to auscultation	  Psychiatry: A & O x 3,  Gastrointestinal:  Soft, Non-tender, + BS		  Neurologic: Non-focal  Extremities:l   ischemic changes  / demarcating    LABS:	 	    CARDIAC MARKERS:                  proBNP:   Lipid Profile:   HgA1c:   TSH:

## 2018-12-09 LAB
GLUCOSE BLDC GLUCOMTR-MCNC: 122 MG/DL — HIGH (ref 70–99)
GLUCOSE BLDC GLUCOMTR-MCNC: 131 MG/DL — HIGH (ref 70–99)
GLUCOSE BLDC GLUCOMTR-MCNC: 139 MG/DL — HIGH (ref 70–99)
GLUCOSE BLDC GLUCOMTR-MCNC: 220 MG/DL — HIGH (ref 70–99)

## 2018-12-09 RX ORDER — DOCUSATE SODIUM 100 MG
100 CAPSULE ORAL THREE TIMES A DAY
Qty: 0 | Refills: 0 | Status: DISCONTINUED | OUTPATIENT
Start: 2018-12-09 | End: 2018-12-14

## 2018-12-09 RX ADMIN — GABAPENTIN 200 MILLIGRAM(S): 400 CAPSULE ORAL at 21:32

## 2018-12-09 RX ADMIN — LISINOPRIL 10 MILLIGRAM(S): 2.5 TABLET ORAL at 05:45

## 2018-12-09 RX ADMIN — CILOSTAZOL 100 MILLIGRAM(S): 100 TABLET ORAL at 05:45

## 2018-12-09 RX ADMIN — GABAPENTIN 200 MILLIGRAM(S): 400 CAPSULE ORAL at 05:45

## 2018-12-09 RX ADMIN — APIXABAN 5 MILLIGRAM(S): 2.5 TABLET, FILM COATED ORAL at 05:45

## 2018-12-09 RX ADMIN — GABAPENTIN 200 MILLIGRAM(S): 400 CAPSULE ORAL at 16:13

## 2018-12-09 RX ADMIN — Medication 2: at 12:43

## 2018-12-09 RX ADMIN — Medication 100 MILLIGRAM(S): at 17:00

## 2018-12-09 RX ADMIN — CILOSTAZOL 100 MILLIGRAM(S): 100 TABLET ORAL at 17:00

## 2018-12-09 RX ADMIN — APIXABAN 5 MILLIGRAM(S): 2.5 TABLET, FILM COATED ORAL at 17:00

## 2018-12-09 RX ADMIN — Medication 30 MILLIGRAM(S): at 05:44

## 2018-12-09 NOTE — PROGRESS NOTE ADULT - ASSESSMENT
69 yo F w pmhx of T2DM, HTN presenting with 1 month of LLE swelling and discoloration admitted for  presumed cellulitis       seen by id and abs stopped  monitor off abx per ID as per id      ·  Problem: HTN (hypertension).   : BP well controlled  c/w meds      ·  Problem: DM (diabetes mellitus).    : Blood glucose well controlled  C/w sliding scale insulin.       ·  Problem: Microcytic anemia.    likely KRIS,   heme f/u noted    ·  Problem: Ischemia of LLE - off Heparin gtt, case discussed with vascular, empiric AC restarted w/Eliquis  Rheum and Heme appreciated, vasculitis/APLS w/u neg  stopped a/c as per/vasc/rheum  Derm appreciated, no role of repeat skin bx at this time  MRA LLE  done results noted  possibly trial of HBO per Podiatry    2nd opinion Rheum and Heme noted,and following  cont pletal  Steroids taper per Rheum    Adrenal incidenteloma - MRI reviewed, appears to be adrenal adenoma, Surg f/u      likely plan for amputation of toes or foot vs BKA LLE after complete demarcation  vascular to follow up for proposed sx    ipmn - GI appreciated, f/u CT outpt in 1 year    records sent to [facility], as daughter wishes pt to transfer there, but has been rejected    d/c planning to [facility] w/HBO tx as outpt pending insurance auth

## 2018-12-09 NOTE — PROGRESS NOTE ADULT - SUBJECTIVE AND OBJECTIVE BOX
CHIEF COMPLAINT:Patient is a 68y old  Female who presents with a chief complaint of Left foot cellulitis (01 Dec 2018 09:07)  no acute events    PAST MEDICAL & SURGICAL HISTORY:  DM (diabetes mellitus)  HTN (hypertension)  Breast CA  No significant past surgical history          REVIEW OF SYSTEMS:  CONSTITUTIONAL: No fever, weight loss, or fatigue  EYES: No eye pain, visual disturbances, or discharge  NECK: No pain or stiffness  RESPIRATORY: No cough, wheezing, chills or hemoptysis; No Shortness of Breath  CARDIOVASCULAR: No chest pain, palpitations, passing out, dizziness,  GASTROINTESTINAL: No abdominal or epigastric pain. No nausea, vomiting, or hematemesis; No diarrhea or constipation. No melena or hematochezia.  GENITOURINARY: No dysuria, frequency, hematuria, or incontinence  NEUROLOGICAL: No headaches  l foot pain controlled      Medications:  MEDICATIONS  (STANDING):  apixaban 5 milliGRAM(s) Oral every 12 hours  cilostazol 100 milliGRAM(s) Oral two times a day  dextrose 5%. 1000 milliLiter(s) (50 mL/Hr) IV Continuous <Continuous>  dextrose 50% Injectable 12.5 Gram(s) IV Push once  gabapentin 200 milliGRAM(s) Oral three times a day  insulin lispro (HumaLOG) corrective regimen sliding scale   SubCutaneous three times a day before meals  lidocaine   Patch 1 Patch Transdermal daily  lisinopril 10 milliGRAM(s) Oral daily  predniSONE   Tablet 30 milliGRAM(s) Oral daily  predniSONE   Tablet   Oral     MEDICATIONS  (PRN):  acetaminophen   Tablet .. 975 milliGRAM(s) Oral every 6 hours PRN Mild Pain (1 - 3)  acetaminophen   Tablet .. 650 milliGRAM(s) Oral every 6 hours PRN Temp greater or equal to 38C (100.4F)  dextrose 40% Gel 15 Gram(s) Oral once PRN Blood Glucose LESS THAN 70 milliGRAM(s)/deciliter  glucagon  Injectable 1 milliGRAM(s) IntraMuscular once PRN Glucose LESS THAN 70 milligrams/deciliter    	    PHYSICAL EXAM:  T(C): 37 (12-09-18 @ 12:58), Max: 37.2 (12-08-18 @ 15:27)  HR: 100 (12-09-18 @ 12:58) (79 - 100)  BP: 107/76 (12-09-18 @ 12:58) (104/56 - 114/79)  RR: 18 (12-09-18 @ 12:58) (17 - 18)  SpO2: 98% (12-09-18 @ 12:58) (98% - 99%)  Wt(kg): --  I&O's Summary    Appearance: Normal	  HEENT:   Normal oral mucosa, PERRL, EOMI	  Lymphatic: No lymphadenopathy  Cardiovascular: Normal S1 S2, No JVD, No murmurs  Respiratory: Lungs clear to auscultation	  Psychiatry: A & O x 3,  Gastrointestinal:  Soft, Non-tender, + BS		  Neurologic: Non-focal  Extremities:l   ischemic changes  / demarcating    LABS:	 	    CARDIAC MARKERS:                  proBNP:   Lipid Profile:   HgA1c:   TSH:

## 2018-12-09 NOTE — PROGRESS NOTE ADULT - SUBJECTIVE AND OBJECTIVE BOX
RENATA DIXON:[id number],   68yFemale followed for:  No Known Allergies    PAST MEDICAL & SURGICAL HISTORY:  DM (diabetes mellitus)  HTN (hypertension)  Breast CA  No significant past surgical history    FAMILY HISTORY:  No pertinent family history in first degree relatives    MEDICATIONS  (STANDING):  apixaban 5 milliGRAM(s) Oral every 12 hours  cilostazol 100 milliGRAM(s) Oral two times a day  dextrose 5%. 1000 milliLiter(s) (50 mL/Hr) IV Continuous <Continuous>  dextrose 50% Injectable 12.5 Gram(s) IV Push once  gabapentin 200 milliGRAM(s) Oral three times a day  insulin lispro (HumaLOG) corrective regimen sliding scale   SubCutaneous three times a day before meals  lidocaine   Patch 1 Patch Transdermal daily  lisinopril 10 milliGRAM(s) Oral daily  predniSONE   Tablet 30 milliGRAM(s) Oral daily  predniSONE   Tablet   Oral     MEDICATIONS  (PRN):  acetaminophen   Tablet .. 975 milliGRAM(s) Oral every 6 hours PRN Mild Pain (1 - 3)  acetaminophen   Tablet .. 650 milliGRAM(s) Oral every 6 hours PRN Temp greater or equal to 38C (100.4F)  dextrose 40% Gel 15 Gram(s) Oral once PRN Blood Glucose LESS THAN 70 milliGRAM(s)/deciliter  glucagon  Injectable 1 milliGRAM(s) IntraMuscular once PRN Glucose LESS THAN 70 milligrams/deciliter      Vital Signs Last 24 Hrs  T(C): 37.2 (09 Dec 2018 05:41), Max: 37.2 (08 Dec 2018 15:27)  T(F): 99 (09 Dec 2018 05:41), Max: 99 (09 Dec 2018 05:41)  HR: 79 (09 Dec 2018 05:41) (79 - 90)  BP: 114/79 (09 Dec 2018 05:41) (104/56 - 114/79)  BP(mean): --  RR: 18 (09 Dec 2018 05:41) (17 - 18)  SpO2: 99% (09 Dec 2018 05:41) (99% - 99%)  nc/at  s1s2  cta  soft, nt, nd no guarding or rebound  no c/c/e

## 2018-12-10 LAB
APTT BLD: 35.9 SEC — SIGNIFICANT CHANGE UP (ref 27.5–36.3)
GLUCOSE BLDC GLUCOMTR-MCNC: 110 MG/DL — HIGH (ref 70–99)
GLUCOSE BLDC GLUCOMTR-MCNC: 120 MG/DL — HIGH (ref 70–99)
GLUCOSE BLDC GLUCOMTR-MCNC: 132 MG/DL — HIGH (ref 70–99)
GLUCOSE BLDC GLUCOMTR-MCNC: 163 MG/DL — HIGH (ref 70–99)

## 2018-12-10 RX ORDER — HEPARIN SODIUM 5000 [USP'U]/ML
INJECTION INTRAVENOUS; SUBCUTANEOUS
Qty: 25000 | Refills: 0 | Status: DISCONTINUED | OUTPATIENT
Start: 2018-12-10 | End: 2018-12-10

## 2018-12-10 RX ORDER — HEPARIN SODIUM 5000 [USP'U]/ML
2500 INJECTION INTRAVENOUS; SUBCUTANEOUS EVERY 6 HOURS
Qty: 0 | Refills: 0 | Status: DISCONTINUED | OUTPATIENT
Start: 2018-12-10 | End: 2018-12-10

## 2018-12-10 RX ORDER — HEPARIN SODIUM 5000 [USP'U]/ML
5500 INJECTION INTRAVENOUS; SUBCUTANEOUS ONCE
Qty: 0 | Refills: 0 | Status: DISCONTINUED | OUTPATIENT
Start: 2018-12-10 | End: 2018-12-10

## 2018-12-10 RX ORDER — APIXABAN 2.5 MG/1
5 TABLET, FILM COATED ORAL EVERY 12 HOURS
Qty: 0 | Refills: 0 | Status: DISCONTINUED | OUTPATIENT
Start: 2018-12-10 | End: 2018-12-12

## 2018-12-10 RX ORDER — HEPARIN SODIUM 5000 [USP'U]/ML
5500 INJECTION INTRAVENOUS; SUBCUTANEOUS EVERY 6 HOURS
Qty: 0 | Refills: 0 | Status: DISCONTINUED | OUTPATIENT
Start: 2018-12-10 | End: 2018-12-10

## 2018-12-10 RX ADMIN — CILOSTAZOL 100 MILLIGRAM(S): 100 TABLET ORAL at 06:02

## 2018-12-10 RX ADMIN — LISINOPRIL 10 MILLIGRAM(S): 2.5 TABLET ORAL at 06:02

## 2018-12-10 RX ADMIN — GABAPENTIN 200 MILLIGRAM(S): 400 CAPSULE ORAL at 06:02

## 2018-12-10 RX ADMIN — Medication 1: at 13:19

## 2018-12-10 RX ADMIN — GABAPENTIN 200 MILLIGRAM(S): 400 CAPSULE ORAL at 21:14

## 2018-12-10 RX ADMIN — APIXABAN 5 MILLIGRAM(S): 2.5 TABLET, FILM COATED ORAL at 17:51

## 2018-12-10 RX ADMIN — CILOSTAZOL 100 MILLIGRAM(S): 100 TABLET ORAL at 17:51

## 2018-12-10 RX ADMIN — GABAPENTIN 200 MILLIGRAM(S): 400 CAPSULE ORAL at 11:44

## 2018-12-10 RX ADMIN — APIXABAN 5 MILLIGRAM(S): 2.5 TABLET, FILM COATED ORAL at 06:02

## 2018-12-10 RX ADMIN — Medication 30 MILLIGRAM(S): at 06:02

## 2018-12-10 NOTE — PROGRESS NOTE ADULT - ASSESSMENT
68 year old F with Left foot gangrene  -Pt seen and evaluated bedside  - Left foot forefoot gangrene stable, no clinical signs of infection.   - Due to extent of plantar skin necrosis proximal TMA is not a option at this time. Previously discussed with patient in-depth (30 minutes) options and practicality of going to HBO 5X a week and continued wound care will low odds of salvageability of foot vs below knee amp. Pt opting for Rehab with HBO at this time.   - Awaiting rehab placement  - No pod surgical intervention at this time.   - Dressed with Xeroform and DSD, to be performed daily.   - Podiatry discharge instructions on sunrise  - Per ID, observe off abx, agree with this plan at this time  - Stable for d/c per podiatry  - Will continue to follow 68 year old F with Left foot gangrene  -Pt seen and evaluated bedside  - Left foot forefoot gangrene stable, no clinical signs of infection.   - Due to extent of plantar skin necrosis proximal TMA is not a option at this time. Previously discussed with patient in-depth (30 minutes) options and practicality of going to HBO 5X a week and continued wound care will low odds of salvageability of foot vs below knee amp. Pt and family agreeing with BKA at this time.   - Please discuss BKA options with vascular.  - No pod surgical intervention at this time.   - Dressed with Xeroform and DSD, to be performed daily.   - Per ID, observe off abx, agree with this plan at this time  - Will continue to follow until possible BKA.

## 2018-12-10 NOTE — PROGRESS NOTE ADULT - SUBJECTIVE AND OBJECTIVE BOX
RENATA DIXON:[id number],   68yFemale followed for:  No Known Allergies    PAST MEDICAL & SURGICAL HISTORY:  DM (diabetes mellitus)  HTN (hypertension)  Breast CA  No significant past surgical history    FAMILY HISTORY:  No pertinent family history in first degree relatives    MEDICATIONS  (STANDING):  apixaban 5 milliGRAM(s) Oral every 12 hours  cilostazol 100 milliGRAM(s) Oral two times a day  dextrose 5%. 1000 milliLiter(s) (50 mL/Hr) IV Continuous <Continuous>  dextrose 50% Injectable 12.5 Gram(s) IV Push once  gabapentin 200 milliGRAM(s) Oral three times a day  insulin lispro (HumaLOG) corrective regimen sliding scale   SubCutaneous three times a day before meals  lidocaine   Patch 1 Patch Transdermal daily  lisinopril 10 milliGRAM(s) Oral daily  predniSONE   Tablet 30 milliGRAM(s) Oral daily  predniSONE   Tablet   Oral     MEDICATIONS  (PRN):  acetaminophen   Tablet .. 975 milliGRAM(s) Oral every 6 hours PRN Mild Pain (1 - 3)  acetaminophen   Tablet .. 650 milliGRAM(s) Oral every 6 hours PRN Temp greater or equal to 38C (100.4F)  dextrose 40% Gel 15 Gram(s) Oral once PRN Blood Glucose LESS THAN 70 milliGRAM(s)/deciliter  docusate sodium 100 milliGRAM(s) Oral three times a day PRN Constipation  glucagon  Injectable 1 milliGRAM(s) IntraMuscular once PRN Glucose LESS THAN 70 milligrams/deciliter      Vital Signs Last 24 Hrs  T(C): 37.2 (10 Dec 2018 05:10), Max: 37.2 (10 Dec 2018 05:10)  T(F): 98.9 (10 Dec 2018 05:10), Max: 98.9 (10 Dec 2018 05:10)  HR: 86 (10 Dec 2018 05:10) (86 - 100)  BP: 112/69 (10 Dec 2018 06:01) (107/76 - 112/71)  BP(mean): --  RR: 18 (10 Dec 2018 05:10) (18 - 18)  SpO2: 100% (10 Dec 2018 05:10) (98% - 100%)  nc/at  s1s2  cta  soft, nt, nd no guarding or rebound  no c/c/e

## 2018-12-10 NOTE — PROGRESS NOTE ADULT - ASSESSMENT
69 yo woman with a hx of DM, HTN and breast CA s/p lumpectomy presented with left foot pain, swelling and redness. CTA obtained which showed patent vessels to ankles. Currently on medicine service undergoing work up for hypercoagulability / vasculitis.    - Will plan for OR Wednesday (12/12/18) for LLE BKA. Discussed with patient - pt is agreeable however would like to speak to attending directly regarding surgery prior to signing consent  - Please document medical clearance prior to OR  - Hold eliquis for now, transition to heparin gtt    Surgery, C-Team   Pager: [phone]

## 2018-12-10 NOTE — PROGRESS NOTE ADULT - SUBJECTIVE AND OBJECTIVE BOX
CHIEF COMPLAINT:Patient is a 68y old  Female who presents with a chief complaint of Left foot cellulitis (01 Dec 2018 09:07)  no acute events    PAST MEDICAL & SURGICAL HISTORY:  DM (diabetes mellitus)  HTN (hypertension)  Breast CA  No significant past surgical history          REVIEW OF SYSTEMS:  CONSTITUTIONAL: No fever, weight loss, or fatigue  EYES: No eye pain, visual disturbances, or discharge  NECK: No pain or stiffness  RESPIRATORY: No cough, wheezing, chills or hemoptysis; No Shortness of Breath  CARDIOVASCULAR: No chest pain, palpitations, passing out, dizziness,  GASTROINTESTINAL: No abdominal or epigastric pain. No nausea, vomiting, or hematemesis; No diarrhea or constipation. No melena or hematochezia.  GENITOURINARY: No dysuria, frequency, hematuria, or incontinence  NEUROLOGICAL: No headaches  l foot pain controlled      Medications:  MEDICATIONS  (STANDING):  cilostazol 100 milliGRAM(s) Oral two times a day  dextrose 5%. 1000 milliLiter(s) (50 mL/Hr) IV Continuous <Continuous>  dextrose 50% Injectable 12.5 Gram(s) IV Push once  gabapentin 200 milliGRAM(s) Oral three times a day  heparin  Infusion.  Unit(s)/Hr (12 mL/Hr) IV Continuous <Continuous>  heparin  Injectable 5500 Unit(s) IV Push once  insulin lispro (HumaLOG) corrective regimen sliding scale   SubCutaneous three times a day before meals  lidocaine   Patch 1 Patch Transdermal daily  lisinopril 10 milliGRAM(s) Oral daily  predniSONE   Tablet 30 milliGRAM(s) Oral daily  predniSONE   Tablet   Oral     MEDICATIONS  (PRN):  acetaminophen   Tablet .. 975 milliGRAM(s) Oral every 6 hours PRN Mild Pain (1 - 3)  acetaminophen   Tablet .. 650 milliGRAM(s) Oral every 6 hours PRN Temp greater or equal to 38C (100.4F)  dextrose 40% Gel 15 Gram(s) Oral once PRN Blood Glucose LESS THAN 70 milliGRAM(s)/deciliter  docusate sodium 100 milliGRAM(s) Oral three times a day PRN Constipation  glucagon  Injectable 1 milliGRAM(s) IntraMuscular once PRN Glucose LESS THAN 70 milligrams/deciliter  heparin  Injectable 5500 Unit(s) IV Push every 6 hours PRN For aPTT less than 40  heparin  Injectable 2500 Unit(s) IV Push every 6 hours PRN For aPTT between 40 - 57    	    PHYSICAL EXAM:  T(C): 37.2 (12-10-18 @ 05:10), Max: 37.2 (12-10-18 @ 05:10)  HR: 86 (12-10-18 @ 05:10) (86 - 100)  BP: 112/69 (12-10-18 @ 06:01) (107/76 - 112/71)  RR: 18 (12-10-18 @ 05:10) (18 - 18)  SpO2: 100% (12-10-18 @ 05:10) (98% - 100%)  Wt(kg): --  I&O's Summary      Appearance: Normal	  HEENT:   Normal oral mucosa, PERRL, EOMI	  Lymphatic: No lymphadenopathy  Cardiovascular: Normal S1 S2, No JVD, No murmurs  Respiratory: Lungs clear to auscultation	  Psychiatry: A & O x 3,  Gastrointestinal:  Soft, Non-tender, + BS		  Neurologic: Non-focal  Extremities:l   ischemic changes  / demarcating    LABS:	 	    CARDIAC MARKERS:                  proBNP:   Lipid Profile:   HgA1c:   TSH:

## 2018-12-10 NOTE — PROGRESS NOTE ADULT - SUBJECTIVE AND OBJECTIVE BOX
VASCULAR SURGERY PROGRESS NOTE      Subjective:  No acute events overnight        Objective:    PE:  Gen: Laying in bed, in NAD  Resp: Nonlabored  Abd: soft, ND, NT  Ext:   LLE: Well demarcated dry gangrene to the forefoot. Dorsal skin with some bullae, no signs of infection or collection.      Vital Signs Last 24 Hrs  T(C): 37.2 (10 Dec 2018 05:10), Max: 37.2 (10 Dec 2018 05:10)  T(F): 98.9 (10 Dec 2018 05:10), Max: 98.9 (10 Dec 2018 05:10)  HR: 86 (10 Dec 2018 05:10) (86 - 100)  BP: 112/69 (10 Dec 2018 06:01) (107/76 - 112/71)  BP(mean): --  RR: 18 (10 Dec 2018 05:10) (18 - 18)  SpO2: 100% (10 Dec 2018 05:10) (98% - 100%)    I&O's Detail      Daily     Daily     MEDICATIONS  (STANDING):  cilostazol 100 milliGRAM(s) Oral two times a day  dextrose 5%. 1000 milliLiter(s) (50 mL/Hr) IV Continuous <Continuous>  dextrose 50% Injectable 12.5 Gram(s) IV Push once  gabapentin 200 milliGRAM(s) Oral three times a day  heparin  Infusion.  Unit(s)/Hr (12 mL/Hr) IV Continuous <Continuous>  heparin  Injectable 5500 Unit(s) IV Push once  insulin lispro (HumaLOG) corrective regimen sliding scale   SubCutaneous three times a day before meals  lidocaine   Patch 1 Patch Transdermal daily  lisinopril 10 milliGRAM(s) Oral daily  predniSONE   Tablet 30 milliGRAM(s) Oral daily  predniSONE   Tablet   Oral     MEDICATIONS  (PRN):  acetaminophen   Tablet .. 975 milliGRAM(s) Oral every 6 hours PRN Mild Pain (1 - 3)  acetaminophen   Tablet .. 650 milliGRAM(s) Oral every 6 hours PRN Temp greater or equal to 38C (100.4F)  dextrose 40% Gel 15 Gram(s) Oral once PRN Blood Glucose LESS THAN 70 milliGRAM(s)/deciliter  docusate sodium 100 milliGRAM(s) Oral three times a day PRN Constipation  glucagon  Injectable 1 milliGRAM(s) IntraMuscular once PRN Glucose LESS THAN 70 milligrams/deciliter  heparin  Injectable 5500 Unit(s) IV Push every 6 hours PRN For aPTT less than 40  heparin  Injectable 2500 Unit(s) IV Push every 6 hours PRN For aPTT between 40 - 57      LABS:                RADIOLOGY & ADDITIONAL STUDIES:

## 2018-12-10 NOTE — PROGRESS NOTE ADULT - SUBJECTIVE AND OBJECTIVE BOX
Patient is a 68y old  Female who presents with a chief complaint of Left foot cellulitis (09 Dec 2018 13:34)     INTERVAL HPI/OVERNIGHT EVENTS:  Patient seen and evaluated at bedside.  Pt is resting comfortable in NAD. Denies N/V/F/C.  Pain denies pain.     Allergies    No Known Allergies    Intolerances    Vital Signs Last 24 Hrs  T(C): 37.2 (10 Dec 2018 05:10), Max: 37.2 (10 Dec 2018 05:10)  T(F): 98.9 (10 Dec 2018 05:10), Max: 98.9 (10 Dec 2018 05:10)  HR: 86 (10 Dec 2018 05:10) (86 - 100)  BP: 112/69 (10 Dec 2018 06:01) (107/76 - 112/71)  BP(mean): --  RR: 18 (10 Dec 2018 05:10) (18 - 18)  SpO2: 100% (10 Dec 2018 05:10) (98% - 100%)    LABS:    CAPILLARY BLOOD GLUCOSE    POCT Blood Glucose.: 132 mg/dL (10 Dec 2018 08:30)  POCT Blood Glucose.: 122 mg/dL (09 Dec 2018 22:22)  POCT Blood Glucose.: 131 mg/dL (09 Dec 2018 17:06)  POCT Blood Glucose.: 220 mg/dL (09 Dec 2018 12:15)    Lower Extremity Physical Exam:  Left foot with well demarcated gangrene to the forefoot primarily plantarly which is full thickness.  Dorsal skin is partial thickness intraepidermal bulla proximally with overlying skin sloughing, fibrotic wound bed underlying to dermis. There are no signs of infection or collection.  No edema or erythema noted and no cellulitis, no fluctuance, no purulence, no malodor.     RADIOLOGY & ADDITIONAL TESTS:  < from: Xray Ankle 2 Views, Left (11.14.18 @ 01:24) >  EXAM:  RAD ANKLE 2 VIEWS LEFT      EXAM:  RAD FOOT 2 VIEWS LEFT      EXAM:  RAD TIB-FIB LT      PROCEDURE DATE:  Nov 14 2018     INTERPRETATION:  CLINICAL INFORMATION: Left foot pain. Concern for   osteomyelitis.    TECHNIQUE: 2 views of the left leg, 2 views of the left ankle, and 2   views of the left foot     COMPARISON: None    FINDINGS:    Left leg: There is no acute fracture or dislocation. The joint spaces are   preserved. Soft tissues are unremarkable. There is no radiographic   evidence of osteomyelitis.    Left ankle: There is no acute fracture or dislocation. The joint spaces   are preserved. Soft tissues are unremarkable. There is no radiographic   evidence of osteomyelitis.    Left foot:   There are no fractures. No subcutaneous air or bone   destruction to suggest osteomyelitis.    IMPRESSION:    There is no plain film radiographic evidence of osteomyelitis.    CONRAD BLAKE M.D., RADIOLOGY RESIDENT  This document has been electronically signed.  PAVEL GALLEGO, ATTENDING RADIOLOGIST  This document has been electronically signed. Nov 14 2018  5:57AM    < end of copied text >

## 2018-12-10 NOTE — PROGRESS NOTE ADULT - ASSESSMENT
67 yo F w pmhx of T2DM, HTN presenting with 1 month of LLE swelling and discoloration admitted for  presumed cellulitis       seen by id and abs stopped  monitor off abx per ID as per id      ·  Problem: HTN (hypertension).   : BP well controlled  c/w meds      ·  Problem: DM (diabetes mellitus).    : Blood glucose well controlled  C/w sliding scale insulin.       ·  Problem: Microcytic anemia.    likely KRIS,   heme f/u noted    ·  Problem: Ischemia of LLE - off Heparin gtt, case discussed with vascular, empiric AC restarted w/Eliquis, will transition to Heparin gtt again in view of possible BKA Wednesday  Rheum and Heme appreciated, vasculitis/APLS w/u neg  stopped a/c as per/vasc/rheum  Derm appreciated, no role of repeat skin bx at this time  MRA LLE  done results noted  possibly trial of HBO per Podiatry    2nd opinion Rheum and Heme noted,and following  cont pletal  Steroids taper per Rheum    Adrenal incidenteloma - MRI reviewed, appears to be adrenal adenoma, Surg f/u      likely plan for amputation of toes or foot vs BKA LLE after complete demarcation  vascular to follow up for proposed sx    ipmn - GI appreciated, f/u CT outpt in 1 year    records sent to [facility], as daughter wishes pt to transfer there, but has been rejected    pt now states desires BKA, ongoing discussion w/daughter and [facility] surgery  options include [facility] discharge w/outpt HBO tx vs BKA on Wendesday  await pt and family decision

## 2018-12-10 NOTE — CHART NOTE - NSCHARTNOTEFT_GEN_A_CORE
NUTRITION FOLLOW-UP:  Pt. observed w 100% consumption of Glucerna and breakfast tray.  Pt. states she is eating well.  Denies issues chewing/swallowing or nausea/vomiting.  C/o constipation (consider bowel regimen).  Reviewed therapeutic diet modifications & emphasized protein intake, of which Pt. teaches back fairly good understanding of.  Multivitamin suggested for micronutrient coverage and to aid with wound healing.  Pt. with possible significant weight decrease since admission.  States usual body weight as ~160lbs PTA.  Currently ~137lbs.  Based on noted/obtained weights, estimate 6.1kg, significant 8.9% decrease since admission (<1 month).  However, given improvement in edema since admission, weight change perhaps somewhat fluid related.    Weight:  62.3kg on 12/10/18 obtained by RDN via bedscale               68.4kg on 11/14/18    Edema: 1+ left foot.    Skin: No noted pressure injuries.  +Left foot gangrene.      Pertinent Medications: MEDICATIONS  (STANDING):  apixaban 5 milliGRAM(s) Oral every 12 hours  cilostazol 100 milliGRAM(s) Oral two times a day  dextrose 5%. 1000 milliLiter(s) (50 mL/Hr) IV Continuous <Continuous>  dextrose 50% Injectable 12.5 Gram(s) IV Push once  gabapentin 200 milliGRAM(s) Oral three times a day  insulin lispro (HumaLOG) corrective regimen sliding scale   SubCutaneous three times a day before meals  lidocaine   Patch 1 Patch Transdermal daily  lisinopril 10 milliGRAM(s) Oral daily  predniSONE   Tablet 30 milliGRAM(s) Oral daily  predniSONE   Tablet   Oral     MEDICATIONS  (PRN):  acetaminophen   Tablet .. 975 milliGRAM(s) Oral every 6 hours PRN Mild Pain (1 - 3)  acetaminophen   Tablet .. 650 milliGRAM(s) Oral every 6 hours PRN Temp greater or equal to 38C (100.4F)  dextrose 40% Gel 15 Gram(s) Oral once PRN Blood Glucose LESS THAN 70 milliGRAM(s)/deciliter  docusate sodium 100 milliGRAM(s) Oral three times a day PRN Constipation  glucagon  Injectable 1 milliGRAM(s) IntraMuscular once PRN Glucose LESS THAN 70 milligrams/deciliter    Pertinent Labs:   12-06 Phos 2.7 mg/dL 11-14 [id number] 6.3 %<H>    CAPILLARY BLOOD GLUCOSE      POCT Blood Glucose.: 132 mg/dL (10 Dec 2018 08:30)  POCT Blood Glucose.: 122 mg/dL (09 Dec 2018 22:22)  POCT Blood Glucose.: 131 mg/dL (09 Dec 2018 17:06)  POCT Blood Glucose.: 220 mg/dL (09 Dec 2018 12:15)      Current Diet:  consistent carbohydrate w evening snack, DASH/TLC (cholesterol & Na restricted) + Glucerna Shake 8oz PO 2x daily       NUTRITION Dx:  Pt. remains severely malnourished.        PLAN/RECOMMENDATIONS:    1) Continue consistent carbohydrate w evening snack, DASH/TLC (cholesterol & Na restricted) diet + Glucerna Shake 8oz PO 2x daily   2) Obtain weekly weights  3) Initiate Multivitamin   4) Consider bowel regimen (if medically appropriate)  5) RDN remains available and will f/u PRN.          Marcy Olson RDN, CDN pager [phone]

## 2018-12-10 NOTE — CHART NOTE - NSCHARTNOTEFT_GEN_A_CORE
Spoke with pt, pt currently refusing any vascular intervention. Vascular and attending aware. Will stop heparin gtt and resume Eliquis.     ADS  [id number]

## 2018-12-11 LAB
GLUCOSE BLDC GLUCOMTR-MCNC: 127 MG/DL — HIGH (ref 70–99)
GLUCOSE BLDC GLUCOMTR-MCNC: 137 MG/DL — HIGH (ref 70–99)
GLUCOSE BLDC GLUCOMTR-MCNC: 208 MG/DL — HIGH (ref 70–99)

## 2018-12-11 RX ADMIN — Medication 2: at 12:31

## 2018-12-11 RX ADMIN — GABAPENTIN 200 MILLIGRAM(S): 400 CAPSULE ORAL at 05:29

## 2018-12-11 RX ADMIN — APIXABAN 5 MILLIGRAM(S): 2.5 TABLET, FILM COATED ORAL at 05:28

## 2018-12-11 RX ADMIN — CILOSTAZOL 100 MILLIGRAM(S): 100 TABLET ORAL at 05:29

## 2018-12-11 RX ADMIN — Medication 30 MILLIGRAM(S): at 05:29

## 2018-12-11 RX ADMIN — LISINOPRIL 10 MILLIGRAM(S): 2.5 TABLET ORAL at 05:29

## 2018-12-11 RX ADMIN — GABAPENTIN 200 MILLIGRAM(S): 400 CAPSULE ORAL at 13:35

## 2018-12-11 RX ADMIN — CILOSTAZOL 100 MILLIGRAM(S): 100 TABLET ORAL at 17:26

## 2018-12-11 RX ADMIN — APIXABAN 5 MILLIGRAM(S): 2.5 TABLET, FILM COATED ORAL at 17:26

## 2018-12-11 NOTE — PROGRESS NOTE ADULT - ASSESSMENT
69 yo F w pmhx of T2DM, HTN presenting with 1 month of LLE swelling and discoloration admitted for  presumed cellulitis       seen by id and abs stopped  monitor off abx per ID as per id      ·  Problem: HTN (hypertension).   : BP well controlled  c/w meds      ·  Problem: DM (diabetes mellitus).    : Blood glucose well controlled  C/w sliding scale insulin.       ·  Problem: Microcytic anemia.    likely KRIS,   heme f/u noted    ·  Problem: Ischemia of LLE - off Heparin gtt, case discussed with vascular, empiric AC restarted w/Eliquis, will transition to Heparin gtt again in view of possible BKA Wednesday  Rheum and Heme appreciated, vasculitis/APLS w/u neg  stopped a/c as per/vasc/rheum  Derm appreciated, no role of repeat skin bx at this time  MRA LLE  done results noted  possibly trial of HBO per Podiatry    2nd opinion Rheum and Heme noted,and following  cont pletal  Steroids taper per Rheum    Adrenal incidenteloma - MRI reviewed, appears to be adrenal adenoma, Surg f/u      likely plan for amputation of toes or foot vs BKA LLE after complete demarcation  vascular to follow up for proposed sx    ipmn - GI appreciated, f/u CT outpt in 1 year    records sent to [facility], as daughter wishes pt to transfer there, but has been rejected    pt now refusing BKA this admission, wants to go to rehab and continue monitoring foot demarkation with HBO treatments  CARLOS ALBERTO planning

## 2018-12-11 NOTE — PROGRESS NOTE ADULT - SUBJECTIVE AND OBJECTIVE BOX
RENATA DIXON:[id number],   68yFemale followed for:  No Known Allergies    PAST MEDICAL & SURGICAL HISTORY:  DM (diabetes mellitus)  HTN (hypertension)  Breast CA  No significant past surgical history    FAMILY HISTORY:  No pertinent family history in first degree relatives    MEDICATIONS  (STANDING):  apixaban 5 milliGRAM(s) Oral every 12 hours  cilostazol 100 milliGRAM(s) Oral two times a day  dextrose 5%. 1000 milliLiter(s) (50 mL/Hr) IV Continuous <Continuous>  dextrose 50% Injectable 12.5 Gram(s) IV Push once  gabapentin 200 milliGRAM(s) Oral three times a day  insulin lispro (HumaLOG) corrective regimen sliding scale   SubCutaneous three times a day before meals  lidocaine   Patch 1 Patch Transdermal daily  lisinopril 10 milliGRAM(s) Oral daily  predniSONE   Tablet 30 milliGRAM(s) Oral daily  predniSONE   Tablet   Oral     MEDICATIONS  (PRN):  acetaminophen   Tablet .. 975 milliGRAM(s) Oral every 6 hours PRN Mild Pain (1 - 3)  acetaminophen   Tablet .. 650 milliGRAM(s) Oral every 6 hours PRN Temp greater or equal to 38C (100.4F)  dextrose 40% Gel 15 Gram(s) Oral once PRN Blood Glucose LESS THAN 70 milliGRAM(s)/deciliter  docusate sodium 100 milliGRAM(s) Oral three times a day PRN Constipation  glucagon  Injectable 1 milliGRAM(s) IntraMuscular once PRN Glucose LESS THAN 70 milligrams/deciliter      Vital Signs Last 24 Hrs  T(C): 36.6 (11 Dec 2018 05:26), Max: 37.3 (10 Dec 2018 14:24)  T(F): 97.9 (11 Dec 2018 05:26), Max: 99.1 (10 Dec 2018 14:24)  HR: 91 (11 Dec 2018 05:26) (91 - 104)  BP: 112/70 (11 Dec 2018 05:26) (106/66 - 112/70)  BP(mean): --  RR: 18 (11 Dec 2018 05:26) (18 - 18)  SpO2: 96% (11 Dec 2018 05:26) (96% - 100%)  nc/at  s1s2  cta  soft, nt, nd no guarding or rebound  no c/c/e            PTT - ( 10 Dec 2018 12:30 )  PTT:35.9 SEC

## 2018-12-11 NOTE — PROGRESS NOTE ADULT - SUBJECTIVE AND OBJECTIVE BOX
Fu L foot isch  Pain controlled  No gross infection  Dry isch/gang changes stable, no progression proximally  +PT pulse    DW'ed 'dania Cordoba yest: Options for successful (partial) L foot salvage marginal - will require a prolonged course, mult procedures etc,  wo guarantee of success- hence Ms Whipple requested to proceed w BKA to expedite healing and recovery to ambulate w prosthesis.  OR time for BKA secured for tomorrow 12/12.  BRITTNEY'ed pt and daughter Екатерина (>one hour): cond, options, implications explained.  Currently they are reluctant to consent to BKA.  Want to think about it.  My cell # provided to optimize communication.  Will recheck tomorrow  In the meanwhile cont med Tx, local care etc. Fu L foot isch  Pain controlled  No gross infection  Dry isch/gang changes stable, no progression proximally  +PT pulse    DW'ed 'dania Cordoba yest: Options for successful (partial) L foot salvage marginal - will require a prolonged course, mult procedures etc,  wo guarantee of success- hence Ms Whipple requested to proceed w BKA to expedite healing and recovery to ambulate w prosthesis.  OR time for BKA secured for tomorrow 12/12.  BRITTNEY'ed pt and daughter Екатерина (>one hour): cond, options, implications explained.  Currently they are reluctant to consent to BKA.  Want to think about it.  My cell # provided to optimize communication.  Will recheck tomorrow  In the meanwhile cont med Tx, local care etc.    ** Just found out that since pt refused surgery- Eliquis was restarted today - Went back and notified pt that Surgery cannot take place tomorrow.  Next poss OR time on Fri if pt agrees (will need Eliquis off x 48 hrs preop) Fu L foot isch  Pain controlled  No gross infection  Dry isch/gang changes stable, no progression proximally  +PT pulse    DW'ed 'dania Cordoba yest: Options for successful (partial) L foot salvage marginal - will require a prolonged course, mult procedures etc,  wo guarantee of success- hence Ms Whipple requested to proceed w BKA to expedite healing and recovery to ambulate w prosthesis.  OR time for BKA secured for tomorrow 12/12.  BRITTNEY'ed pt and daughter Екатерина (>one hour): cond, options, implications explained.  Currently they are reluctant to consent to BKA.  Want to think about it.  My cell # provided to optimize communication.  Will recheck tomorrow  In the meanwhile cont med Tx, local care etc.    **Noted that since pt refused surgery- Eliquis was restarted today - Went back and notified pt that Surgery cannot take place tomorrow.  Next poss OR time on Fri if pt agrees (will need Eliquis off x 48 hrs preop)

## 2018-12-11 NOTE — PROGRESS NOTE ADULT - SUBJECTIVE AND OBJECTIVE BOX
CHIEF COMPLAINT:Patient is a 68y old  Female who presents with a chief complaint of Left foot cellulitis (01 Dec 2018 09:07)  no acute events    PAST MEDICAL & SURGICAL HISTORY:  DM (diabetes mellitus)  HTN (hypertension)  Breast CA  No significant past surgical history          REVIEW OF SYSTEMS:  CONSTITUTIONAL: No fever, weight loss, or fatigue  EYES: No eye pain, visual disturbances, or discharge  NECK: No pain or stiffness  RESPIRATORY: No cough, wheezing, chills or hemoptysis; No Shortness of Breath  CARDIOVASCULAR: No chest pain, palpitations, passing out, dizziness,  GASTROINTESTINAL: No abdominal or epigastric pain. No nausea, vomiting, or hematemesis; No diarrhea or constipation. No melena or hematochezia.  GENITOURINARY: No dysuria, frequency, hematuria, or incontinence  NEUROLOGICAL: No headaches  l foot pain controlled      Medications:  MEDICATIONS  (STANDING):  apixaban 5 milliGRAM(s) Oral every 12 hours  cilostazol 100 milliGRAM(s) Oral two times a day  dextrose 5%. 1000 milliLiter(s) (50 mL/Hr) IV Continuous <Continuous>  dextrose 50% Injectable 12.5 Gram(s) IV Push once  gabapentin 200 milliGRAM(s) Oral three times a day  insulin lispro (HumaLOG) corrective regimen sliding scale   SubCutaneous three times a day before meals  lidocaine   Patch 1 Patch Transdermal daily  lisinopril 10 milliGRAM(s) Oral daily  predniSONE   Tablet 30 milliGRAM(s) Oral daily  predniSONE   Tablet   Oral     MEDICATIONS  (PRN):  acetaminophen   Tablet .. 975 milliGRAM(s) Oral every 6 hours PRN Mild Pain (1 - 3)  acetaminophen   Tablet .. 650 milliGRAM(s) Oral every 6 hours PRN Temp greater or equal to 38C (100.4F)  dextrose 40% Gel 15 Gram(s) Oral once PRN Blood Glucose LESS THAN 70 milliGRAM(s)/deciliter  docusate sodium 100 milliGRAM(s) Oral three times a day PRN Constipation  glucagon  Injectable 1 milliGRAM(s) IntraMuscular once PRN Glucose LESS THAN 70 milligrams/deciliter    	    PHYSICAL EXAM:  T(C): 36.8 (12-11-18 @ 12:28), Max: 36.8 (12-11-18 @ 12:28)  HR: 98 (12-11-18 @ 12:28) (91 - 102)  BP: 105/66 (12-11-18 @ 12:28) (105/66 - 112/70)  RR: 17 (12-11-18 @ 12:28) (17 - 18)  SpO2: 96% (12-11-18 @ 12:28) (96% - 100%)  Wt(kg): --  I&O's Summary    Appearance: Normal	  HEENT:   Normal oral mucosa, PERRL, EOMI	  Lymphatic: No lymphadenopathy  Cardiovascular: Normal S1 S2, No JVD, No murmurs  Respiratory: Lungs clear to auscultation	  Psychiatry: A & O x 3,  Gastrointestinal:  Soft, Non-tender, + BS		  Neurologic: Non-focal  Extremities:l , dressed    LABS:	 	    CARDIAC MARKERS:                  proBNP:   Lipid Profile:   HgA1c:   TSH:

## 2018-12-12 LAB
APTT BLD: 121.5 SEC — CRITICAL HIGH (ref 27.5–36.3)
APTT BLD: 134 SEC — CRITICAL HIGH (ref 27.5–36.3)
APTT BLD: 32.4 SEC — SIGNIFICANT CHANGE UP (ref 27.5–36.3)
BUN SERPL-MCNC: 29 MG/DL — HIGH (ref 7–23)
CALCIUM SERPL-MCNC: 10.4 MG/DL — SIGNIFICANT CHANGE UP (ref 8.4–10.5)
CHLORIDE SERPL-SCNC: 100 MMOL/L — SIGNIFICANT CHANGE UP (ref 98–107)
CO2 SERPL-SCNC: 26 MMOL/L — SIGNIFICANT CHANGE UP (ref 22–31)
CREAT SERPL-MCNC: 0.95 MG/DL — SIGNIFICANT CHANGE UP (ref 0.5–1.3)
GLUCOSE BLDC GLUCOMTR-MCNC: 106 MG/DL — HIGH (ref 70–99)
GLUCOSE BLDC GLUCOMTR-MCNC: 136 MG/DL — HIGH (ref 70–99)
GLUCOSE BLDC GLUCOMTR-MCNC: 150 MG/DL — HIGH (ref 70–99)
GLUCOSE BLDC GLUCOMTR-MCNC: 223 MG/DL — HIGH (ref 70–99)
GLUCOSE SERPL-MCNC: 113 MG/DL — HIGH (ref 70–99)
HCT VFR BLD CALC: 33.5 % — LOW (ref 34.5–45)
HCT VFR BLD CALC: 34.4 % — LOW (ref 34.5–45)
HGB BLD-MCNC: 10 G/DL — LOW (ref 11.5–15.5)
HGB BLD-MCNC: 10.5 G/DL — LOW (ref 11.5–15.5)
MCHC RBC-ENTMCNC: 24.6 PG — LOW (ref 27–34)
MCHC RBC-ENTMCNC: 24.9 PG — LOW (ref 27–34)
MCHC RBC-ENTMCNC: 29.9 % — LOW (ref 32–36)
MCHC RBC-ENTMCNC: 30.5 % — LOW (ref 32–36)
MCV RBC AUTO: 81.5 FL — SIGNIFICANT CHANGE UP (ref 80–100)
MCV RBC AUTO: 82.3 FL — SIGNIFICANT CHANGE UP (ref 80–100)
NRBC # FLD: 0 — SIGNIFICANT CHANGE UP
NRBC # FLD: 0 — SIGNIFICANT CHANGE UP
PLATELET # BLD AUTO: 440 K/UL — HIGH (ref 150–400)
PLATELET # BLD AUTO: 459 K/UL — HIGH (ref 150–400)
PMV BLD: 9.6 FL — SIGNIFICANT CHANGE UP (ref 7–13)
PMV BLD: 9.6 FL — SIGNIFICANT CHANGE UP (ref 7–13)
POTASSIUM SERPL-MCNC: 4.9 MMOL/L — SIGNIFICANT CHANGE UP (ref 3.5–5.3)
POTASSIUM SERPL-SCNC: 4.9 MMOL/L — SIGNIFICANT CHANGE UP (ref 3.5–5.3)
RBC # BLD: 4.07 M/UL — SIGNIFICANT CHANGE UP (ref 3.8–5.2)
RBC # BLD: 4.22 M/UL — SIGNIFICANT CHANGE UP (ref 3.8–5.2)
RBC # FLD: 21.7 % — HIGH (ref 10.3–14.5)
RBC # FLD: 22.1 % — HIGH (ref 10.3–14.5)
SODIUM SERPL-SCNC: 138 MMOL/L — SIGNIFICANT CHANGE UP (ref 135–145)
WBC # BLD: 10.59 K/UL — HIGH (ref 3.8–10.5)
WBC # BLD: 11.09 K/UL — HIGH (ref 3.8–10.5)
WBC # FLD AUTO: 10.59 K/UL — HIGH (ref 3.8–10.5)
WBC # FLD AUTO: 11.09 K/UL — HIGH (ref 3.8–10.5)

## 2018-12-12 PROCEDURE — 90792 PSYCH DIAG EVAL W/MED SRVCS: CPT

## 2018-12-12 PROCEDURE — 99232 SBSQ HOSP IP/OBS MODERATE 35: CPT

## 2018-12-12 RX ORDER — HEPARIN SODIUM 5000 [USP'U]/ML
5500 INJECTION INTRAVENOUS; SUBCUTANEOUS ONCE
Qty: 0 | Refills: 0 | Status: COMPLETED | OUTPATIENT
Start: 2018-12-12 | End: 2018-12-12

## 2018-12-12 RX ORDER — HEPARIN SODIUM 5000 [USP'U]/ML
5500 INJECTION INTRAVENOUS; SUBCUTANEOUS EVERY 6 HOURS
Qty: 0 | Refills: 0 | Status: DISCONTINUED | OUTPATIENT
Start: 2018-12-12 | End: 2018-12-14

## 2018-12-12 RX ORDER — HEPARIN SODIUM 5000 [USP'U]/ML
2500 INJECTION INTRAVENOUS; SUBCUTANEOUS EVERY 6 HOURS
Qty: 0 | Refills: 0 | Status: DISCONTINUED | OUTPATIENT
Start: 2018-12-12 | End: 2018-12-14

## 2018-12-12 RX ORDER — HEPARIN SODIUM 5000 [USP'U]/ML
INJECTION INTRAVENOUS; SUBCUTANEOUS
Qty: 25000 | Refills: 0 | Status: DISCONTINUED | OUTPATIENT
Start: 2018-12-12 | End: 2018-12-14

## 2018-12-12 RX ADMIN — Medication 30 MILLIGRAM(S): at 05:06

## 2018-12-12 RX ADMIN — GABAPENTIN 200 MILLIGRAM(S): 400 CAPSULE ORAL at 00:31

## 2018-12-12 RX ADMIN — LISINOPRIL 10 MILLIGRAM(S): 2.5 TABLET ORAL at 05:07

## 2018-12-12 RX ADMIN — CILOSTAZOL 100 MILLIGRAM(S): 100 TABLET ORAL at 05:07

## 2018-12-12 RX ADMIN — HEPARIN SODIUM 1100 UNIT(S)/HR: 5000 INJECTION INTRAVENOUS; SUBCUTANEOUS at 14:13

## 2018-12-12 RX ADMIN — GABAPENTIN 200 MILLIGRAM(S): 400 CAPSULE ORAL at 21:33

## 2018-12-12 RX ADMIN — HEPARIN SODIUM 900 UNIT(S)/HR: 5000 INJECTION INTRAVENOUS; SUBCUTANEOUS at 22:33

## 2018-12-12 RX ADMIN — CILOSTAZOL 100 MILLIGRAM(S): 100 TABLET ORAL at 17:41

## 2018-12-12 RX ADMIN — HEPARIN SODIUM 5500 UNIT(S): 5000 INJECTION INTRAVENOUS; SUBCUTANEOUS at 07:24

## 2018-12-12 RX ADMIN — HEPARIN SODIUM 1200 UNIT(S)/HR: 5000 INJECTION INTRAVENOUS; SUBCUTANEOUS at 06:55

## 2018-12-12 RX ADMIN — Medication 2: at 12:52

## 2018-12-12 RX ADMIN — GABAPENTIN 200 MILLIGRAM(S): 400 CAPSULE ORAL at 13:46

## 2018-12-12 RX ADMIN — HEPARIN SODIUM 0 UNIT(S)/HR: 5000 INJECTION INTRAVENOUS; SUBCUTANEOUS at 21:31

## 2018-12-12 RX ADMIN — GABAPENTIN 200 MILLIGRAM(S): 400 CAPSULE ORAL at 05:07

## 2018-12-12 NOTE — PROVIDER CONTACT NOTE (CRITICAL VALUE NOTIFICATION) - ACTION/TREATMENT ORDERED:
Change heparin drip accordingly
heparin drip protocol followed, rate decreased by 1ml/hr, will continue to monitor

## 2018-12-12 NOTE — BEHAVIORAL HEALTH ASSESSMENT NOTE - HPI (INCLUDE ILLNESS QUALITY, SEVERITY, DURATION, TIMING, CONTEXT, MODIFYING FACTORS, ASSOCIATED SIGNS AND SYMPTOMS)
Briefly, the patient is a 68 year old woman, , has supportive adult daughters, is retired, has a medical history notable for DM, HTN, Breast cancer, has no prior known psychiatric history, presented to the ed on 11/14 with complaints of foot pain, swelling and redness. Patient was admitted for treatment of cellulitis. The initial plan was for the patient to go to rehab with outpatient bariatric oxygen treatment. Today vascular team changed the plan and it was decided that she required a BKA. Patient has been ambivalent about it, and so has the family. Hence this capacity evaluation request.   Met with the patient. Please note that she is alert, oriented, calm, pleasant, makes good eye contact, answers logically and is engaged well in conversation. She denies any mood symptoms, denies any si or hi, denies any a/vh or paranoia when asked. Denies any substance abuse.     Contrary to information received from the team, patient was logical, able to state reasons for her admission, her rather prolonged admission. Frustrated that she was about to go to a rehab in [place] today, and now the plan has changed to 'surgery'. She knows she has 'foot gangrene'. She states that she has to get the surgery, is consenting to it. She understands the risks of not doing the surgery, and able to state the benefits of it as well.     Patient is seeking for more information from the vascular team about the risks of the surgery itself. She also requests that her daughter be involved in coordination of care.

## 2018-12-12 NOTE — PROGRESS NOTE ADULT - ASSESSMENT
67 yo woman with a hx of DM, HTN and breast CA s/p lumpectomy presented with left foot pain, swelling and redness. CTA obtained which showed patent vessels to ankles. Currently on medicine service undergoing work up for hypercoagulability / vasculitis.    - Will continue discussions with patient and daughter regarding need for L BKA  - Please document medical clearance prior to OR  - Hold eliquis for now, transition to heparin gtt. Patient will need to be off of eliquis for ~48 hours prior to any OR.     Surgery, C-Team   Pager: [phone]

## 2018-12-12 NOTE — PROGRESS NOTE ADULT - SUBJECTIVE AND OBJECTIVE BOX
CHIEF COMPLAINT:Patient is a 68y old  Female who presents with a chief complaint of Left foot cellulitis (01 Dec 2018 09:07)  no acute events    PAST MEDICAL & SURGICAL HISTORY:  DM (diabetes mellitus)  HTN (hypertension)  Breast CA  No significant past surgical history          REVIEW OF SYSTEMS:  CONSTITUTIONAL: No fever, weight loss, or fatigue  EYES: No eye pain, visual disturbances, or discharge  NECK: No pain or stiffness  RESPIRATORY: No cough, wheezing, chills or hemoptysis; No Shortness of Breath  CARDIOVASCULAR: No chest pain, palpitations, passing out, dizziness,  GASTROINTESTINAL: No abdominal or epigastric pain. No nausea, vomiting, or hematemesis; No diarrhea or constipation. No melena or hematochezia.  GENITOURINARY: No dysuria, frequency, hematuria, or incontinence  NEUROLOGICAL: No headaches  l foot pain controlled      Medications:  MEDICATIONS  (STANDING):  cilostazol 100 milliGRAM(s) Oral two times a day  dextrose 5%. 1000 milliLiter(s) (50 mL/Hr) IV Continuous <Continuous>  dextrose 50% Injectable 12.5 Gram(s) IV Push once  gabapentin 200 milliGRAM(s) Oral three times a day  heparin  Infusion.  Unit(s)/Hr (12 mL/Hr) IV Continuous <Continuous>  insulin lispro (HumaLOG) corrective regimen sliding scale   SubCutaneous three times a day before meals  lidocaine   Patch 1 Patch Transdermal daily  lisinopril 10 milliGRAM(s) Oral daily  predniSONE   Tablet   Oral     MEDICATIONS  (PRN):  acetaminophen   Tablet .. 975 milliGRAM(s) Oral every 6 hours PRN Mild Pain (1 - 3)  acetaminophen   Tablet .. 650 milliGRAM(s) Oral every 6 hours PRN Temp greater or equal to 38C (100.4F)  dextrose 40% Gel 15 Gram(s) Oral once PRN Blood Glucose LESS THAN 70 milliGRAM(s)/deciliter  docusate sodium 100 milliGRAM(s) Oral three times a day PRN Constipation  glucagon  Injectable 1 milliGRAM(s) IntraMuscular once PRN Glucose LESS THAN 70 milligrams/deciliter  heparin  Injectable 5500 Unit(s) IV Push every 6 hours PRN For aPTT less than 40  heparin  Injectable 2500 Unit(s) IV Push every 6 hours PRN For aPTT between 40 - 57    	    PHYSICAL EXAM:  T(C): 36.8 (12-12-18 @ 05:04), Max: 36.8 (12-11-18 @ 12:28)  HR: 95 (12-12-18 @ 05:04) (95 - 99)  BP: 106/72 (12-12-18 @ 05:04) (105/64 - 106/72)  RR: 18 (12-12-18 @ 05:04) (17 - 18)  SpO2: 96% (12-12-18 @ 05:04) (96% - 98%)  Wt(kg): --  I&O's Summary    Appearance: Normal	  HEENT:   Normal oral mucosa, PERRL, EOMI	  Lymphatic: No lymphadenopathy  Cardiovascular: Normal S1 S2, No JVD, No murmurs  Respiratory: Lungs clear to auscultation	  Psychiatry: A & O x 3,  Gastrointestinal:  Soft, Non-tender, + BS		  Neurologic: Non-focal  Extremities:l , dressed    LABS:	 	    CARDIAC MARKERS:                                10.0   11.09 )-----------( 459      ( 12 Dec 2018 06:20 )             33.5     12-12    138  |  100  |  29<H>  ----------------------------<  113<H>  4.9   |  26  |  0.95    Ca    10.4      12 Dec 2018 06:20      proBNP:   Lipid Profile:   HgA1c:   TSH:

## 2018-12-12 NOTE — PROGRESS NOTE ADULT - ASSESSMENT
1. 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 Low suspicion for APLS as her B2 glycoprotein Abs neg and anticardiolipin IgG and IgM neg  -- pt now on hep gtt while off eliquis per vascular  -- f/u vascular, ID, and rheum  -- short course steroids completed, as per rheum  -- WBC improved, likely some degree of steroid effect    2. anemia likely anemia of chronic inflammation +/- KRIS    --  Repeat Fe panel still with ferritin of 300s  -- check soluble transferrin receptor and alpha thal as outpt  -- counts adequate for now  -- No B12/Folate Def  -- No Hemolysis  -- No monoclonal gammopathy on SPEP or TANNER    3. thrombocytosis     -- Reactive, improving  -- No indication for cytoreduction    4. Side branch IPMN     -- GI following  -- [id number] not elevated  -- outpt follow up    Pls call with questions, [phone]

## 2018-12-12 NOTE — PROVIDER CONTACT NOTE (MEDICATION) - RECOMMENDATIONS
Reschedule medication for later time during the day. Pending confirmation of date for possible surgery

## 2018-12-12 NOTE — PROGRESS NOTE ADULT - SUBJECTIVE AND OBJECTIVE BOX
Pt seen, no new complaints, minimal discomfort at the foot      MEDICATIONS  (STANDING):  cilostazol 100 milliGRAM(s) Oral two times a day  dextrose 5%. 1000 milliLiter(s) (50 mL/Hr) IV Continuous <Continuous>  dextrose 50% Injectable 12.5 Gram(s) IV Push once  gabapentin 200 milliGRAM(s) Oral three times a day  heparin  Infusion.  Unit(s)/Hr (12 mL/Hr) IV Continuous <Continuous>  insulin lispro (HumaLOG) corrective regimen sliding scale   SubCutaneous three times a day before meals  lidocaine   Patch 1 Patch Transdermal daily  lisinopril 10 milliGRAM(s) Oral daily  predniSONE   Tablet   Oral     MEDICATIONS  (PRN):  acetaminophen   Tablet .. 975 milliGRAM(s) Oral every 6 hours PRN Mild Pain (1 - 3)  acetaminophen   Tablet .. 650 milliGRAM(s) Oral every 6 hours PRN Temp greater or equal to 38C (100.4F)  dextrose 40% Gel 15 Gram(s) Oral once PRN Blood Glucose LESS THAN 70 milliGRAM(s)/deciliter  docusate sodium 100 milliGRAM(s) Oral three times a day PRN Constipation  glucagon  Injectable 1 milliGRAM(s) IntraMuscular once PRN Glucose LESS THAN 70 milligrams/deciliter  heparin  Injectable 5500 Unit(s) IV Push every 6 hours PRN For aPTT less than 40  heparin  Injectable 2500 Unit(s) IV Push every 6 hours PRN For aPTT between 40 - 57      ROS  No fever, sweats, chills  No epistaxis, HA, sore throat  No CP, SOB, cough, sputum  No n/v/d, abd pain, melena, hematochezia  No edema  No anxiety  No back pain, joint pain  No bleeding, bruising  No dysuria, hematuria    Vital Signs Last 24 Hrs  T(C): 36.8 (12 Dec 2018 13:07), Max: 36.8 (12 Dec 2018 05:04)  T(F): 98.3 (12 Dec 2018 13:07), Max: 98.3 (12 Dec 2018 13:07)  HR: 96 (12 Dec 2018 13:07) (95 - 99)  BP: 99/66 (12 Dec 2018 13:07) (99/66 - 106/72)  BP(mean): --  RR: 17 (12 Dec 2018 13:07) (17 - 18)  SpO2: 95% (12 Dec 2018 13:07) (95% - 98%)    PE  NAD  Awake, alert  Anicteric, MMM  RRR  CTAB  Abd soft, NT, ND  No edema, L foot with dry gangrene about half way up the foot, then darkened/cyanotic skin up to the ankle  FROM                          10.5   10.59 )-----------( 440      ( 12 Dec 2018 13:00 )             34.4       12-12    138  |  100  |  29<H>  ----------------------------<  113<H>  4.9   |  26  |  0.95    Ca    10.4      12 Dec 2018 06:20

## 2018-12-12 NOTE — BEHAVIORAL HEALTH ASSESSMENT NOTE - RISK ASSESSMENT
not in imminent risk to hurt self or others: older woman, mood stable, denies any si or hi, no psychosis, oriented x 3, calm, no behavioral issues

## 2018-12-12 NOTE — BEHAVIORAL HEALTH ASSESSMENT NOTE - NSBHCONSULTFOLLOWDETAILS_PSY_A_CORE FT
considering consultation question has been answered, will sign off for now. please call if any questions or needs for f/u

## 2018-12-12 NOTE — CHART NOTE - NSCHARTNOTEFT_GEN_A_CORE
Spoke with Vascular possible plan for OR on Friday, so Eliquis discontinued, pt started on heparin drip.

## 2018-12-12 NOTE — PROGRESS NOTE ADULT - SUBJECTIVE AND OBJECTIVE BOX
RENATA DIXON:[id number],   68yFemale followed for:  No Known Allergies    PAST MEDICAL & SURGICAL HISTORY:  DM (diabetes mellitus)  HTN (hypertension)  Breast CA  No significant past surgical history    FAMILY HISTORY:  No pertinent family history in first degree relatives    MEDICATIONS  (STANDING):  cilostazol 100 milliGRAM(s) Oral two times a day  dextrose 5%. 1000 milliLiter(s) (50 mL/Hr) IV Continuous <Continuous>  dextrose 50% Injectable 12.5 Gram(s) IV Push once  gabapentin 200 milliGRAM(s) Oral three times a day  heparin  Infusion.  Unit(s)/Hr (12 mL/Hr) IV Continuous <Continuous>  insulin lispro (HumaLOG) corrective regimen sliding scale   SubCutaneous three times a day before meals  lidocaine   Patch 1 Patch Transdermal daily  lisinopril 10 milliGRAM(s) Oral daily  predniSONE   Tablet   Oral     MEDICATIONS  (PRN):  acetaminophen   Tablet .. 975 milliGRAM(s) Oral every 6 hours PRN Mild Pain (1 - 3)  acetaminophen   Tablet .. 650 milliGRAM(s) Oral every 6 hours PRN Temp greater or equal to 38C (100.4F)  dextrose 40% Gel 15 Gram(s) Oral once PRN Blood Glucose LESS THAN 70 milliGRAM(s)/deciliter  docusate sodium 100 milliGRAM(s) Oral three times a day PRN Constipation  glucagon  Injectable 1 milliGRAM(s) IntraMuscular once PRN Glucose LESS THAN 70 milligrams/deciliter  heparin  Injectable 5500 Unit(s) IV Push every 6 hours PRN For aPTT less than 40  heparin  Injectable 2500 Unit(s) IV Push every 6 hours PRN For aPTT between 40 - 57      Vital Signs Last 24 Hrs  T(C): 36.8 (12 Dec 2018 05:04), Max: 36.8 (11 Dec 2018 12:28)  T(F): 98.2 (12 Dec 2018 05:04), Max: 98.3 (11 Dec 2018 12:28)  HR: 95 (12 Dec 2018 05:04) (95 - 99)  BP: 106/72 (12 Dec 2018 05:04) (105/64 - 106/72)  BP(mean): --  RR: 18 (12 Dec 2018 05:04) (17 - 18)  SpO2: 96% (12 Dec 2018 05:04) (96% - 98%)  nc/at  s1s2  cta  soft, nt, nd no guarding or rebound  no c/c/e    CBC Full  -  ( 12 Dec 2018 06:20 )  WBC Count : 11.09 K/uL  Hemoglobin : 10.0 g/dL  Hematocrit : 33.5 %  Platelet Count - Automated : 459 K/uL  Mean Cell Volume : 82.3 fL  Mean Cell Hemoglobin : 24.6 pg  Mean Cell Hemoglobin Concentration : 29.9 %  Auto Neutrophil # : x  Auto Lymphocyte # : x  Auto Monocyte # : x  Auto Eosinophil # : x  Auto Basophil # : x  Auto Neutrophil % : x  Auto Lymphocyte % : x  Auto Monocyte % : x  Auto Eosinophil % : x  Auto Basophil % : x    12-12    138  |  100  |  29<H>  ----------------------------<  113<H>  4.9   |  26  |  0.95    Ca    10.4      12 Dec 2018 06:20      PTT - ( 12 Dec 2018 06:20 )  PTT:32.4 SEC

## 2018-12-12 NOTE — PROGRESS NOTE ADULT - ASSESSMENT
67 yo F w pmhx of T2DM, HTN presenting with 1 month of LLE swelling and discoloration admitted for  presumed cellulitis       seen by id and abs stopped  monitor off abx per ID as per id      ·  Problem: HTN (hypertension).   : BP well controlled  c/w meds      ·  Problem: DM (diabetes mellitus).    : Blood glucose well controlled  C/w sliding scale insulin.       ·  Problem: Microcytic anemia.    likely KRIS,   heme f/u noted    ·  Problem: Ischemia of LLE - restarted Heparin gtt in view of ? OR plan for BKA, if BKA refused by pt will change back to [facility] for discharge planning  Rheum and Heme appreciated, vasculitis/APLS w/u neg  stopped a/c as per/vasc/rheum  Derm appreciated, no role of repeat skin bx at this time  MRA LLE  done results noted  possibly trial of HBO per Podiatry    2nd opinion Rheum and Heme noted,and following  cont pletal  Steroids taper per Rheum    Adrenal incidenteloma - MRI reviewed, appears to be adrenal adenoma, outpt Surg f/u      likely plan for amputation of toes or foot vs BKA LLE after complete demarcation  vascular to follow up for proposed sx    ipmn - GI appreciated, f/u CT outpt in 1 year    records sent to [facility], as daughter wishes pt to transfer there, but has been rejected    pt and family changing mind regarding BKA vs [facility] + HBO in attampt to salvage foot multiple times. c/w Heparin gtt for now in view of possible BKA Friday  ongoing discussion w/daughter and pt reg above plan

## 2018-12-12 NOTE — CHART NOTE - NSCHARTNOTEFT_GEN_A_CORE
Brief psychiatry consultation note:    Elaborate note to follow. Consultation request placed to assess capacity to refuse BKA.    Met with the patient. She is calm, alert and fully oriented. Contrary to information received from the team, patient was logical, able to state reasons for her admission, her rather prolonged admission. Frustrated that she was about to go to a rehab in [place] today, and now the plan has changed to 'surgery'. She knows she has 'foot gangrene'. She states that she has to get the surgery, is consenting to it. She understands the risks of not doing the surgery, and able to state the benefits of it as well.     Patient is seeking for more information from the vascular team about the risks of the surgery itself. She also requests that her daughter be involved in coordination of care.     At this point, patient is clear that she intends to do the surgery and is not refusing it. Please reconsult psychiatry if patient refuses surgery. It is advised that daughter be involved in this decision making as per patient's request.    Vinh Torrez MD  Attending psychiatrist Brief psychiatry consultation note:    Elaborate note to follow. Consultation request placed to assess capacity to refuse BKA.    Met with the patient. She is calm, alert and fully oriented. Contrary to information received from the team, patient was logical, able to state reasons for her admission, her rather prolonged admission. Frustrated that she was about to go to a rehab in [place] today, and now the plan has changed to 'surgery'. She knows she has 'foot gangrene'. She states that she has to get the surgery, is consenting to it. She understands the risks of not doing the surgery, and able to state the benefits of it as well.     Patient is seeking for more information from the vascular team about the risks of the surgery itself. She also requests that her daughter be involved in coordination of care.     At this point, patient is clear that she intends to do the surgery and is not refusing it. Please reconsult psychiatry with regards to capacity assessment, if patient refuses surgery. It is advised that daughter be involved in this decision making as per patient's request.    Vinh Torrez MD  Attending psychiatrist

## 2018-12-12 NOTE — PROGRESS NOTE ADULT - SUBJECTIVE AND OBJECTIVE BOX
VASCULAR SURGERY PROGRESS NOTE      Subjective:  No acute events overnight. Extensive conversation had with pt and daughter regarding need for L BKA.         Objective:    PE:  Gen: Laying in bed, in NAD  Resp: Nonlabored  Abd: soft, ND, NT  Ext:   LLE: Well demarcated dry gangrene to the forefoot. Dorsal skin with some bullae, no signs of infection or collection.      Vital Signs Last 24 Hrs  T(C): 36.8 (12 Dec 2018 05:04), Max: 36.8 (11 Dec 2018 12:28)  T(F): 98.2 (12 Dec 2018 05:04), Max: 98.3 (11 Dec 2018 12:28)  HR: 95 (12 Dec 2018 05:04) (95 - 99)  BP: 106/72 (12 Dec 2018 05:04) (105/64 - 106/72)  BP(mean): --  RR: 18 (12 Dec 2018 05:04) (17 - 18)  SpO2: 96% (12 Dec 2018 05:04) (96% - 98%)    I&O's Detail      Daily     Daily     MEDICATIONS  (STANDING):  cilostazol 100 milliGRAM(s) Oral two times a day  dextrose 5%. 1000 milliLiter(s) (50 mL/Hr) IV Continuous <Continuous>  dextrose 50% Injectable 12.5 Gram(s) IV Push once  gabapentin 200 milliGRAM(s) Oral three times a day  heparin  Infusion.  Unit(s)/Hr (12 mL/Hr) IV Continuous <Continuous>  insulin lispro (HumaLOG) corrective regimen sliding scale   SubCutaneous three times a day before meals  lidocaine   Patch 1 Patch Transdermal daily  lisinopril 10 milliGRAM(s) Oral daily  predniSONE   Tablet   Oral     MEDICATIONS  (PRN):  acetaminophen   Tablet .. 975 milliGRAM(s) Oral every 6 hours PRN Mild Pain (1 - 3)  acetaminophen   Tablet .. 650 milliGRAM(s) Oral every 6 hours PRN Temp greater or equal to 38C (100.4F)  dextrose 40% Gel 15 Gram(s) Oral once PRN Blood Glucose LESS THAN 70 milliGRAM(s)/deciliter  docusate sodium 100 milliGRAM(s) Oral three times a day PRN Constipation  glucagon  Injectable 1 milliGRAM(s) IntraMuscular once PRN Glucose LESS THAN 70 milligrams/deciliter  heparin  Injectable 5500 Unit(s) IV Push every 6 hours PRN For aPTT less than 40  heparin  Injectable 2500 Unit(s) IV Push every 6 hours PRN For aPTT between 40 - 57      LABS:                        10.0   11.09 )-----------( 459      ( 12 Dec 2018 06:20 )             33.5     12-12    138  |  100  |  29<H>  ----------------------------<  113<H>  4.9   |  26  |  0.95    Ca    10.4      12 Dec 2018 06:20      PTT - ( 12 Dec 2018 06:20 )  PTT:32.4 SEC      RADIOLOGY & ADDITIONAL STUDIES:

## 2018-12-12 NOTE — BEHAVIORAL HEALTH ASSESSMENT NOTE - NSBHCHARTREVIEWLAB_PSY_A_CORE FT
CBC Full  -  ( 13 Dec 2018 07:35 )  WBC Count : 11.59 K/uL  Hemoglobin : 10.3 g/dL  Hematocrit : 33.5 %  Platelet Count - Automated : 409 K/uL  Mean Cell Volume : 81.1 fL  Mean Cell Hemoglobin : 24.9 pg  Mean Cell Hemoglobin Concentration : 30.7 %  Auto Neutrophil # : x  Auto Lymphocyte # : x  Auto Monocyte # : x  Auto Eosinophil # : x  Auto Basophil # : x  Auto Neutrophil % : x  Auto Lymphocyte % : x  Auto Monocyte % : x  Auto Eosinophil % : x  Auto Basophil % : x  12-13    139  |  101  |  24<H>  ----------------------------<  145<H>  4.6   |  26  |  0.88    Ca    10.1      13 Dec 2018 07:35  Phos  2.9     12-13  Mg     2.1     12-13

## 2018-12-12 NOTE — BEHAVIORAL HEALTH ASSESSMENT NOTE - NSBHCHARTREVIEWVS_PSY_A_CORE FT
Vital Signs Last 24 Hrs  T(C): 36.4 (13 Dec 2018 12:52), Max: 36.9 (12 Dec 2018 20:49)  T(F): 97.5 (13 Dec 2018 12:52), Max: 98.5 (12 Dec 2018 20:49)  HR: 99 (13 Dec 2018 12:52) (84 - 99)  BP: 104/60 (13 Dec 2018 12:52) (104/60 - 111/75)  BP(mean): --  RR: 18 (13 Dec 2018 12:52) (18 - 18)  SpO2: 96% (13 Dec 2018 12:52) (96% - 98%)

## 2018-12-12 NOTE — PROGRESS NOTE ADULT - ATTENDING COMMENTS
Seen ex'ed dw'ed res  As above  CLinically stable  Now agreeable to L BKA- OR resched on Fri   Does not wish to embark on L foot slavage attempts due to prolonged timeline, logistics issues (unwilling to go to HBO) etc.  No longer requesting transfer.  DW'ed pt and family.

## 2018-12-12 NOTE — BEHAVIORAL HEALTH ASSESSMENT NOTE - SUMMARY
Briefly, the patient is a 68 year old woman, , has supportive adult daughters, is retired, has a medical history notable for DM, HTN, Breast cancer, has no prior known psychiatric history, presented to the ed on 11/14 with complaints of foot pain, swelling and redness. Patient was admitted for treatment of cellulitis. The initial plan was for the patient to go to rehab with outpatient bariatric oxygen treatment. Today vascular team changed the plan and it was decided that she required a BKA. Patient has been ambivalent about it, and so has the family. Hence this capacity evaluation request.   Please note that during evaluation today, patient is alert, oriented, calm, pleasant, makes good eye contact, answers logically and is engaged well in conversation. She denies any mood symptoms, denies any si or hi, denies any a/vh or paranoia when asked. Denies any substance abuse.   Contrary to information received from the team, patient was logical, able to state reasons for her admission, her rather prolonged admission. Frustrated that she was about to go to a rehab in [place] today, and now the plan has changed to 'surgery'. She knows she has 'foot gangrene'. She states that she has to get the surgery, is consenting to it. She understands the risks of not doing the surgery, and able to state the benefits of it as well.     At this point, patient is clear that she intends to do the surgery and is not refusing it. Please reconsult psychiatry with regards to capacity assessment, if patient refuses surgery. It is advised that daughter be involved in this decision making as per patient's request.    Patient is seeking for more information from the vascular team about the risks of the surgery itself. She also requests that her daughter be involved in coordination of care.

## 2018-12-13 DIAGNOSIS — F43.29 ADJUSTMENT DISORDER WITH OTHER SYMPTOMS: ICD-10-CM

## 2018-12-13 LAB
APTT BLD: 103.3 SEC — HIGH (ref 27.5–36.3)
APTT BLD: 49.8 SEC — HIGH (ref 27.5–36.3)
APTT BLD: 95.3 SEC — HIGH (ref 27.5–36.3)
BLD GP AB SCN SERPL QL: NEGATIVE — SIGNIFICANT CHANGE UP
BUN SERPL-MCNC: 24 MG/DL — HIGH (ref 7–23)
CALCIUM SERPL-MCNC: 10.1 MG/DL — SIGNIFICANT CHANGE UP (ref 8.4–10.5)
CHLORIDE SERPL-SCNC: 101 MMOL/L — SIGNIFICANT CHANGE UP (ref 98–107)
CO2 SERPL-SCNC: 26 MMOL/L — SIGNIFICANT CHANGE UP (ref 22–31)
CREAT SERPL-MCNC: 0.88 MG/DL — SIGNIFICANT CHANGE UP (ref 0.5–1.3)
GLUCOSE BLDC GLUCOMTR-MCNC: 104 MG/DL — HIGH (ref 70–99)
GLUCOSE BLDC GLUCOMTR-MCNC: 110 MG/DL — HIGH (ref 70–99)
GLUCOSE BLDC GLUCOMTR-MCNC: 188 MG/DL — HIGH (ref 70–99)
GLUCOSE BLDC GLUCOMTR-MCNC: 191 MG/DL — HIGH (ref 70–99)
GLUCOSE SERPL-MCNC: 145 MG/DL — HIGH (ref 70–99)
HCT VFR BLD CALC: 33.5 % — LOW (ref 34.5–45)
HGB BLD-MCNC: 10.3 G/DL — LOW (ref 11.5–15.5)
INR BLD: 1.02 — SIGNIFICANT CHANGE UP (ref 0.88–1.17)
MAGNESIUM SERPL-MCNC: 2.1 MG/DL — SIGNIFICANT CHANGE UP (ref 1.6–2.6)
MCHC RBC-ENTMCNC: 24.9 PG — LOW (ref 27–34)
MCHC RBC-ENTMCNC: 30.7 % — LOW (ref 32–36)
MCV RBC AUTO: 81.1 FL — SIGNIFICANT CHANGE UP (ref 80–100)
NRBC # FLD: 0 — SIGNIFICANT CHANGE UP
PHOSPHATE SERPL-MCNC: 2.9 MG/DL — SIGNIFICANT CHANGE UP (ref 2.5–4.5)
PLATELET # BLD AUTO: 409 K/UL — HIGH (ref 150–400)
PMV BLD: 9.9 FL — SIGNIFICANT CHANGE UP (ref 7–13)
POTASSIUM SERPL-MCNC: 4.6 MMOL/L — SIGNIFICANT CHANGE UP (ref 3.5–5.3)
POTASSIUM SERPL-SCNC: 4.6 MMOL/L — SIGNIFICANT CHANGE UP (ref 3.5–5.3)
PROTHROM AB SERPL-ACNC: 11.6 SEC — SIGNIFICANT CHANGE UP (ref 9.8–13.1)
RBC # BLD: 4.13 M/UL — SIGNIFICANT CHANGE UP (ref 3.8–5.2)
RBC # FLD: 22.3 % — HIGH (ref 10.3–14.5)
RH IG SCN BLD-IMP: POSITIVE — SIGNIFICANT CHANGE UP
SODIUM SERPL-SCNC: 139 MMOL/L — SIGNIFICANT CHANGE UP (ref 135–145)
WBC # BLD: 11.59 K/UL — HIGH (ref 3.8–10.5)
WBC # FLD AUTO: 11.59 K/UL — HIGH (ref 3.8–10.5)

## 2018-12-13 PROCEDURE — 93010 ELECTROCARDIOGRAM REPORT: CPT

## 2018-12-13 RX ADMIN — HEPARIN SODIUM 1000 UNIT(S)/HR: 5000 INJECTION INTRAVENOUS; SUBCUTANEOUS at 05:02

## 2018-12-13 RX ADMIN — GABAPENTIN 200 MILLIGRAM(S): 400 CAPSULE ORAL at 21:23

## 2018-12-13 RX ADMIN — GABAPENTIN 200 MILLIGRAM(S): 400 CAPSULE ORAL at 05:13

## 2018-12-13 RX ADMIN — Medication 1: at 13:36

## 2018-12-13 RX ADMIN — GABAPENTIN 200 MILLIGRAM(S): 400 CAPSULE ORAL at 13:34

## 2018-12-13 RX ADMIN — CILOSTAZOL 100 MILLIGRAM(S): 100 TABLET ORAL at 17:41

## 2018-12-13 RX ADMIN — LISINOPRIL 10 MILLIGRAM(S): 2.5 TABLET ORAL at 05:07

## 2018-12-13 RX ADMIN — Medication 1: at 08:45

## 2018-12-13 RX ADMIN — HEPARIN SODIUM 900 UNIT(S)/HR: 5000 INJECTION INTRAVENOUS; SUBCUTANEOUS at 13:33

## 2018-12-13 RX ADMIN — Medication 20 MILLIGRAM(S): at 05:13

## 2018-12-13 RX ADMIN — CILOSTAZOL 100 MILLIGRAM(S): 100 TABLET ORAL at 05:07

## 2018-12-13 RX ADMIN — HEPARIN SODIUM 900 UNIT(S)/HR: 5000 INJECTION INTRAVENOUS; SUBCUTANEOUS at 21:22

## 2018-12-13 NOTE — PROGRESS NOTE ADULT - SUBJECTIVE AND OBJECTIVE BOX
RENATA DIXON:[id number],   68yFemale followed for:  No Known Allergies    PAST MEDICAL & SURGICAL HISTORY:  DM (diabetes mellitus)  HTN (hypertension)  Breast CA  No significant past surgical history    FAMILY HISTORY:  No pertinent family history in first degree relatives    MEDICATIONS  (STANDING):  cilostazol 100 milliGRAM(s) Oral two times a day  dextrose 5%. 1000 milliLiter(s) (50 mL/Hr) IV Continuous <Continuous>  dextrose 50% Injectable 12.5 Gram(s) IV Push once  gabapentin 200 milliGRAM(s) Oral three times a day  heparin  Infusion.  Unit(s)/Hr (12 mL/Hr) IV Continuous <Continuous>  insulin lispro (HumaLOG) corrective regimen sliding scale   SubCutaneous three times a day before meals  lidocaine   Patch 1 Patch Transdermal daily  lisinopril 10 milliGRAM(s) Oral daily  predniSONE   Tablet 20 milliGRAM(s) Oral daily  predniSONE   Tablet   Oral     MEDICATIONS  (PRN):  acetaminophen   Tablet .. 975 milliGRAM(s) Oral every 6 hours PRN Mild Pain (1 - 3)  acetaminophen   Tablet .. 650 milliGRAM(s) Oral every 6 hours PRN Temp greater or equal to 38C (100.4F)  dextrose 40% Gel 15 Gram(s) Oral once PRN Blood Glucose LESS THAN 70 milliGRAM(s)/deciliter  docusate sodium 100 milliGRAM(s) Oral three times a day PRN Constipation  glucagon  Injectable 1 milliGRAM(s) IntraMuscular once PRN Glucose LESS THAN 70 milligrams/deciliter  heparin  Injectable 5500 Unit(s) IV Push every 6 hours PRN For aPTT less than 40  heparin  Injectable 2500 Unit(s) IV Push every 6 hours PRN For aPTT between 40 - 57      Vital Signs Last 24 Hrs  T(C): 36.7 (13 Dec 2018 05:04), Max: 36.9 (12 Dec 2018 20:49)  T(F): 98 (13 Dec 2018 05:04), Max: 98.5 (12 Dec 2018 20:49)  HR: 84 (13 Dec 2018 05:04) (84 - 99)  BP: 111/75 (13 Dec 2018 05:04) (99/66 - 111/75)  BP(mean): --  RR: 18 (13 Dec 2018 05:04) (17 - 18)  SpO2: 98% (13 Dec 2018 05:04) (95% - 98%)  nc/at  s1s2  cta  soft, nt, nd no guarding or rebound  no c/c/e    CBC Full  -  ( 13 Dec 2018 07:35 )  WBC Count : 11.59 K/uL  Hemoglobin : 10.3 g/dL  Hematocrit : 33.5 %  Platelet Count - Automated : 409 K/uL  Mean Cell Volume : 81.1 fL  Mean Cell Hemoglobin : 24.9 pg  Mean Cell Hemoglobin Concentration : 30.7 %  Auto Neutrophil # : x  Auto Lymphocyte # : x  Auto Monocyte # : x  Auto Eosinophil # : x  Auto Basophil # : x  Auto Neutrophil % : x  Auto Lymphocyte % : x  Auto Monocyte % : x  Auto Eosinophil % : x  Auto Basophil % : x    12-12    138  |  100  |  29<H>  ----------------------------<  113<H>  4.9   |  26  |  0.95    Ca    10.4      12 Dec 2018 06:20      PT/INR - ( 13 Dec 2018 04:20 )   PT: 11.6 SEC;   INR: 1.02          PTT - ( 13 Dec 2018 04:20 )  PTT:49.8 SEC

## 2018-12-13 NOTE — PROGRESS NOTE ADULT - ASSESSMENT
68 year old F with Left foot gangrene  -Pt seen and evaluated bedside  - Left foot forefoot gangrene stable, no clinical signs of infection.   - Pt now agreeable to LF BKA scheduled for tomorrow 12/14/18  - No pod surgical intervention at this time.   - Dressed with Xeroform and DSD, to be performed daily.   - Will continue to follow until possible BKA.   - Discussed w/ attending

## 2018-12-13 NOTE — PROGRESS NOTE ADULT - ASSESSMENT
67 yo woman with a hx of DM, HTN and breast CA s/p lumpectomy presented with left foot pain, swelling and redness. CTA obtained which showed patent vessels to ankles. Currently on medicine service undergoing work up for hypercoagulability / vasculitis.    - Patient now agreeable to BKA - consent signed in chart  - Plan for OR tomorrow. Please keep NPOpMN  - Hold eliquis for now, transition to heparin gtt. Patient will need to be off of eliquis for ~48 hours prior to any OR.     INO Torres PGY2  Surgery, C-Team   Pager: [phone]

## 2018-12-13 NOTE — PROGRESS NOTE ADULT - SUBJECTIVE AND OBJECTIVE BOX
Patient is a 68y old  Female who presents with a chief complaint of Left foot cellulitis (13 Dec 2018 08:22)       INTERVAL HPI/OVERNIGHT EVENTS:  Patient seen and evaluated at bedside.  Pt is resting comfortable in NAD. Denies N/V/F/C.      Allergies    No Known Allergies    Intolerances        Vital Signs Last 24 Hrs  T(C): 36.7 (13 Dec 2018 05:04), Max: 36.9 (12 Dec 2018 20:49)  T(F): 98 (13 Dec 2018 05:04), Max: 98.5 (12 Dec 2018 20:49)  HR: 84 (13 Dec 2018 05:04) (84 - 99)  BP: 111/75 (13 Dec 2018 05:04) (99/66 - 111/75)  BP(mean): --  RR: 18 (13 Dec 2018 05:04) (17 - 18)  SpO2: 98% (13 Dec 2018 05:04) (95% - 98%)    LABS:                        10.3   11.59 )-----------( 409      ( 13 Dec 2018 07:35 )             33.5     12-13    139  |  101  |  24<H>  ----------------------------<  145<H>  4.6   |  26  |  0.88    Ca    10.1      13 Dec 2018 07:35  Phos  2.9     12-13  Mg     2.1     12-13      PT/INR - ( 13 Dec 2018 04:20 )   PT: 11.6 SEC;   INR: 1.02          PTT - ( 13 Dec 2018 04:20 )  PTT:49.8 SEC    CAPILLARY BLOOD GLUCOSE      POCT Blood Glucose.: 188 mg/dL (13 Dec 2018 08:36)  POCT Blood Glucose.: 136 mg/dL (12 Dec 2018 22:32)  POCT Blood Glucose.: 106 mg/dL (12 Dec 2018 17:20)  POCT Blood Glucose.: 223 mg/dL (12 Dec 2018 12:38)      Lower Extremity Physical Exam:  Left foot with well demarcated gangrene to the midfoot primarily plantarly which is full thickness.  Dorsal skin is partial thickness ruptured intraepidermal bulla proximally with overlying skin sloughing, fibrotic wound bed underlying to dermis. There are no signs of infection or collection.  No edema or erythema noted and no cellulitis, no fluctuance, no purulence, no malodor.     RADIOLOGY & ADDITIONAL TESTS:

## 2018-12-13 NOTE — PROGRESS NOTE ADULT - SUBJECTIVE AND OBJECTIVE BOX
CHIEF COMPLAINT:Patient is a 68y old  Female who presents with a chief complaint of Left foot cellulitis (01 Dec 2018 09:07)  no acute events    PAST MEDICAL & SURGICAL HISTORY:  DM (diabetes mellitus)  HTN (hypertension)  Breast CA  No significant past surgical history          REVIEW OF SYSTEMS:  CONSTITUTIONAL: No fever, weight loss, or fatigue  EYES: No eye pain, visual disturbances, or discharge  NECK: No pain or stiffness  RESPIRATORY: No cough, wheezing, chills or hemoptysis; No Shortness of Breath  CARDIOVASCULAR: No chest pain, palpitations, passing out, dizziness,  GASTROINTESTINAL: No abdominal or epigastric pain. No nausea, vomiting, or hematemesis; No diarrhea or constipation. No melena or hematochezia.  GENITOURINARY: No dysuria, frequency, hematuria, or incontinence  NEUROLOGICAL: No headaches  l foot pain controlled      Medications:  MEDICATIONS  (STANDING):  cilostazol 100 milliGRAM(s) Oral two times a day  dextrose 5%. 1000 milliLiter(s) (50 mL/Hr) IV Continuous <Continuous>  dextrose 50% Injectable 12.5 Gram(s) IV Push once  gabapentin 200 milliGRAM(s) Oral three times a day  heparin  Infusion.  Unit(s)/Hr (12 mL/Hr) IV Continuous <Continuous>  insulin lispro (HumaLOG) corrective regimen sliding scale   SubCutaneous three times a day before meals  lidocaine   Patch 1 Patch Transdermal daily  lisinopril 10 milliGRAM(s) Oral daily  predniSONE   Tablet 20 milliGRAM(s) Oral daily  predniSONE   Tablet   Oral     MEDICATIONS  (PRN):  acetaminophen   Tablet .. 975 milliGRAM(s) Oral every 6 hours PRN Mild Pain (1 - 3)  acetaminophen   Tablet .. 650 milliGRAM(s) Oral every 6 hours PRN Temp greater or equal to 38C (100.4F)  dextrose 40% Gel 15 Gram(s) Oral once PRN Blood Glucose LESS THAN 70 milliGRAM(s)/deciliter  docusate sodium 100 milliGRAM(s) Oral three times a day PRN Constipation  glucagon  Injectable 1 milliGRAM(s) IntraMuscular once PRN Glucose LESS THAN 70 milligrams/deciliter  heparin  Injectable 5500 Unit(s) IV Push every 6 hours PRN For aPTT less than 40  heparin  Injectable 2500 Unit(s) IV Push every 6 hours PRN For aPTT between 40 - 57    	    PHYSICAL EXAM:  T(C): 36.4 (12-13-18 @ 12:52), Max: 36.9 (12-12-18 @ 20:49)  HR: 99 (12-13-18 @ 12:52) (84 - 99)  BP: 104/60 (12-13-18 @ 12:52) (104/60 - 111/75)  RR: 18 (12-13-18 @ 12:52) (18 - 18)  SpO2: 96% (12-13-18 @ 12:52) (96% - 98%)  Wt(kg): --  I&O's Summary    Appearance: Normal	  HEENT:   Normal oral mucosa, PERRL, EOMI	  Lymphatic: No lymphadenopathy  Cardiovascular: Normal S1 S2, No JVD, No murmurs  Respiratory: Lungs clear to auscultation	  Psychiatry: A & O x 3,  Gastrointestinal:  Soft, Non-tender, + BS		  Neurologic: Non-focal  Extremities:l , dressed    LABS:	 	    CARDIAC MARKERS:                                10.3   11.59 )-----------( 409      ( 13 Dec 2018 07:35 )             33.5     12-13    139  |  101  |  24<H>  ----------------------------<  145<H>  4.6   |  26  |  0.88    Ca    10.1      13 Dec 2018 07:35  Phos  2.9     12-13  Mg     2.1     12-13      proBNP:   Lipid Profile:   HgA1c:   TSH:

## 2018-12-13 NOTE — PROGRESS NOTE ADULT - SUBJECTIVE AND OBJECTIVE BOX
VASCULAR SURGERY PROGRESS NOTE      Subjective:  No acute events overnight      Objective:    PE:  Gen: Laying in bed, in NAD  Resp: Nonlabored  Abd: soft, ND, NT  Ext:   LLE: Well demarcated dry gangrene to the forefoot. Dorsal skin with some bullae, no signs of infection or collection.     Vital Signs Last 24 Hrs  T(C): 36.7 (13 Dec 2018 05:04), Max: 36.9 (12 Dec 2018 20:49)  T(F): 98 (13 Dec 2018 05:04), Max: 98.5 (12 Dec 2018 20:49)  HR: 84 (13 Dec 2018 05:04) (84 - 99)  BP: 111/75 (13 Dec 2018 05:04) (99/66 - 111/75)  BP(mean): --  RR: 18 (13 Dec 2018 05:04) (17 - 18)  SpO2: 98% (13 Dec 2018 05:04) (95% - 98%)    I&O's Detail      Daily     Daily     MEDICATIONS  (STANDING):  cilostazol 100 milliGRAM(s) Oral two times a day  dextrose 5%. 1000 milliLiter(s) (50 mL/Hr) IV Continuous <Continuous>  dextrose 50% Injectable 12.5 Gram(s) IV Push once  gabapentin 200 milliGRAM(s) Oral three times a day  heparin  Infusion.  Unit(s)/Hr (12 mL/Hr) IV Continuous <Continuous>  insulin lispro (HumaLOG) corrective regimen sliding scale   SubCutaneous three times a day before meals  lidocaine   Patch 1 Patch Transdermal daily  lisinopril 10 milliGRAM(s) Oral daily  predniSONE   Tablet 20 milliGRAM(s) Oral daily  predniSONE   Tablet   Oral     MEDICATIONS  (PRN):  acetaminophen   Tablet .. 975 milliGRAM(s) Oral every 6 hours PRN Mild Pain (1 - 3)  acetaminophen   Tablet .. 650 milliGRAM(s) Oral every 6 hours PRN Temp greater or equal to 38C (100.4F)  dextrose 40% Gel 15 Gram(s) Oral once PRN Blood Glucose LESS THAN 70 milliGRAM(s)/deciliter  docusate sodium 100 milliGRAM(s) Oral three times a day PRN Constipation  glucagon  Injectable 1 milliGRAM(s) IntraMuscular once PRN Glucose LESS THAN 70 milligrams/deciliter  heparin  Injectable 5500 Unit(s) IV Push every 6 hours PRN For aPTT less than 40  heparin  Injectable 2500 Unit(s) IV Push every 6 hours PRN For aPTT between 40 - 57      LABS:                        10.3   11.59 )-----------( 409      ( 13 Dec 2018 07:35 )             33.5     12-13    139  |  101  |  24<H>  ----------------------------<  145<H>  4.6   |  26  |  0.88    Ca    10.1      13 Dec 2018 07:35  Phos  2.9     12-13  Mg     2.1     12-13      PT/INR - ( 13 Dec 2018 04:20 )   PT: 11.6 SEC;   INR: 1.02          PTT - ( 13 Dec 2018 04:20 )  PTT:49.8 SEC      RADIOLOGY & ADDITIONAL STUDIES:

## 2018-12-13 NOTE — PROVIDER CONTACT NOTE (OTHER) - RECOMMENDATIONS
I spoke with the patient about her concerns and educated her on her decision
Pause heparin drip for one hour and redraw APTT in one hour.
Provider to contact vascular regarding change in plan of care
wean off oxygen and continue to monitor

## 2018-12-13 NOTE — CHART NOTE - NSCHARTNOTEFT_GEN_A_CORE
Spoke to vascular team - Patient on heparin gtt and planned for BKA 12/14 - Was told that vascular team will reach out to primary team with instructions regarding when to discontinue heparin gtt in the morning - Will order labs for AM and keep patient NPO past midnight

## 2018-12-14 VITALS
HEART RATE: 110 BPM | OXYGEN SATURATION: 100 % | DIASTOLIC BLOOD PRESSURE: 65 MMHG | SYSTOLIC BLOOD PRESSURE: 107 MMHG | TEMPERATURE: 98 F | RESPIRATION RATE: 17 BRPM

## 2018-12-14 LAB
ALBUMIN SERPL ELPH-MCNC: 3.1 G/DL — LOW (ref 3.3–5)
ALP SERPL-CCNC: 65 U/L — SIGNIFICANT CHANGE UP (ref 40–120)
ALT FLD-CCNC: 6 U/L — SIGNIFICANT CHANGE UP (ref 4–33)
APTT BLD: 93.2 SEC — HIGH (ref 27.5–36.3)
AST SERPL-CCNC: 7 U/L — SIGNIFICANT CHANGE UP (ref 4–32)
BILIRUB SERPL-MCNC: 0.3 MG/DL — SIGNIFICANT CHANGE UP (ref 0.2–1.2)
BUN SERPL-MCNC: 25 MG/DL — HIGH (ref 7–23)
CALCIUM SERPL-MCNC: 10.1 MG/DL — SIGNIFICANT CHANGE UP (ref 8.4–10.5)
CHLORIDE SERPL-SCNC: 102 MMOL/L — SIGNIFICANT CHANGE UP (ref 98–107)
CO2 SERPL-SCNC: 26 MMOL/L — SIGNIFICANT CHANGE UP (ref 22–31)
CREAT SERPL-MCNC: 0.93 MG/DL — SIGNIFICANT CHANGE UP (ref 0.5–1.3)
GLUCOSE BLDC GLUCOMTR-MCNC: 134 MG/DL — HIGH (ref 70–99)
GLUCOSE BLDC GLUCOMTR-MCNC: 228 MG/DL — HIGH (ref 70–99)
GLUCOSE SERPL-MCNC: 102 MG/DL — HIGH (ref 70–99)
HCT VFR BLD CALC: 33.8 % — LOW (ref 34.5–45)
HGB BLD-MCNC: 10.2 G/DL — LOW (ref 11.5–15.5)
INR BLD: 1.1 — SIGNIFICANT CHANGE UP (ref 0.88–1.17)
MAGNESIUM SERPL-MCNC: 2.2 MG/DL — SIGNIFICANT CHANGE UP (ref 1.6–2.6)
MCHC RBC-ENTMCNC: 24.7 PG — LOW (ref 27–34)
MCHC RBC-ENTMCNC: 30.2 % — LOW (ref 32–36)
MCV RBC AUTO: 81.8 FL — SIGNIFICANT CHANGE UP (ref 80–100)
NRBC # FLD: 0 — SIGNIFICANT CHANGE UP
PHOSPHATE SERPL-MCNC: 2.7 MG/DL — SIGNIFICANT CHANGE UP (ref 2.5–4.5)
PLATELET # BLD AUTO: 402 K/UL — HIGH (ref 150–400)
PMV BLD: 9.5 FL — SIGNIFICANT CHANGE UP (ref 7–13)
POTASSIUM SERPL-MCNC: 4.5 MMOL/L — SIGNIFICANT CHANGE UP (ref 3.5–5.3)
POTASSIUM SERPL-SCNC: 4.5 MMOL/L — SIGNIFICANT CHANGE UP (ref 3.5–5.3)
PROT SERPL-MCNC: 6.2 G/DL — SIGNIFICANT CHANGE UP (ref 6–8.3)
PROTHROM AB SERPL-ACNC: 12.2 SEC — SIGNIFICANT CHANGE UP (ref 9.8–13.1)
RBC # BLD: 4.13 M/UL — SIGNIFICANT CHANGE UP (ref 3.8–5.2)
RBC # FLD: 22.5 % — HIGH (ref 10.3–14.5)
SODIUM SERPL-SCNC: 139 MMOL/L — SIGNIFICANT CHANGE UP (ref 135–145)
WBC # BLD: 12 K/UL — HIGH (ref 3.8–10.5)
WBC # FLD AUTO: 12 K/UL — HIGH (ref 3.8–10.5)

## 2018-12-14 RX ORDER — LISINOPRIL 2.5 MG/1
1 TABLET ORAL
Qty: 0 | Refills: 0 | COMMUNITY

## 2018-12-14 RX ORDER — APIXABAN 2.5 MG/1
1 TABLET, FILM COATED ORAL
Qty: 0 | Refills: 0 | COMMUNITY
Start: 2018-12-14

## 2018-12-14 RX ORDER — DOCUSATE SODIUM 100 MG
1 CAPSULE ORAL
Qty: 0 | Refills: 0 | COMMUNITY
Start: 2018-12-14

## 2018-12-14 RX ORDER — APIXABAN 2.5 MG/1
5 TABLET, FILM COATED ORAL EVERY 12 HOURS
Qty: 0 | Refills: 0 | Status: DISCONTINUED | OUTPATIENT
Start: 2018-12-14 | End: 2018-12-14

## 2018-12-14 RX ADMIN — HEPARIN SODIUM 900 UNIT(S)/HR: 5000 INJECTION INTRAVENOUS; SUBCUTANEOUS at 04:10

## 2018-12-14 RX ADMIN — LISINOPRIL 10 MILLIGRAM(S): 2.5 TABLET ORAL at 05:30

## 2018-12-14 RX ADMIN — GABAPENTIN 200 MILLIGRAM(S): 400 CAPSULE ORAL at 14:30

## 2018-12-14 RX ADMIN — Medication 2: at 13:28

## 2018-12-14 RX ADMIN — CILOSTAZOL 100 MILLIGRAM(S): 100 TABLET ORAL at 05:30

## 2018-12-14 RX ADMIN — Medication 20 MILLIGRAM(S): at 05:30

## 2018-12-14 RX ADMIN — GABAPENTIN 200 MILLIGRAM(S): 400 CAPSULE ORAL at 05:33

## 2018-12-14 NOTE — PROVIDER CONTACT NOTE (OTHER) - SITUATION
Pt states she no longer wants to have BKA surgery in the AM
Patient APTT is 114.2
Patient oxygen saturation 90% on RA, temp 97.4 abnormal from baseline. Currently 95% on 3 L O2.
Pt's APTT came back 95.3 sec
Pt's APTT came back at 93.2 seconds which is the 2nd therapeutic value for her heparin drip
Received call from [employer] - pt schd for sx 12/12/18; needs active type and screen

## 2018-12-14 NOTE — PROGRESS NOTE ADULT - SUBJECTIVE AND OBJECTIVE BOX
RENATA DIXON:[id number],   68yFemale followed for:  No Known Allergies    PAST MEDICAL & SURGICAL HISTORY:  DM (diabetes mellitus)  HTN (hypertension)  Breast CA  No significant past surgical history    FAMILY HISTORY:  No pertinent family history in first degree relatives    MEDICATIONS  (STANDING):  cilostazol 100 milliGRAM(s) Oral two times a day  dextrose 5%. 1000 milliLiter(s) (50 mL/Hr) IV Continuous <Continuous>  dextrose 50% Injectable 12.5 Gram(s) IV Push once  gabapentin 200 milliGRAM(s) Oral three times a day  heparin  Infusion.  Unit(s)/Hr (12 mL/Hr) IV Continuous <Continuous>  insulin lispro (HumaLOG) corrective regimen sliding scale   SubCutaneous three times a day before meals  lidocaine   Patch 1 Patch Transdermal daily  lisinopril 10 milliGRAM(s) Oral daily  predniSONE   Tablet 20 milliGRAM(s) Oral daily  predniSONE   Tablet   Oral     MEDICATIONS  (PRN):  acetaminophen   Tablet .. 975 milliGRAM(s) Oral every 6 hours PRN Mild Pain (1 - 3)  acetaminophen   Tablet .. 650 milliGRAM(s) Oral every 6 hours PRN Temp greater or equal to 38C (100.4F)  dextrose 40% Gel 15 Gram(s) Oral once PRN Blood Glucose LESS THAN 70 milliGRAM(s)/deciliter  docusate sodium 100 milliGRAM(s) Oral three times a day PRN Constipation  glucagon  Injectable 1 milliGRAM(s) IntraMuscular once PRN Glucose LESS THAN 70 milligrams/deciliter  heparin  Injectable 5500 Unit(s) IV Push every 6 hours PRN For aPTT less than 40  heparin  Injectable 2500 Unit(s) IV Push every 6 hours PRN For aPTT between 40 - 57      Vital Signs Last 24 Hrs  T(C): 36.9 (14 Dec 2018 05:26), Max: 36.9 (14 Dec 2018 05:26)  T(F): 98.4 (14 Dec 2018 05:26), Max: 98.4 (14 Dec 2018 05:26)  HR: 85 (14 Dec 2018 05:26) (85 - 99)  BP: 115/76 (14 Dec 2018 05:26) (104/60 - 122/76)  BP(mean): --  RR: 18 (14 Dec 2018 05:26) (18 - 18)  SpO2: 100% (14 Dec 2018 05:26) (96% - 100%)  nc/at  s1s2  cta  soft, nt, nd no guarding or rebound  no c/c/e    CBC Full  -  ( 14 Dec 2018 03:10 )  WBC Count : 12.00 K/uL  Hemoglobin : 10.2 g/dL  Hematocrit : 33.8 %  Platelet Count - Automated : 402 K/uL  Mean Cell Volume : 81.8 fL  Mean Cell Hemoglobin : 24.7 pg  Mean Cell Hemoglobin Concentration : 30.2 %  Auto Neutrophil # : x  Auto Lymphocyte # : x  Auto Monocyte # : x  Auto Eosinophil # : x  Auto Basophil # : x  Auto Neutrophil % : x  Auto Lymphocyte % : x  Auto Monocyte % : x  Auto Eosinophil % : x  Auto Basophil % : x    12-14    139  |  102  |  25<H>  ----------------------------<  102<H>  4.5   |  26  |  0.93    Ca    10.1      14 Dec 2018 03:10  Phos  2.7     12-14  Mg     2.2     12-14    TPro  6.2  /  Alb  3.1<L>  /  TBili  0.3  /  DBili  x   /  AST  7   /  ALT  6   /  AlkPhos  65  12-14    PT/INR - ( 14 Dec 2018 03:10 )   PT: 12.2 SEC;   INR: 1.10          PTT - ( 14 Dec 2018 03:10 )  PTT:93.2 SEC

## 2018-12-14 NOTE — PROVIDER CONTACT NOTE (OTHER) - ASSESSMENT
Breast CA, heperin gtt, alert and oriented, admitted for left cellulitis.
Pt is frustrated and fearful
NAD
Patient asymptomatic.
Pt is asymptomatic
Pt is asymptomatic
patient AAOx4, no s/s of respiratory distress noted.

## 2018-12-14 NOTE — PROGRESS NOTE ADULT - SUBJECTIVE AND OBJECTIVE BOX
CHIEF COMPLAINT:Patient is a 68y old  Female who presents with a chief complaint of Left foot cellulitis (01 Dec 2018 09:07)  no acute events    PAST MEDICAL & SURGICAL HISTORY:  DM (diabetes mellitus)  HTN (hypertension)  Breast CA  No significant past surgical history          REVIEW OF SYSTEMS:  CONSTITUTIONAL: No fever, weight loss, or fatigue  EYES: No eye pain, visual disturbances, or discharge  NECK: No pain or stiffness  RESPIRATORY: No cough, wheezing, chills or hemoptysis; No Shortness of Breath  CARDIOVASCULAR: No chest pain, palpitations, passing out, dizziness,  GASTROINTESTINAL: No abdominal or epigastric pain. No nausea, vomiting, or hematemesis; No diarrhea or constipation. No melena or hematochezia.  GENITOURINARY: No dysuria, frequency, hematuria, or incontinence  NEUROLOGICAL: No headaches  l foot pain controlled      Medications:  MEDICATIONS  (STANDING):  cilostazol 100 milliGRAM(s) Oral two times a day  dextrose 5%. 1000 milliLiter(s) (50 mL/Hr) IV Continuous <Continuous>  dextrose 50% Injectable 12.5 Gram(s) IV Push once  gabapentin 200 milliGRAM(s) Oral three times a day  heparin  Infusion.  Unit(s)/Hr (12 mL/Hr) IV Continuous <Continuous>  insulin lispro (HumaLOG) corrective regimen sliding scale   SubCutaneous three times a day before meals  lidocaine   Patch 1 Patch Transdermal daily  lisinopril 10 milliGRAM(s) Oral daily  predniSONE   Tablet 20 milliGRAM(s) Oral daily  predniSONE   Tablet   Oral     MEDICATIONS  (PRN):  acetaminophen   Tablet .. 975 milliGRAM(s) Oral every 6 hours PRN Mild Pain (1 - 3)  acetaminophen   Tablet .. 650 milliGRAM(s) Oral every 6 hours PRN Temp greater or equal to 38C (100.4F)  dextrose 40% Gel 15 Gram(s) Oral once PRN Blood Glucose LESS THAN 70 milliGRAM(s)/deciliter  docusate sodium 100 milliGRAM(s) Oral three times a day PRN Constipation  glucagon  Injectable 1 milliGRAM(s) IntraMuscular once PRN Glucose LESS THAN 70 milligrams/deciliter  heparin  Injectable 5500 Unit(s) IV Push every 6 hours PRN For aPTT less than 40  heparin  Injectable 2500 Unit(s) IV Push every 6 hours PRN For aPTT between 40 - 57    	    PHYSICAL EXAM:  T(C): 36.9 (12-14-18 @ 05:26), Max: 36.9 (12-14-18 @ 05:26)  HR: 85 (12-14-18 @ 05:26) (85 - 99)  BP: 115/76 (12-14-18 @ 05:26) (104/60 - 122/76)  RR: 18 (12-14-18 @ 05:26) (18 - 18)  SpO2: 100% (12-14-18 @ 05:26) (96% - 100%)  Wt(kg): --  I&O's Summary    Appearance: Normal	  HEENT:   Normal oral mucosa, PERRL, EOMI	  Lymphatic: No lymphadenopathy  Cardiovascular: Normal S1 S2, No JVD, No murmurs  Respiratory: Lungs clear to auscultation	  Psychiatry: A & O x 3,  Gastrointestinal:  Soft, Non-tender, + BS		  Neurologic: Non-focal  Extremities:l , dressed    LABS:	 	    CARDIAC MARKERS:                                10.2   12.00 )-----------( 402      ( 14 Dec 2018 03:10 )             33.8     12-14    139  |  102  |  25<H>  ----------------------------<  102<H>  4.5   |  26  |  0.93    Ca    10.1      14 Dec 2018 03:10  Phos  2.7     12-14  Mg     2.2     12-14    TPro  6.2  /  Alb  3.1<L>  /  TBili  0.3  /  DBili  x   /  AST  7   /  ALT  6   /  AlkPhos  65  12-14    proBNP:   Lipid Profile:   HgA1c:   TSH:

## 2018-12-14 NOTE — PROGRESS NOTE ADULT - ASSESSMENT
69 yo F w pmhx of T2DM, HTN presenting with 1 month of LLE swelling and discoloration admitted for  presumed cellulitis       seen by id and abs stopped  monitor off abx per ID as per id      ·  Problem: HTN (hypertension).   : BP well controlled  c/w meds      ·  Problem: DM (diabetes mellitus).    : Blood glucose well controlled  C/w sliding scale insulin.       ·  Problem: Microcytic anemia.    likely KRIS,   heme f/u noted    ·  Problem: Ischemia of LLE - restarted Heparin gtt in view of ? OR plan for BKA, if BKA refused by pt will change back to [facility] for discharge planning  Rheum and Heme appreciated, vasculitis/APLS w/u neg  stopped a/c as per/vasc/rheum  Derm appreciated, no role of repeat skin bx at this time  MRA LLE  done results noted  possibly trial of HBO per Podiatry    2nd opinion Rheum and Heme noted,and following  cont pletal  Steroids taper per Rheum    Adrenal incidenteloma - MRI reviewed, appears to be adrenal adenoma, outpt Surg f/u      likely plan for amputation of toes or foot vs BKA LLE after complete demarcation  vascular to follow up for proposed sx    ipmn - GI appreciated, f/u CT outpt in 1 year    records sent to [facility], as daughter wishes pt to transfer there, but has been rejected    pt refused BKA intervention again. working on [facility] discharge.

## 2018-12-14 NOTE — PROGRESS NOTE ADULT - SUBJECTIVE AND OBJECTIVE BOX
Ms. Whipple/family called my cell last night at 22:45- notified me that they decided not to have surgery today. They wish to be DC'ed and reassess their options.  Will need wound care instructions and appropriate fu.

## 2018-12-14 NOTE — PROVIDER CONTACT NOTE (OTHER) - ACTION/TREATMENT ORDERED:
PA above notified and will order tylenol, no further intervention, blood culture is pending, will continue to monitor.
Continue heparin drip at 9 units/hour
Continue heparin drip at 9 units/hr
Will contact vascular surgery about update.  Will discontinue NPO at midnight orders
Will pause heparin drip.
oxygen saturation is fine, wean patient off oxygen, maintain higher oxygen saturation on RA and continue to monitor.

## 2018-12-14 NOTE — PROGRESS NOTE ADULT - ASSESSMENT
1. LLE Gangrene    -- pt asked to hold off on BKA  -- Low suspicion for APLS as her B2 glycoprotein Abs neg and anticardiolipin IgG and IgM neg  -- pt now on hep gtt  -- if d/c home will need Apixaban restarted  -- f/u vascular, ID, and rheum  -- short course steroids completed, as per rheum       2. anemia likely anemia of chronic inflammation +/- KRIS    --- H/H stable  --  Repeat Fe panel still with ferritin of 300s  -- check soluble transferrin receptor and alpha thal as outpt  -- No B12/Folate Def  -- No Hemolysis  -- No monoclonal gammopathy on SPEP or TANNER    3. Reactive thrombocytosis     -- Improved  -- No indication for cytoreduction    4. Side branch IPMN     -- GI following  -- ca19-9 not elevated  -- outpt follow up    Olga Gerardo MD  [phone]

## 2018-12-14 NOTE — PROVIDER CONTACT NOTE (OTHER) - BACKGROUND
Pt admitted for cellulitis of LLE
Patient adm for cellulitis of LE, hx of breast CA and DM
Patient admitted for cellulitis of left lower extremity.
Pt admitted for LLE wound
Pt admitted for cellulitis of LLE
Pt is on a heparin drip

## 2018-12-14 NOTE — PROGRESS NOTE ADULT - SUBJECTIVE AND OBJECTIVE BOX
Pt is seen and examined  pt is awake and lying in bed   She changed her mind after daughters convinced her not to do surgery, might seek 2nd opinion      PAST MEDICAL & SURGICAL HISTORY:  DM (diabetes mellitus)  HTN (hypertension)  Breast CA  No significant past surgical history      ROS:  Negative except for: none    MEDICATIONS  (STANDING):  cilostazol 100 milliGRAM(s) Oral two times a day  dextrose 5%. 1000 milliLiter(s) (50 mL/Hr) IV Continuous <Continuous>  dextrose 50% Injectable 12.5 Gram(s) IV Push once  gabapentin 200 milliGRAM(s) Oral three times a day  heparin  Infusion.  Unit(s)/Hr (12 mL/Hr) IV Continuous <Continuous>  insulin lispro (HumaLOG) corrective regimen sliding scale   SubCutaneous three times a day before meals  lidocaine   Patch 1 Patch Transdermal daily  lisinopril 10 milliGRAM(s) Oral daily  predniSONE   Tablet 20 milliGRAM(s) Oral daily  predniSONE   Tablet   Oral     MEDICATIONS  (PRN):  acetaminophen   Tablet .. 975 milliGRAM(s) Oral every 6 hours PRN Mild Pain (1 - 3)  acetaminophen   Tablet .. 650 milliGRAM(s) Oral every 6 hours PRN Temp greater or equal to 38C (100.4F)  dextrose 40% Gel 15 Gram(s) Oral once PRN Blood Glucose LESS THAN 70 milliGRAM(s)/deciliter  docusate sodium 100 milliGRAM(s) Oral three times a day PRN Constipation  glucagon  Injectable 1 milliGRAM(s) IntraMuscular once PRN Glucose LESS THAN 70 milligrams/deciliter  heparin  Injectable 5500 Unit(s) IV Push every 6 hours PRN For aPTT less than 40  heparin  Injectable 2500 Unit(s) IV Push every 6 hours PRN For aPTT between 40 - 57      Allergies    No Known Allergies    Intolerances        Vital Signs Last 24 Hrs  T(C): 36.9 (14 Dec 2018 05:26), Max: 36.9 (14 Dec 2018 05:26)  T(F): 98.4 (14 Dec 2018 05:26), Max: 98.4 (14 Dec 2018 05:26)  HR: 85 (14 Dec 2018 05:26) (85 - 99)  BP: 115/76 (14 Dec 2018 05:26) (104/60 - 122/76)  BP(mean): --  RR: 18 (14 Dec 2018 05:26) (18 - 18)  SpO2: 100% (14 Dec 2018 05:26) (96% - 100%)    PHYSICAL EXAM    NC/AT, No Icterus  Awake, alert  RRR, S1S2+  CTAB  Abd soft, NT, ND  No edema, L foot with dry gangrene about half way up the foot, then darkened/cyanotic skin up to the ankle      LABS:                          10.2   12.00 )-----------( 402      ( 14 Dec 2018 03:10 )             33.8     Serial CBC's  12-14 @ 03:10  Hct-33.8 / Hgb-10.2 / Plat-402 / RBC-4.13 / WBC-12.00          Serial CBC's  12-13 @ 07:35  Hct-33.5 / Hgb-10.3 / Plat-409 / RBC-4.13 / WBC-11.59            12-14    139  |  102  |  25<H>  ----------------------------<  102<H>  4.5   |  26  |  0.93    Ca    10.1      14 Dec 2018 03:10  Phos  2.7     12-14  Mg     2.2     12-14    TPro  6.2  /  Alb  3.1<L>  /  TBili  0.3  /  DBili  x   /  AST  7   /  ALT  6   /  AlkPhos  65  12-14      PT/INR - ( 14 Dec 2018 03:10 )   PT: 12.2 SEC;   INR: 1.10          PTT - ( 14 Dec 2018 03:10 )  PTT:93.2 SEC    WBC Count: 12.00 K/uL (12-14 @ 03:10)  Hemoglobin: 10.2 g/dL (12-14 @ 03:10)            RADIOLOGY & ADDITIONAL STUDIES:

## 2019-01-25 NOTE — PROGRESS NOTE ADULT - ASSESSMENT
2019        Nathaniel Behrendt   2004    The above patient has been under our medical care since he was about 5 years old.    He was seen today for a well-child exam. Apart from his mental health and neurological issues, he is healthy.    He is medically cleared to attend the residential facility as planned.      Sincerely,            Omar Soares MD     68F with DM, HTN, weight loss, admitted on 11/14/18 with left foot pain of several months duration. Evaluation did not reveal arterial insufficiency  cultures negative  Syphilis screen negative  Cefazolin discontinued 11/21 - no decompensation off antibiotics  Microvascular disease - possible eventual BKA  Appreciate Rheumatology formulation and differential diagnosis    signing off case  Please reconsult ID as needed.

## 2019-02-21 NOTE — ED ADULT NURSE NOTE - NS ED PATIENT SAFETY CONCERN
Modify Regimen: Ammonium lactate 12% lotion. Apply to arms twice daily as needed for flares. Discontinue Regimen: Ketoconazole 2% shampoo Detail Level: Zone Initiate Treatment: Betamethasone valerate 0.1% lotion, Apply to affected area on right arm once daily. No

## 2020-04-03 NOTE — BEHAVIORAL HEALTH ASSESSMENT NOTE - NSBHCONSULTDISCUSS_PSY_A_CORE
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          
yes

## 2022-01-19 NOTE — PROVIDER CONTACT NOTE (CRITICAL VALUE NOTIFICATION) - BACKGROUND
Pt admitted for necrotic foot [Time Spent: ___ minutes] : I have spent [unfilled] minutes of time on the encounter.

## 2022-09-14 NOTE — PATIENT PROFILE ADULT - NSPROPTRIGHTSUPPORTPERSON_GEN_A_NUR
- Patient presents with generalized abdominal pain with non-bloody diarrhea, nausea, vomiting, and fever   - Patient previously presented 9/6 for similar symptoms and was sent home with ciprofloxacin and metronidazole for likely colitis  - Patient reports that the pills did not help and her symptoms continued  Presentation could be related to colitis, potentially infectious although there may be also an element of ischemic colitis given her diffuse atherosclerosis    · Currently endorses lower abdominal pain that radiates up the sides of the abdomen, occasionally into the back  ·  CT abdomen/pelvis shows "Diffuse progressive colorectal thickening of entire colon   Diffuse prominent pericolonic vascularity of descending colon through rectum," consistent with proctocolitis  · Start Unasyn 1 5g q12hr at 100 mL/hr over 30 min  · Follow stool studies including C diff  · GI consult will be appreciated same name as above

## 2023-01-18 NOTE — CHART NOTE - NSCHARTNOTESELECT_GEN_ALL_CORE
Please follow up if you have any further issues.    You may contact me by phone or MyChart if you are worsening or if things are not improving.    ______________________________________________________________________     You can call [phone] during weekday hours to get a hold of our team coordinators if you need to get a message to me.    There are 3 people in our building taking phone calls for me.  Be sure to listen to the prompts to get a hold of them.    You first press 1 for English (press another number for a different language) and then press 2 to get the .    They can then take your message and forward it onto me.    ______________________________________________________________________     Please remember that you can call [phone] ANYTIME to schedule an appointment.     You can schedule appointments 24 hours a day, 7 days a week.      Sometimes the best time to schedule an appointment is after clinic hours when less people are calling in.      Weekends are another option for calling in to schedule appointments.     
ADS/Event Note
ADS/Event Note
Consultant Follow-up/Event Note
Event Note
Event Note/ADS
Event Note/heme consult
Event Note/rheum consult
Follow Up/Nutrition Services
Nutrition Services/MALNUTRITION ALERT
Sign Off/Event Note
ads
derm consult called/Event Note
emailed rheum about follow up/Event Note
psychiatry consultation note/Event Note

## 2023-05-12 NOTE — ED ADULT NURSE NOTE - NSSISCREENINGQ4_ED_A_ED
Encounter Date: 5/12/2023       History     Chief Complaint   Patient presents with    Sore Throat    Generalized Body Aches     Pt states she started feeling bad 2 days ago. Pt has been running fever since yesterday. Pt c/o pain in her throat and generalized pain. Pt has raspy voice.      Patient presents with a 2 day history of sore throat and body aches.  She reports no cough or shortness of breath.  She reports nausea or vomiting.  She reports no drooling or changes in her speech.  She has a negative past medical history      Review of patient's allergies indicates:  No Known Allergies  History reviewed. No pertinent past medical history.  History reviewed. No pertinent surgical history.  History reviewed. No pertinent family history.     Review of Systems   Constitutional:         Body aches/malaise   HENT:  Positive for sore throat.    Eyes: Negative.    Respiratory: Negative.     Cardiovascular: Negative.      Physical Exam     Initial Vitals [05/12/23 0922]   BP Pulse Resp Temp SpO2   (!) 155/92 97 18 100 °F (37.8 °C) --      MAP       --         Physical Exam    Constitutional: She appears well-developed and well-nourished.   HENT:   Head: Normocephalic and atraumatic.   Mouth/Throat: Oropharyngeal exudate present.   Eyes: EOM are normal. Pupils are equal, round, and reactive to light.   Neck: Neck supple.   Normal range of motion.  Cardiovascular:  Normal rate, regular rhythm and normal heart sounds.           Pulmonary/Chest: Breath sounds normal.   Musculoskeletal:         General: Normal range of motion.      Cervical back: Normal range of motion and neck supple.         ED Course   Procedures  Labs Reviewed   THROAT SCREEN, RAPID STREP - Abnormal; Notable for the following components:       Result Value    Rapid Group A Strep Positive (*)     All other components within normal limits   RAPID INFLUENZA A/B   SARS-COV-2 RNA AMPLIFICATION, QUAL          Imaging Results    None          Medications    ibuprofen tablet 800 mg (has no administration in time range)                              Clinical Impression:   Final diagnoses:  [J02.0] Strep pharyngitis (Primary)        ED Disposition Condition    Discharge Stable          ED Prescriptions       Medication Sig Dispense Start Date End Date Auth. Provider    amoxicillin (AMOXIL) 875 MG tablet Take 1 tablet (875 mg total) by mouth every 12 (twelve) hours. 20 tablet 5/12/2023 -- Timothy Ennis MD          Follow-up Information    None          Timothy Ennis MD  05/12/23 1015     No

## 2023-08-29 NOTE — PROGRESS NOTE ADULT - ASSESSMENT
67 yo F w pmhx of T2DM, HTN presenting with 1 month of LLE swelling and discoloration admitted for  presumed cellulitis       seen by id and abs stopped  will have reval today      ·  Problem: HTN (hypertension).  Recommendation: BP well controlled  c/w home med of lisinopril.       ·  Problem: DM (diabetes mellitus).  Recommendation: Blood glucose well controlled  C/w sliding scale insulin.       ·  Problem: Microcytic anemia.  Recommendation: likely KRIS, outpt f/u    ·  Problem: Ischemia of LLE - c/w Heparin gtt for now, await results of vasculitis/APLS w/u  Rheum and Heme appreciated, await vasculitis w/u results  Derm appreciated, no role of repeat skin bx at this time  MRA LLE  done f/u results noted    will have second opinion rheum/ heme evals     Adrenal incidenteloma - await MRI, surg appreciated      mld hyponatremia  change iv fluids  hypokalemia  supple k+    discussed case w/ daughter at length  yesterday  discussed issues at hand and that work up was unrevieling for any surgical intervention which i concurred w/ vascular   rheum / heme to f/u for any additional recs at this point   seems as thou amputation of toes or foot maybe likely  discussed w/ daughter yesterday    base Pre-application: Motor, sensory, and vascular responses intact in the injured extremity./Post-application: Motor, sensory, and vascular responses intact in the injured extremity./The patient/caregiver verbalized understanding of how to care for the injured extremity with splint

## 2023-12-28 NOTE — PROGRESS NOTE ADULT - PROVIDER SPECIALTY LIST ADULT
Hospice referral received.     Awaiting for pt's son to come to [facility].     SW will continue to follow.  ESTER Mayfield  [phone]   ------------------------------  Addendum 1700    SW following for hospice consult.    Received hospice.    Met with pt's PINKY Adler and family at b/s. Introductions were made and SW role explained. Pt's family  is aware of pt's poor prognosis and is agreeable for hospice care. Discussed hospice philosophy, hospice agencies, insurance issues, different housing arrangements, and offered a list of hospice providers. Pt's family prefers referral to [facility] and is hopeful for GIP.    Referral sent to [facility] via [facility].    Plan:  Awaiting for [facility] meeting time.  SW to continue to follow for hospice care planning.    ESTER Mayfield  [phone]    Internal Medicine

## 2024-09-10 NOTE — PATIENT PROFILE ADULT - FALL HARM RISK CONCLUSION
Ordered placed.  I do not know availability of a urology video visit.   
Please advise     See dx for pending referral?   
Fall with Harm Risk

## 2025-01-24 NOTE — ED ADULT NURSE NOTE - CHIEF COMPLAINT QUOTE
Detail Level: Zone
Pt sent in by PMD to r/o Lt lower leg ischemia, pt states she has poor circulation to Lt leg c/o redness, warmth, and swelling to Lt leg, denies sob, breathing even and unlabored, denies cp/discomfort. Pt sent in for admission.

## 2025-07-15 NOTE — ED ADULT NURSE NOTE - NSIMPLEMENTINTERV_GEN_ALL_ED
Called patient regarding referral to our [facility]. Patient did not answer so I left a voicemail with our contact information.      Implemented All Universal Safety Interventions:  [place] to call system. Call bell, personal items and telephone within reach. Instruct patient to call for assistance. Room bathroom lighting operational. Non-slip footwear when patient is off stretcher. Physically safe environment: no spills, clutter or unnecessary equipment. Stretcher in lowest position, wheels locked, appropriate side rails in place.